# Patient Record
Sex: MALE | Race: WHITE | NOT HISPANIC OR LATINO | Employment: OTHER | URBAN - NONMETROPOLITAN AREA
[De-identification: names, ages, dates, MRNs, and addresses within clinical notes are randomized per-mention and may not be internally consistent; named-entity substitution may affect disease eponyms.]

---

## 2017-03-07 ENCOUNTER — TRANSCRIBE ORDERS (OUTPATIENT)
Dept: ULTRASOUND IMAGING | Facility: HOSPITAL | Age: 72
End: 2017-03-07

## 2017-03-07 DIAGNOSIS — I10 UNSPECIFIED ESSENTIAL HYPERTENSION: Primary | ICD-10-CM

## 2017-03-13 ENCOUNTER — HOSPITAL ENCOUNTER (OUTPATIENT)
Dept: ULTRASOUND IMAGING | Facility: HOSPITAL | Age: 72
Discharge: HOME | End: 2017-03-13
Admitting: INTERNAL MEDICINE

## 2017-03-13 DIAGNOSIS — I10 UNSPECIFIED ESSENTIAL HYPERTENSION: ICD-10-CM

## 2017-03-13 PROCEDURE — 76775 US EXAM ABDO BACK WALL LIM: CPT

## 2017-09-15 ENCOUNTER — OFFICE VISIT (OUTPATIENT)
Dept: UROLOGY | Facility: CLINIC | Age: 72
End: 2017-09-15

## 2017-09-15 VITALS
BODY MASS INDEX: 36.07 KG/M2 | SYSTOLIC BLOOD PRESSURE: 122 MMHG | HEIGHT: 68 IN | DIASTOLIC BLOOD PRESSURE: 80 MMHG | TEMPERATURE: 97.5 F | OXYGEN SATURATION: 97 % | WEIGHT: 238 LBS | HEART RATE: 68 BPM

## 2017-09-15 DIAGNOSIS — N13.8 BPH (BENIGN PROSTATIC HYPERTROPHY) WITH URINARY OBSTRUCTION: ICD-10-CM

## 2017-09-15 DIAGNOSIS — R97.20 ELEVATED PROSTATE SPECIFIC ANTIGEN (PSA): Primary | ICD-10-CM

## 2017-09-15 DIAGNOSIS — N40.1 BPH (BENIGN PROSTATIC HYPERTROPHY) WITH URINARY OBSTRUCTION: ICD-10-CM

## 2017-09-15 LAB
BILIRUB BLD-MCNC: NEGATIVE MG/DL
CLARITY, POC: CLEAR
COLOR UR: YELLOW
GLUCOSE UR STRIP-MCNC: NEGATIVE MG/DL
KETONES UR QL: NEGATIVE
LEUKOCYTE EST, POC: NEGATIVE
NITRITE UR-MCNC: NEGATIVE MG/ML
PH UR: 6 [PH] (ref 5–8)
PROT UR STRIP-MCNC: NEGATIVE MG/DL
PSA SERPL-MCNC: 1.77 NG/ML (ref 0.06–4)
RBC # UR STRIP: NEGATIVE /UL
SP GR UR: 1.01 (ref 1–1.03)
UROBILINOGEN UR QL: NORMAL

## 2017-09-15 PROCEDURE — 99213 OFFICE O/P EST LOW 20 MIN: CPT | Performed by: UROLOGY

## 2017-09-15 PROCEDURE — 81003 URINALYSIS AUTO W/O SCOPE: CPT | Performed by: UROLOGY

## 2017-09-15 RX ORDER — AMLODIPINE BESYLATE 5 MG/1
5 TABLET ORAL DAILY
COMMUNITY
End: 2019-09-23 | Stop reason: SDUPTHER

## 2017-09-15 RX ORDER — FINASTERIDE 5 MG/1
5 TABLET, FILM COATED ORAL DAILY
Qty: 90 TABLET | Refills: 3 | Status: SHIPPED | OUTPATIENT
Start: 2017-09-15 | End: 2019-07-17 | Stop reason: SDUPTHER

## 2017-09-15 RX ORDER — DOXAZOSIN 2 MG/1
2 TABLET ORAL NIGHTLY
COMMUNITY
End: 2019-07-17 | Stop reason: SDUPTHER

## 2018-09-17 ENCOUNTER — OFFICE VISIT (OUTPATIENT)
Dept: UROLOGY | Facility: CLINIC | Age: 73
End: 2018-09-17

## 2018-09-17 VITALS
HEIGHT: 68 IN | SYSTOLIC BLOOD PRESSURE: 126 MMHG | BODY MASS INDEX: 36.07 KG/M2 | HEART RATE: 64 BPM | OXYGEN SATURATION: 96 % | TEMPERATURE: 98.6 F | WEIGHT: 238 LBS | DIASTOLIC BLOOD PRESSURE: 82 MMHG

## 2018-09-17 DIAGNOSIS — Z87.438 HISTORY OF BPH: Primary | ICD-10-CM

## 2018-09-17 DIAGNOSIS — R97.20 ELEVATED PROSTATE SPECIFIC ANTIGEN (PSA): ICD-10-CM

## 2018-09-17 LAB
BILIRUB BLD-MCNC: NEGATIVE MG/DL
CLARITY, POC: CLEAR
COLOR UR: YELLOW
GLUCOSE UR STRIP-MCNC: NEGATIVE MG/DL
KETONES UR QL: NEGATIVE
LEUKOCYTE EST, POC: NEGATIVE
NITRITE UR-MCNC: NEGATIVE MG/ML
PH UR: 6 [PH] (ref 5–8)
PROT UR STRIP-MCNC: NEGATIVE MG/DL
RBC # UR STRIP: NEGATIVE /UL
SP GR UR: 1.02 (ref 1–1.03)
UROBILINOGEN UR QL: NORMAL

## 2018-09-17 PROCEDURE — 99213 OFFICE O/P EST LOW 20 MIN: CPT | Performed by: UROLOGY

## 2018-09-17 PROCEDURE — 81003 URINALYSIS AUTO W/O SCOPE: CPT | Performed by: UROLOGY

## 2018-09-17 NOTE — PROGRESS NOTES
Chief Complaint  Benign Prostatic Hypertrophy (Patient is here for a yearly follow up.)        NATHALY Leyva is a 73 y.o. male who returns today for annual checkup generally doing well.  He's known have an enlarged prostate and originally had an elevated PSA.  Since taking finasteride to shrink the enlarged gland his PSA has been back down to normal and was most recently measured by Dr. Mejias at 1.7.  He also takes Cardura for hypertension and this could assist his voiding.    Vitals:    09/17/18 1014   BP: 126/82   Pulse: 64   Temp: 98.6 °F (37 °C)   SpO2: 96%       Past Medical History  Past Medical History:   Diagnosis Date   • BPH (benign prostatic hyperplasia)    • ED (erectile dysfunction)    • Elevated PSA    • Hypogonadism in male        Past Surgical History  Past Surgical History:   Procedure Laterality Date   • EYE SURGERY         Medications  has a current medication list which includes the following prescription(s): allopurinol, amlodipine, doxazosin, duloxetine, finasteride, metoprolol succinate xl, and metoprolol tartrate.      Allergies  Allergies   Allergen Reactions   • Sulfa Antibiotics Unknown (See Comments)       Social History  Social History     Social History Narrative   • No narrative on file       Family History  He has no family history of bladder or kidney cancer  He has no family history of kidney stones      AUA Symptom Score:      Review of Systems  Review of Systems    Physical Exam  Physical Exam   Constitutional: He is oriented to person, place, and time. He appears well-developed and well-nourished.   HENT:   Head: Normocephalic and atraumatic.   Neck: Normal range of motion.   Pulmonary/Chest: Effort normal. No respiratory distress.   Abdominal: Soft. He exhibits no distension and no mass. There is no tenderness. No hernia.   Genitourinary: Rectum normal and prostate normal.   Musculoskeletal: Normal range of motion.   Lymphadenopathy:     He has no cervical adenopathy.    Neurological: He is alert and oriented to person, place, and time.   Skin: Skin is warm and dry.   Psychiatric: He has a normal mood and affect. His behavior is normal.   Vitals reviewed.      Labs Recent and today in the office:  Results for orders placed or performed in visit on 09/17/18   POC Urinalysis Dipstick, Automated   Result Value Ref Range    Color Yellow Yellow, Straw, Dark Yellow, María    Clarity, UA Clear Clear    Specific Gravity  1.020 1.005 - 1.030    pH, Urine 6.0 5.0 - 8.0    Leukocytes Negative Negative    Nitrite, UA Negative Negative    Protein, POC Negative Negative mg/dL    Glucose, UA Negative Negative, 1000 mg/dL (3+) mg/dL    Ketones, UA Negative Negative    Urobilinogen, UA Normal Normal    Bilirubin Negative Negative    Blood, UA Negative Negative         Assessment & Plan  BPH with outlet obstruction/elevated PSA: Digital rectal exam is benign and PSA is down to normal on Proscar.  He'll continue this medication and return on an annual basis.  He is encouraged to eat a heart healthy diet and reduce his weight to minimize the risk of prostate cancer.  Some dietary counseling is provided.

## 2019-07-17 ENCOUNTER — OFFICE VISIT (OUTPATIENT)
Dept: INTERNAL MEDICINE | Facility: CLINIC | Age: 74
End: 2019-07-17

## 2019-07-17 VITALS
DIASTOLIC BLOOD PRESSURE: 84 MMHG | HEART RATE: 66 BPM | SYSTOLIC BLOOD PRESSURE: 170 MMHG | TEMPERATURE: 97.3 F | BODY MASS INDEX: 36.34 KG/M2 | HEIGHT: 68 IN | WEIGHT: 239.8 LBS | OXYGEN SATURATION: 96 %

## 2019-07-17 DIAGNOSIS — E03.9 HYPOTHYROIDISM, UNSPECIFIED TYPE: ICD-10-CM

## 2019-07-17 DIAGNOSIS — I10 ESSENTIAL HYPERTENSION: ICD-10-CM

## 2019-07-17 DIAGNOSIS — M54.16 LUMBAR RADICULOPATHY: ICD-10-CM

## 2019-07-17 DIAGNOSIS — E11.9 TYPE 2 DIABETES MELLITUS WITHOUT COMPLICATION, WITHOUT LONG-TERM CURRENT USE OF INSULIN (HCC): ICD-10-CM

## 2019-07-17 DIAGNOSIS — E55.9 VITAMIN D DEFICIENCY: ICD-10-CM

## 2019-07-17 DIAGNOSIS — M10.00 IDIOPATHIC GOUT, UNSPECIFIED CHRONICITY, UNSPECIFIED SITE: ICD-10-CM

## 2019-07-17 DIAGNOSIS — M51.36 DEGENERATION OF LUMBAR INTERVERTEBRAL DISC: ICD-10-CM

## 2019-07-17 DIAGNOSIS — E78.5 HYPERLIPIDEMIA, UNSPECIFIED HYPERLIPIDEMIA TYPE: Primary | ICD-10-CM

## 2019-07-17 DIAGNOSIS — R25.2 LEG CRAMP: ICD-10-CM

## 2019-07-17 DIAGNOSIS — N40.0 BENIGN NON-NODULAR PROSTATIC HYPERPLASIA: ICD-10-CM

## 2019-07-17 PROCEDURE — 99204 OFFICE O/P NEW MOD 45 MIN: CPT | Performed by: INTERNAL MEDICINE

## 2019-07-17 RX ORDER — METOPROLOL SUCCINATE 100 MG/1
100 TABLET, EXTENDED RELEASE ORAL DAILY
Qty: 90 TABLET | Refills: 3 | Status: SHIPPED | OUTPATIENT
Start: 2019-07-17 | End: 2020-08-03

## 2019-07-17 RX ORDER — LISINOPRIL AND HYDROCHLOROTHIAZIDE 12.5; 1 MG/1; MG/1
1 TABLET ORAL DAILY
Qty: 30 TABLET | Refills: 1 | Status: SHIPPED | OUTPATIENT
Start: 2019-07-17 | End: 2019-09-11 | Stop reason: SDUPTHER

## 2019-07-17 RX ORDER — FINASTERIDE 5 MG/1
5 TABLET, FILM COATED ORAL DAILY
COMMUNITY
End: 2019-09-23 | Stop reason: SDUPTHER

## 2019-07-17 RX ORDER — DOXAZOSIN 2 MG/1
2 TABLET ORAL NIGHTLY
Qty: 90 TABLET | Refills: 3 | Status: SHIPPED | OUTPATIENT
Start: 2019-07-17 | End: 2020-08-11

## 2019-07-17 RX ORDER — DULOXETIN HYDROCHLORIDE 30 MG/1
30 CAPSULE, DELAYED RELEASE ORAL DAILY
Qty: 90 CAPSULE | Refills: 3 | Status: SHIPPED | OUTPATIENT
Start: 2019-07-17 | End: 2020-08-03

## 2019-07-17 NOTE — PROGRESS NOTES
Subjective   John Leyva is a 74 y.o. male.     Chief Complaint   Patient presents with   • Establish Care   • Leg Pain     right sided leg and hip pain, and numbness. Has to use a cane to walk on unlevel ground       History of Present Illness   Patient here to establish care.  Hyperlipidemia on diet need a blood tests.  Blood pressure elevated today on medications.  Hypothyroidism in the past need a blood tests.  Patient also complains right buttock area pain radiated to right leg weakness in the right leg and loss of balance foot drop.  History of abnormal MRI patient was recommended to have surgery but patient declined.  BPH is stable on medication needs medicine refill.  Gout is stable.  Also complains some leg cramps.  Diabetes on the problem list patient denies.    Current Outpatient Medications:   •  allopurinol (ZYLOPRIM) 100 MG tablet, , Disp: , Rfl:   •  amLODIPine (NORVASC) 5 MG tablet, Take 5 mg by mouth Daily., Disp: , Rfl:   •  doxazosin (CARDURA) 2 MG tablet, Take 2 mg by mouth Every Night., Disp: , Rfl:   •  DULoxetine (CYMBALTA) 30 MG capsule, , Disp: , Rfl:   •  finasteride (PROSCAR) 5 MG tablet, Take 5 mg by mouth Daily., Disp: , Rfl:   •  metoprolol succinate XL (TOPROL-XL) 100 MG 24 hr tablet, , Disp: , Rfl:     The following portions of the patient's history were reviewed and updated as appropriate: allergies, current medications, past family history, past medical history, past social history, past surgical history and problem list.    Review of Systems   Constitutional: Negative.    Respiratory: Negative.    Cardiovascular: Negative.    Gastrointestinal: Negative.    Musculoskeletal:        Radiculopathy   Skin: Negative.    Neurological: Positive for numbness.   Psychiatric/Behavioral: Negative.        Objective   Physical Exam   Constitutional: He is oriented to person, place, and time. He appears well-developed and well-nourished.   Neck: Neck supple.   Cardiovascular: Normal rate,  regular rhythm and normal heart sounds.   Pulmonary/Chest: Effort normal and breath sounds normal.   Abdominal: Bowel sounds are normal.   Neurological: He is alert and oriented to person, place, and time.   Skin: Skin is warm.   Psychiatric: He has a normal mood and affect.       All tests have been reviewed.    Assessment/Plan   Diagnoses and all orders for this visit:    Hyperlipidemia, unspecified hyperlipidemia type   -     Comprehensive Metabolic Panel  -     Lipid Panel    Essential hypertensionadd prinzide  -     CBC & Differential  -     Urinalysis With Microscopic If Indicated (No Culture) - Urine, Clean Catch    Vitamin D deficiency    Hypothyroidism, unspecified type   -     TSH    Degeneration of lumbar intervertebral disc     Benign non-nodular prostatic hyperplasia continue med refill  -     PSA DIAGNOSTIC    Idiopathic gout, unspecified chronicity, unspecified site  -     Uric Acid    Leg cramp stretch    Type 2 diabetes mellitus without complication, without long-term current use of insulin (CMS/[facility]), patient denies DM, but in hx  -     Hemoglobin A1c  -     MicroAlbumin, Urine, Random - Urine, Clean Catch    Lumbar radiculopathy loss of disc space in L4 and L5, need report, s/p epidural by pain clinic, candidate for surgery patient refused right foot drop now,     Other orders  -     finasteride (PROSCAR) 5 MG tablet; Take 5 mg by mouth Daily.    colon 3years ago  Need record  Patient states no DM, will check  3 week after labs

## 2019-08-21 ENCOUNTER — OFFICE VISIT (OUTPATIENT)
Dept: INTERNAL MEDICINE | Facility: CLINIC | Age: 74
End: 2019-08-21

## 2019-08-21 VITALS
HEART RATE: 61 BPM | WEIGHT: 238.4 LBS | HEIGHT: 68 IN | DIASTOLIC BLOOD PRESSURE: 80 MMHG | BODY MASS INDEX: 36.13 KG/M2 | TEMPERATURE: 96.7 F | OXYGEN SATURATION: 95 % | SYSTOLIC BLOOD PRESSURE: 134 MMHG

## 2019-08-21 DIAGNOSIS — E11.9 TYPE 2 DIABETES MELLITUS WITHOUT COMPLICATION, WITHOUT LONG-TERM CURRENT USE OF INSULIN (HCC): ICD-10-CM

## 2019-08-21 DIAGNOSIS — M10.00 IDIOPATHIC GOUT, UNSPECIFIED CHRONICITY, UNSPECIFIED SITE: ICD-10-CM

## 2019-08-21 DIAGNOSIS — M51.36 DEGENERATION OF LUMBAR INTERVERTEBRAL DISC: ICD-10-CM

## 2019-08-21 DIAGNOSIS — E78.5 HYPERLIPIDEMIA, UNSPECIFIED HYPERLIPIDEMIA TYPE: Primary | ICD-10-CM

## 2019-08-21 DIAGNOSIS — R53.83 OTHER FATIGUE: ICD-10-CM

## 2019-08-21 DIAGNOSIS — I10 ESSENTIAL HYPERTENSION: ICD-10-CM

## 2019-08-21 DIAGNOSIS — E55.9 VITAMIN D DEFICIENCY: ICD-10-CM

## 2019-08-21 DIAGNOSIS — N40.0 BENIGN NON-NODULAR PROSTATIC HYPERPLASIA: ICD-10-CM

## 2019-08-21 DIAGNOSIS — E03.9 HYPOTHYROIDISM, UNSPECIFIED TYPE: ICD-10-CM

## 2019-08-21 PROCEDURE — 96160 PT-FOCUSED HLTH RISK ASSMT: CPT | Performed by: INTERNAL MEDICINE

## 2019-08-21 PROCEDURE — G0439 PPPS, SUBSEQ VISIT: HCPCS | Performed by: INTERNAL MEDICINE

## 2019-08-21 PROCEDURE — 99213 OFFICE O/P EST LOW 20 MIN: CPT | Performed by: INTERNAL MEDICINE

## 2019-08-21 PROCEDURE — 99397 PER PM REEVAL EST PAT 65+ YR: CPT | Performed by: INTERNAL MEDICINE

## 2019-08-21 RX ORDER — ATORVASTATIN CALCIUM 20 MG/1
20 TABLET, FILM COATED ORAL DAILY
Qty: 30 TABLET | Refills: 1 | Status: SHIPPED | OUTPATIENT
Start: 2019-08-21 | End: 2019-10-02 | Stop reason: SDUPTHER

## 2019-08-21 RX ORDER — MELOXICAM 7.5 MG/1
7.5 TABLET ORAL 2 TIMES DAILY PRN
Qty: 60 TABLET | Refills: 1 | Status: SHIPPED | OUTPATIENT
Start: 2019-08-21 | End: 2019-10-02 | Stop reason: SDUPTHER

## 2019-08-21 NOTE — PROGRESS NOTES
Subsequent Medicare Wellness Visit    Chief Complaint   Patient presents with   • Medicare Wellness-subsequent       Subjective   History of Present Illness:  John Leyva is a 74 y.o. male who presents for a Subsequent Medicare Wellness Visit.    Patient here for wellness check also has some medical problems to be addressed.  Patient complains of lower back problems patient does have a history of a radiculopathy patient still has tingling numbness pain in the right buttock and right thigh all the time.  Weightbearing make it worse.  History of MRI possible surgical candidate patient declined.  Blood pressure normalized after medication.  Patient is not on cholesterol medication.  Patient yet to do blood tests for diabetes.  Patient also has significant BPH nighttime urine 2-4 times a night    HEALTH RISK ASSESSMENT    Recent Hospitalizations:  No hospitalization(s) within the last year.    Current Medical Providers:  Patient Care Team:  Mahesh Almanza MD as PCP - General (Internal Medicine)    Smoking Status:  Social History     Tobacco Use   Smoking Status Never Smoker   Smokeless Tobacco Never Used       Alcohol Consumption:  Social History     Substance and Sexual Activity   Alcohol Use No       Depression Screen:   PHQ-2/PHQ-9 Depression Screening 8/21/2019   Little interest or pleasure in doing things 0   Feeling down, depressed, or hopeless 0   Total Score 0       Fall Risk Screen:  STEADI Fall Risk Assessment was completed, and patient is at HIGH risk for falls. Assessment completed on:7/17/2019    Health Habits and Functional and Cognitive Screening:  Functional & Cognitive Status 8/21/2019   Do you have difficulty preparing food and eating? No   Do you have difficulty bathing yourself, getting dressed or grooming yourself? No   Do you have difficulty using the toilet? No   Do you have difficulty moving around from place to place? No   Do you have trouble with steps or getting out of a bed or a chair? No    Current Diet Well Balanced Diet   Dental Exam Not up to date   Eye Exam Up to date   Exercise (times per week) 7 times per week   Current Exercise Activities Include Yard Work   Do you need help using the phone?  No   Are you deaf or do you have serious difficulty hearing?  No   Do you need help with transportation? No   Do you need help shopping? No   Do you need help preparing meals?  No   Do you need help with housework?  No   Do you need help with laundry? No   Do you need help taking your medications? No   Do you need help managing money? No   Do you ever drive or ride in a car without wearing a seat belt? No   Have you felt unusual stress, anger or loneliness in the last month? No   Who do you live with? Spouse   If you need help, do you have trouble finding someone available to you? No   Have you been bothered in the last four weeks by sexual problems? No   Do you have difficulty concentrating, remembering or making decisions? No         Does the patient have evidence of cognitive impairment? No    Asprin use counseling:Does not need ASA (and currently is not on it)    Age-appropriate Screening Schedule:  Refer to the list below for future screening recommendations based on patient's age, sex and/or medical conditions. Orders for these recommended tests are listed in the plan section. The patient has been provided with a written plan.    Health Maintenance   Topic Date Due   • URINE MICROALBUMIN  1945   • ZOSTER VACCINE (1 of 2) 07/07/1997   • DIABETIC FOOT EXAM  09/15/2017   • HEMOGLOBIN A1C  09/15/2017   • DIABETIC EYE EXAM  09/15/2017   • COLONOSCOPY  09/15/2017   • LIPID PANEL  07/17/2019   • INFLUENZA VACCINE  08/01/2019   • TDAP/TD VACCINES (2 - Td) 05/25/2027   • PNEUMOCOCCAL VACCINES (65+ LOW/MEDIUM RISK)  Completed          The following portions of the patient's history were reviewed and updated as appropriate: allergies, current medications, past family history, past medical history, past  social history, past surgical history and problem list.    Outpatient Medications Prior to Visit   Medication Sig Dispense Refill   • allopurinol (ZYLOPRIM) 100 MG tablet      • amLODIPine (NORVASC) 5 MG tablet Take 5 mg by mouth Daily.     • doxazosin (CARDURA) 2 MG tablet Take 1 tablet by mouth Every Night. 90 tablet 3   • DULoxetine (CYMBALTA) 30 MG capsule Take 1 capsule by mouth Daily. 90 capsule 3   • finasteride (PROSCAR) 5 MG tablet Take 5 mg by mouth Daily.     • lisinopril-hydrochlorothiazide (ZESTORETIC) 10-12.5 MG per tablet Take 1 tablet by mouth Daily. 30 tablet 1   • metoprolol succinate XL (TOPROL-XL) 100 MG 24 hr tablet Take 1 tablet by mouth Daily. 90 tablet 3     No facility-administered medications prior to visit.        Patient Active Problem List   Diagnosis   • Benign non-nodular prostatic hyperplasia   • Vitamin D deficiency   • Type 2 diabetes mellitus without complication (CMS/[facility])   • Primary gout   • Hypothyroidism   • Hyperlipidemia   • Essential hypertension   • Degeneration of lumbar intervertebral disc       Advanced Care Planning:  Patient has an advance directive - a copy has not been provided. Have asked the patient to send this to us to add to record    Review of Systems   Constitutional: Negative.    HENT: Negative.    Eyes: Negative.    Respiratory: Negative.    Cardiovascular: Negative.    Gastrointestinal: Negative.    Endocrine: Negative.    Genitourinary: Positive for difficulty urinating.   Musculoskeletal: Negative.    Skin: Negative.    Allergic/Immunologic: Negative.    Neurological: Negative.    Hematological: Negative.    Psychiatric/Behavioral: Negative.    All other systems reviewed and are negative.      Compared to one year ago, the patient feels his physical health is the same.  Compared to one year ago, the patient feels his mental health is the same.    Reviewed chart for potential of high risk medication in the elderly: yes  Reviewed chart for potential of  "harmful drug interactions in the elderly:no    Objective         Vitals:    08/21/19 1017   BP: 134/80   Pulse: 61   Temp: 96.7 °F (35.9 °C)   TempSrc: Temporal   SpO2: 95%   Weight: 108 kg (238 lb 6.4 oz)   Height: 172.7 cm (68\")   PainSc: 0-No pain       Body mass index is 36.25 kg/m².  Discussed the patient's BMI with him. The BMI is above average; BMI management plan is completed.    Physical Exam   Constitutional: He is oriented to person, place, and time. He appears well-developed and well-nourished.   HENT:   Head: Normocephalic and atraumatic.   Right Ear: External ear normal.   Left Ear: External ear normal.   Nose: Nose normal.   Mouth/Throat: Oropharynx is clear and moist.   Eyes: Conjunctivae and EOM are normal. Pupils are equal, round, and reactive to light.   Neck: Normal range of motion. Neck supple. No thyromegaly present.   Cardiovascular: Normal rate, regular rhythm, normal heart sounds and intact distal pulses.   Pulmonary/Chest: Effort normal and breath sounds normal.   Abdominal: Soft. Bowel sounds are normal.   Genitourinary: Rectum normal and penis normal.   Genitourinary Comments: BPH 4+   Musculoskeletal: Normal range of motion.   Neurological: He is alert and oriented to person, place, and time. He has normal reflexes.   Skin: Skin is warm and dry.   Psychiatric: He has a normal mood and affect. His behavior is normal. Judgment and thought content normal.   Nursing note and vitals reviewed.            Assessment/Plan   Medicare Risks and Personalized Health Plan  CMS Preventative Services Quick Reference  Obesity/Overweight     The above risks/problems have been discussed with the patient.  Pertinent information has been shared with the patient in the After Visit Summary.  Follow up plans and orders are seen below in the Assessment/Plan Section.    Diagnoses and all orders for this visit:    1. Hyperlipidemia, unspecified hyperlipidemia type (Primary)  Start lipitor 20    2. Essential " hypertension cont med    3. Vitamin D deficiency do lab    4. Type 2 diabetes mellitus without complication, without long-term current use of insulin (CMS/[facility]) do lab start lipitor    5. Hypothyroidism, unspecified type do lab    6. Degeneration of lumbar intervertebral disc cont med radiculpathy pain in right gluteal and thigh. MRI , start mobic.  Need old records      7. Benign non-nodular prostatic hyperplasia 4+ cont med patient is seeing urologist yearly    8. Idiopathic gout, unspecified chronicity, unspecified site do lab    colonoscopy 3 years ago need to report  Follow Up:  No Follow-up on file.     An After Visit Summary and PPPS were given to the patient.     6 weeks after blood tests

## 2019-09-04 ENCOUNTER — HOSPITAL ENCOUNTER (OUTPATIENT)
Dept: MRI IMAGING | Facility: HOSPITAL | Age: 74
End: 2019-09-04

## 2019-09-11 ENCOUNTER — HOSPITAL ENCOUNTER (OUTPATIENT)
Dept: MRI IMAGING | Facility: HOSPITAL | Age: 74
Discharge: HOME | End: 2019-09-11
Admitting: INTERNAL MEDICINE

## 2019-09-11 PROCEDURE — 72148 MRI LUMBAR SPINE W/O DYE: CPT

## 2019-09-12 RX ORDER — LISINOPRIL AND HYDROCHLOROTHIAZIDE 12.5; 1 MG/1; MG/1
TABLET ORAL
Qty: 30 TABLET | Refills: 0 | Status: SHIPPED | OUTPATIENT
Start: 2019-09-12 | End: 2019-10-02 | Stop reason: SDUPTHER

## 2019-09-23 ENCOUNTER — OFFICE VISIT (OUTPATIENT)
Dept: UROLOGY | Facility: CLINIC | Age: 74
End: 2019-09-23

## 2019-09-23 VITALS
SYSTOLIC BLOOD PRESSURE: 128 MMHG | HEART RATE: 63 BPM | BODY MASS INDEX: 36.07 KG/M2 | WEIGHT: 238 LBS | HEIGHT: 68 IN | DIASTOLIC BLOOD PRESSURE: 82 MMHG | OXYGEN SATURATION: 98 %

## 2019-09-23 DIAGNOSIS — N40.0 BENIGN NON-NODULAR PROSTATIC HYPERPLASIA: ICD-10-CM

## 2019-09-23 DIAGNOSIS — R97.20 ELEVATED PROSTATE SPECIFIC ANTIGEN (PSA): Primary | ICD-10-CM

## 2019-09-23 LAB
25(OH)D3+25(OH)D2 SERPL-MCNC: 34.5 NG/ML (ref 30–100)
BILIRUB BLD-MCNC: NEGATIVE MG/DL
CLARITY, POC: CLEAR
COLOR UR: YELLOW
GLUCOSE UR STRIP-MCNC: NEGATIVE MG/DL
KETONES UR QL: NEGATIVE
LEUKOCYTE EST, POC: NEGATIVE
NITRITE UR-MCNC: NEGATIVE MG/ML
PH UR: 7.5 [PH] (ref 5–8)
PROT UR STRIP-MCNC: NEGATIVE MG/DL
RBC # UR STRIP: NEGATIVE /UL
SP GR UR: 1.01 (ref 1–1.03)
UROBILINOGEN UR QL: NORMAL

## 2019-09-23 PROCEDURE — 99213 OFFICE O/P EST LOW 20 MIN: CPT | Performed by: UROLOGY

## 2019-09-23 PROCEDURE — 81003 URINALYSIS AUTO W/O SCOPE: CPT | Performed by: UROLOGY

## 2019-09-23 RX ORDER — FINASTERIDE 5 MG/1
5 TABLET, FILM COATED ORAL DAILY
Qty: 90 TABLET | Refills: 3 | Status: SHIPPED | OUTPATIENT
Start: 2019-09-23 | End: 2020-09-14

## 2019-09-23 NOTE — PROGRESS NOTES
Chief Complaint  Elevated PSA (yearly follow up.)        NATHALY Leyva is a 74 y.o. male who returns today for an annual checkup originally referred for his enlarged prostate and an elevated PSA.  Since taking finasteride to shrink the gland his symptoms have improved and his PSA is down to normal.  Is been more than a year but it was last measured at 1.7.  He takes Cardura for hypertension which may also improve his voiding but is not a problem at this point describing an AUA index of only 9.    Vitals:    09/23/19 1022   BP: 128/82   Pulse: 63   SpO2: 98%       Past Medical History  Past Medical History:   Diagnosis Date   • BPH (benign prostatic hyperplasia)    • Diabetes mellitus (CMS/HCC)    • ED (erectile dysfunction)    • Elevated PSA    • Gout    • Hypertension    • Hypogonadism in male    • Hypothyroidism        Past Surgical History  Past Surgical History:   Procedure Laterality Date   • EYE SURGERY         Medications  has a current medication list which includes the following prescription(s): allopurinol, amlodipine, atorvastatin, doxazosin, duloxetine, finasteride, lisinopril-hydrochlorothiazide, meloxicam, and metoprolol succinate xl.      Allergies  No Known Allergies    Social History  Social History     Socioeconomic History   • Marital status:      Spouse name: Not on file   • Number of children: Not on file   • Years of education: Not on file   • Highest education level: Not on file   Occupational History     Employer: RETIRED   Tobacco Use   • Smoking status: Never Smoker   • Smokeless tobacco: Never Used   Substance and Sexual Activity   • Alcohol use: No   • Drug use: No   • Sexual activity: Defer       Family History  He has no family history of bladder or kidney cancer  He has no family history of kidney stones      AUA Symptom Score:      Review of Systems  Review of Systems   Constitutional: Negative for activity change, appetite change, chills, fatigue, fever, unexpected weight gain and  unexpected weight loss.   Respiratory: Negative for apnea, cough, chest tightness, shortness of breath, wheezing and stridor.    Cardiovascular: Negative for chest pain, palpitations and leg swelling.   Gastrointestinal: Negative for abdominal distention, abdominal pain, anal bleeding, blood in stool, constipation, diarrhea, nausea, rectal pain, vomiting, GERD and indigestion.   Genitourinary: Positive for difficulty urinating. Negative for decreased libido, decreased urine volume, discharge, dysuria, flank pain, frequency, genital sores, hematuria, nocturia, penile pain, erectile dysfunction, penile swelling, scrotal swelling, testicular pain, urgency and urinary incontinence.   Musculoskeletal: Negative for back pain and joint swelling.   Neurological: Negative for tremors, seizures, speech difficulty, weakness and numbness.   Psychiatric/Behavioral: Negative for agitation, decreased concentration, sleep disturbance, depressed mood and stress. The patient is not nervous/anxious.        Physical Exam  Physical Exam   Constitutional: He is oriented to person, place, and time. He appears well-developed and well-nourished.   HENT:   Head: Normocephalic and atraumatic.   Neck: Normal range of motion.   Pulmonary/Chest: Effort normal. No respiratory distress.   Abdominal: Soft. He exhibits no distension and no mass. There is no tenderness. No hernia.   Genitourinary: Rectum normal.         Musculoskeletal: Normal range of motion.   Lymphadenopathy:     He has no cervical adenopathy.   Neurological: He is alert and oriented to person, place, and time.   Skin: Skin is warm and dry.   Psychiatric: He has a normal mood and affect. His behavior is normal.   Vitals reviewed.      Labs Recent and today in the office:  Results for orders placed or performed in visit on 09/23/19   POC Urinalysis Dipstick, Automated   Result Value Ref Range    Color Yellow Yellow, Straw, Dark Yellow, María    Clarity, UA Clear Clear    Specific  Gravity  1.010 1.005 - 1.030    pH, Urine 7.5 5.0 - 8.0    Leukocytes Negative Negative    Nitrite, UA Negative Negative    Protein, POC Negative Negative mg/dL    Glucose, UA Negative Negative, 1000 mg/dL (3+) mg/dL    Ketones, UA Negative Negative    Urobilinogen, UA Normal Normal    Bilirubin Negative Negative    Blood, UA Negative Negative         Assessment & Plan  BPH with outlet obstruction: Voiding great on Cardura and Proscar.  Digital rectal exam today is benign.  He declines PSA stating he is getting lab work with Dr. Almanza tomorrow.  He can return on an annual basis.

## 2019-09-24 ENCOUNTER — TELEPHONE (OUTPATIENT)
Dept: INTERNAL MEDICINE | Facility: CLINIC | Age: 74
End: 2019-09-24

## 2019-09-24 LAB
ALBUMIN SERPL-MCNC: 4.4 G/DL (ref 3.5–5.2)
ALBUMIN/GLOB SERPL: 1.4 G/DL
ALP SERPL-CCNC: 108 U/L (ref 39–117)
ALT SERPL-CCNC: 19 U/L (ref 1–41)
APPEARANCE UR: CLEAR
AST SERPL-CCNC: 22 U/L (ref 1–40)
BASOPHILS # BLD AUTO: 0.04 10*3/MM3 (ref 0–0.2)
BASOPHILS NFR BLD AUTO: 0.4 % (ref 0–1.5)
BILIRUB SERPL-MCNC: 1.2 MG/DL (ref 0.2–1.2)
BILIRUB UR QL STRIP: NEGATIVE
BUN SERPL-MCNC: 16 MG/DL (ref 8–23)
BUN/CREAT SERPL: 13.3 (ref 7–25)
CALCIUM SERPL-MCNC: 9.7 MG/DL (ref 8.6–10.5)
CHLORIDE SERPL-SCNC: 98 MMOL/L (ref 98–107)
CHOLEST SERPL-MCNC: 135 MG/DL (ref 0–200)
CO2 SERPL-SCNC: 25.1 MMOL/L (ref 22–29)
COLOR UR: ABNORMAL
CREAT SERPL-MCNC: 1.2 MG/DL (ref 0.76–1.27)
EOSINOPHIL # BLD AUTO: 0.22 10*3/MM3 (ref 0–0.4)
EOSINOPHIL NFR BLD AUTO: 2.4 % (ref 0.3–6.2)
ERYTHROCYTE [DISTWIDTH] IN BLOOD BY AUTOMATED COUNT: 13.7 % (ref 12.3–15.4)
GLOBULIN SER CALC-MCNC: 3.1 GM/DL
GLUCOSE SERPL-MCNC: 112 MG/DL (ref 65–99)
GLUCOSE UR QL: NEGATIVE
HBA1C MFR BLD: 6.5 % (ref 4.8–5.6)
HCT VFR BLD AUTO: 46.3 % (ref 37.5–51)
HCV AB S/CO SERPL IA: 0.1 S/CO RATIO (ref 0–0.9)
HDLC SERPL-MCNC: 37 MG/DL (ref 40–60)
HGB BLD-MCNC: 15.3 G/DL (ref 13–17.7)
HGB UR QL STRIP: NEGATIVE
IMM GRANULOCYTES # BLD AUTO: 0.02 10*3/MM3 (ref 0–0.05)
IMM GRANULOCYTES NFR BLD AUTO: 0.2 % (ref 0–0.5)
KETONES UR QL STRIP: NEGATIVE
LDLC SERPL CALC-MCNC: 69 MG/DL (ref 0–100)
LEUKOCYTE ESTERASE UR QL STRIP: NEGATIVE
LYMPHOCYTES # BLD AUTO: 2.6 10*3/MM3 (ref 0.7–3.1)
LYMPHOCYTES NFR BLD AUTO: 28.5 % (ref 19.6–45.3)
MCH RBC QN AUTO: 30 PG (ref 26.6–33)
MCHC RBC AUTO-ENTMCNC: 33 G/DL (ref 31.5–35.7)
MCV RBC AUTO: 90.8 FL (ref 79–97)
MICROALBUMIN UR-MCNC: 24.9 UG/ML
MONOCYTES # BLD AUTO: 0.57 10*3/MM3 (ref 0.1–0.9)
MONOCYTES NFR BLD AUTO: 6.2 % (ref 5–12)
NEUTROPHILS # BLD AUTO: 5.68 10*3/MM3 (ref 1.7–7)
NEUTROPHILS NFR BLD AUTO: 62.3 % (ref 42.7–76)
NITRITE UR QL STRIP: NEGATIVE
NRBC BLD AUTO-RTO: 0 /100 WBC (ref 0–0.2)
PH UR STRIP: 7.5 [PH] (ref 5–8)
PLATELET # BLD AUTO: 286 10*3/MM3 (ref 140–450)
POTASSIUM SERPL-SCNC: 5.1 MMOL/L (ref 3.5–5.2)
PROT SERPL-MCNC: 7.5 G/DL (ref 6–8.5)
PROT UR QL STRIP: ABNORMAL
PSA SERPL-MCNC: 2.24 NG/ML (ref 0–4)
RBC # BLD AUTO: 5.1 10*6/MM3 (ref 4.14–5.8)
SODIUM SERPL-SCNC: 137 MMOL/L (ref 136–145)
SP GR UR: 1.02 (ref 1–1.03)
TRIGL SERPL-MCNC: 147 MG/DL (ref 0–150)
TSH SERPL DL<=0.005 MIU/L-ACNC: 1.9 UIU/ML (ref 0.27–4.2)
URATE SERPL-MCNC: 7.4 MG/DL (ref 3.4–7)
UROBILINOGEN UR STRIP-MCNC: ABNORMAL MG/DL
VLDLC SERPL CALC-MCNC: 29.4 MG/DL
WBC # BLD AUTO: 9.13 10*3/MM3 (ref 3.4–10.8)

## 2019-09-24 RX ORDER — AMLODIPINE BESYLATE 5 MG/1
5 TABLET ORAL DAILY
Qty: 30 TABLET | Refills: 3 | Status: SHIPPED | OUTPATIENT
Start: 2019-09-24 | End: 2019-10-02 | Stop reason: SDUPTHER

## 2019-10-02 ENCOUNTER — OFFICE VISIT (OUTPATIENT)
Dept: INTERNAL MEDICINE | Facility: CLINIC | Age: 74
End: 2019-10-02

## 2019-10-02 VITALS
SYSTOLIC BLOOD PRESSURE: 128 MMHG | HEIGHT: 68 IN | RESPIRATION RATE: 18 BRPM | DIASTOLIC BLOOD PRESSURE: 80 MMHG | WEIGHT: 240.4 LBS | HEART RATE: 63 BPM | BODY MASS INDEX: 36.43 KG/M2 | TEMPERATURE: 97.5 F | OXYGEN SATURATION: 96 %

## 2019-10-02 DIAGNOSIS — I10 ESSENTIAL HYPERTENSION: ICD-10-CM

## 2019-10-02 DIAGNOSIS — E78.5 HYPERLIPIDEMIA, UNSPECIFIED HYPERLIPIDEMIA TYPE: Primary | ICD-10-CM

## 2019-10-02 DIAGNOSIS — E55.9 VITAMIN D DEFICIENCY: ICD-10-CM

## 2019-10-02 DIAGNOSIS — R53.83 OTHER FATIGUE: ICD-10-CM

## 2019-10-02 DIAGNOSIS — E03.9 HYPOTHYROIDISM, UNSPECIFIED TYPE: ICD-10-CM

## 2019-10-02 DIAGNOSIS — M51.36 DEGENERATION OF LUMBAR INTERVERTEBRAL DISC: ICD-10-CM

## 2019-10-02 DIAGNOSIS — K46.9 ABDOMINAL HERNIA WITHOUT OBSTRUCTION AND WITHOUT GANGRENE, RECURRENCE NOT SPECIFIED, UNSPECIFIED HERNIA TYPE: ICD-10-CM

## 2019-10-02 DIAGNOSIS — M10.00 IDIOPATHIC GOUT, UNSPECIFIED CHRONICITY, UNSPECIFIED SITE: ICD-10-CM

## 2019-10-02 DIAGNOSIS — M54.16 LUMBAR RADICULOPATHY: ICD-10-CM

## 2019-10-02 DIAGNOSIS — E11.9 TYPE 2 DIABETES MELLITUS WITHOUT COMPLICATION, WITHOUT LONG-TERM CURRENT USE OF INSULIN (HCC): ICD-10-CM

## 2019-10-02 DIAGNOSIS — N40.0 BENIGN NON-NODULAR PROSTATIC HYPERPLASIA: ICD-10-CM

## 2019-10-02 PROCEDURE — 99214 OFFICE O/P EST MOD 30 MIN: CPT | Performed by: INTERNAL MEDICINE

## 2019-10-02 RX ORDER — MELOXICAM 7.5 MG/1
7.5 TABLET ORAL 2 TIMES DAILY PRN
Qty: 60 TABLET | Refills: 1 | Status: SHIPPED | OUTPATIENT
Start: 2019-10-02 | End: 2019-12-23 | Stop reason: SDUPTHER

## 2019-10-02 RX ORDER — LISINOPRIL AND HYDROCHLOROTHIAZIDE 12.5; 1 MG/1; MG/1
1 TABLET ORAL DAILY
Qty: 30 TABLET | Refills: 0 | Status: SHIPPED | OUTPATIENT
Start: 2019-10-02 | End: 2019-11-15 | Stop reason: SDUPTHER

## 2019-10-02 RX ORDER — AMLODIPINE BESYLATE 5 MG/1
5 TABLET ORAL DAILY
Qty: 30 TABLET | Refills: 3 | Status: SHIPPED | OUTPATIENT
Start: 2019-10-02 | End: 2019-12-19 | Stop reason: SDUPTHER

## 2019-10-02 RX ORDER — ATORVASTATIN CALCIUM 20 MG/1
20 TABLET, FILM COATED ORAL DAILY
Qty: 30 TABLET | Refills: 1 | Status: SHIPPED | OUTPATIENT
Start: 2019-10-02 | End: 2020-02-25 | Stop reason: SDUPTHER

## 2019-10-02 NOTE — PROGRESS NOTES
Subjective   John Leyva is a 74 y.o. male.     Chief Complaint   Patient presents with   • Follow-up     6 wk f/u for blood work       History of Present Illness   Hyperlipidemia- stable on medications  Essential HTN- stable on medications  T2DM w/o complications- A1c slighty elevated, continue to monitor  Vit D deficiency- stable on medications  Hypothyroidism- TSH levels within normal limits, continue to monitor  Lumbar intervertebral disc degeneration- received MRI. Pt states he is still having pain radiate down both legs and pain is intermittent. He states the meloxicam is giving him some relief. Pt states he can only walk for about 5-10min at a time and then he must sit down to give him some relief and then he can resume walking.   Benign BPH- pt states he is not exhibiting any urinary sx and had his yearly checkup a couple weeks ago with Dr. Glaser. PSA levels within normal limits.   Gout- uric acid levels elevated. Continue medication and monitor  Fatigue- pt states he is still experiencing some fatigue, stating he feels more lethargic than anything. He states he is still able to perform daily activities but has decreased energy throughout the day.   Hernia- pt states he has had this hernia for years and has seen Dr. Sutherland for it who stated he does not have to have it repaired. He states it does get sore every once and a while but does not stop him from performing daily activities.     Current Outpatient Medications:   •  allopurinol (ZYLOPRIM) 100 MG tablet, , Disp: , Rfl:   •  amLODIPine (NORVASC) 5 MG tablet, Take 1 tablet by mouth Daily., Disp: 30 tablet, Rfl: 3  •  atorvastatin (LIPITOR) 20 MG tablet, Take 1 tablet by mouth Daily., Disp: 30 tablet, Rfl: 1  •  doxazosin (CARDURA) 2 MG tablet, Take 1 tablet by mouth Every Night., Disp: 90 tablet, Rfl: 3  •  DULoxetine (CYMBALTA) 30 MG capsule, Take 1 capsule by mouth Daily., Disp: 90 capsule, Rfl: 3  •  finasteride (PROSCAR) 5 MG tablet, Take 1 tablet by  mouth Daily., Disp: 90 tablet, Rfl: 3  •  lisinopril-hydrochlorothiazide (PRINZIDE,ZESTORETIC) 10-12.5 MG per tablet, Take 1 tablet by mouth Daily., Disp: 30 tablet, Rfl: 0  •  meloxicam (MOBIC) 7.5 MG tablet, Take 1 tablet by mouth 2 (Two) Times a Day As Needed (lumbar radiculopathy)., Disp: 60 tablet, Rfl: 1  •  metoprolol succinate XL (TOPROL-XL) 100 MG 24 hr tablet, Take 1 tablet by mouth Daily., Disp: 90 tablet, Rfl: 3    The following portions of the patient's history were reviewed and updated as appropriate: allergies, current medications, past family history, past medical history, past social history, past surgical history and problem list.    Review of Systems   Constitutional: Positive for fatigue (pt states he is more lethargic than anything). Negative for activity change, chills and fever.   HENT: Negative.  Negative for congestion and sinus pressure.    Eyes: Negative.    Respiratory: Negative.    Cardiovascular: Negative.    Gastrointestinal: Negative.    Endocrine: Negative.    Genitourinary: Negative.    Musculoskeletal: Negative for back pain.   Skin: Negative.    Neurological: Negative.    Psychiatric/Behavioral: Negative.        Objective   Physical Exam   Constitutional: He is oriented to person, place, and time. He appears well-developed and well-nourished.   Neck: Neck supple.   Cardiovascular: Normal rate, regular rhythm and normal heart sounds.   Pulmonary/Chest: Effort normal and breath sounds normal.   Abdominal: Bowel sounds are normal. A hernia is present.   Neurological: He is alert and oriented to person, place, and time.   Skin: Skin is warm.   Psychiatric: He has a normal mood and affect.       All tests have been reviewed.    Assessment/Plan   Diagnoses and all orders for this visit:    Hyperlipidemia, unspecified hyperlipidemia type   -stable, continue medication    Essential hypertension   -stable, continue medication    Type 2 diabetes mellitus without complication, without  long-term current use of insulin (CMS/[facility])   -A1c slightly elevated, pt not taking any medication    Vitamin D deficiency   -stable, continue medication    Hypothyroidism, unspecified type   -TSH level w/in normal limits, continue to monitor. Pt states he is not taking any medications    Degeneration of lumbar intervertebral disc   -received MRI. Continue medication. Pt states he would not like to have surgery despite being told he is a candidate for surgery. , ref to [facility]     Benign non-nodular prostatic hyperplasia follow up with uro   -PSA w/in normal limits, continue to monitor and continue medication    Idiopathic gout, unspecified chronicity, unspecified site   -uric acid levels elevated, continue medication    Fatigue resolved   -pt states he is sleeping well at night. Continue to monitor    Abdominal hernia   -continue to monitor and see Dr. Sutherland       Still in need of colonoscopy report done 3 years from [facility],need repeat.  pt states the report says it was clear    Flu vaccine- denied                      6 mo

## 2019-10-29 ENCOUNTER — OFFICE VISIT (OUTPATIENT)
Dept: NEUROSURGERY | Facility: CLINIC | Age: 74
End: 2019-10-29

## 2019-10-29 VITALS — BODY MASS INDEX: 36.37 KG/M2 | HEIGHT: 68 IN | WEIGHT: 240 LBS

## 2019-10-29 DIAGNOSIS — M43.16 SPONDYLOLISTHESIS OF LUMBAR REGION: ICD-10-CM

## 2019-10-29 DIAGNOSIS — M51.36 BULGING LUMBAR DISC: ICD-10-CM

## 2019-10-29 DIAGNOSIS — M48.062 SPINAL STENOSIS OF LUMBAR REGION WITH NEUROGENIC CLAUDICATION: ICD-10-CM

## 2019-10-29 DIAGNOSIS — M51.26 HERNIATION OF INTERVERTEBRAL DISC OF LUMBAR SPINE: Primary | ICD-10-CM

## 2019-10-29 PROCEDURE — 99203 OFFICE O/P NEW LOW 30 MIN: CPT | Performed by: NEUROLOGICAL SURGERY

## 2019-10-29 NOTE — PROGRESS NOTES
Subjective   Patient ID: John Leyva is a 74 y.o. male is being seen for consultation today at the request of No ref. provider found  Chief Complaint: Back and leg pain    History of Present Illness: Mr. Leyva is a 74-year-old retired gentleman from [place] with a history of pain in his back that radiates to his right hip and leg with a progressive weakness in his right lower leg over the past 5 months.  For 4 to 5 years he had pain in his left hip and leg which was manageable, but the pain has shifted rather abruptly to the right side now it affects and whether he sitting or standing, but has not only seem to weak in his right leg and cause muscle shrinkage in the calf, but has limited his ability to walk distances severely.  A couple of years ago he had injections in his back that did not give him any real relief.    Review of Radiographic Studies:  Lumbar MRI scan dated 9/11/2019 shows a severe stenosis at L4-5 with a minimal grade 1 spondylolisthesis.  There is also a chronic disc bulge at L5-S1 which appears to have a chronic left-sided herniation which I think clinically explains his left-sided leg pain over the past few years.  There is also a disc bulge at L5-S1 on the right with a relatively minor lateral recess stenosis which I believe to be asymptomatic, but cannot clearly determine from the MRI appearance.    The following portions of the patient's history were reviewed, updated as appropriate and approved: allergies, current medications, past family history, past medical history, past social history, past surgical history, review of systems and problem list.    Review of Systems   Constitutional: Negative for activity change, appetite change, chills, diaphoresis, fatigue, fever and unexpected weight change.   HENT: Negative for congestion, dental problem, drooling, ear discharge, ear pain, facial swelling, hearing loss, mouth sores, nosebleeds, postnasal drip, rhinorrhea, sinus pressure, sneezing, sore  throat, tinnitus, trouble swallowing and voice change.    Eyes: Negative for photophobia, pain, discharge, redness, itching and visual disturbance.   Respiratory: Negative for apnea, cough, choking, chest tightness, shortness of breath, wheezing and stridor.    Cardiovascular: Negative for chest pain, palpitations and leg swelling.   Gastrointestinal: Negative for abdominal distention, abdominal pain, anal bleeding, blood in stool, constipation, diarrhea, nausea, rectal pain and vomiting.   Endocrine: Negative for cold intolerance, heat intolerance, polydipsia, polyphagia and polyuria.   Genitourinary: Negative for decreased urine volume, difficulty urinating, dysuria, enuresis, flank pain, frequency, genital sores, hematuria and urgency.   Musculoskeletal: Positive for arthralgias. Negative for back pain, gait problem, joint swelling, myalgias, neck pain and neck stiffness.   Skin: Negative for color change, pallor, rash and wound.   Allergic/Immunologic: Negative for environmental allergies, food allergies and immunocompromised state.   Neurological: Negative for dizziness, tremors, seizures, syncope, facial asymmetry, speech difficulty, weakness, light-headedness, numbness and headaches.   Hematological: Negative for adenopathy. Does not bruise/bleed easily.   Psychiatric/Behavioral: Negative for agitation, behavioral problems, confusion, decreased concentration, dysphoric mood, hallucinations, self-injury, sleep disturbance and suicidal ideas. The patient is not nervous/anxious and is not hyperactive.        Objective     NEUROLOGICAL EXAMINATION:      MENTAL STATUS:  Alert and oriented.  Speech intact.  Recent and remote memory intact.      CRANIAL NERVES:  Cranial nerve II:  Visual fields are full.  Cranial nerves III, IV and VI:  PERRLADC.  Extraocular movements are intact.  Nystagmus is not present.  Cranial nerve V:  Facial sensation is intact.  Cranial nerve VII:  Muscles of facial expression reveal no  asymmetry.  Cranial nerve VIII:  Hearing is intact.  Cranial nerves IX and X:  Palate elevates symmetrically.  Cranial nerve XI:  Shoulder shrug is intact.  Cranial nerve XII:  Tongue is midline without evidence of atrophy or fasciculation.    MUSCULOSKELETAL:  SLR negative bilaterally    MOTOR: The right calf is slightly diminished in circumference compared to the left.  He has intact dorsiflexion and plantar flexion bilaterally.    SENSATION: Intact to touch over the feet and lower legs.    REFLEXES:  DTR absent in both lower extremities.      Assessment   Lumbar stenosis L4-5 with neurogenic claudication and specific right L5 radiculopathy.  Chronic left L5-S1 disc herniation.  Minimal grade 1 spondylolisthesis L4-5.       Plan   Physical therapy in [place].  Lumbar myelogram to determine if L5-S1 contributes to his symptoms.  Follow-up in [place] after a month of therapy.  If symptoms do not respond appropriately he would be a candidate for minimal access decompressive laminectomy without fusion at L4-5.  Myelography would determine whether additional surgery for decompression L5-S1 on the right or left would be warranted.       Matthew Lazaro MD

## 2019-11-06 ENCOUNTER — TREATMENT (OUTPATIENT)
Dept: PHYSICAL THERAPY | Facility: CLINIC | Age: 74
End: 2019-11-06

## 2019-11-06 DIAGNOSIS — M48.062 SPINAL STENOSIS OF LUMBAR REGION WITH NEUROGENIC CLAUDICATION: Primary | ICD-10-CM

## 2019-11-06 DIAGNOSIS — M43.16 SPONDYLOLISTHESIS OF LUMBAR REGION: ICD-10-CM

## 2019-11-06 DIAGNOSIS — M51.36 BULGING OF LUMBAR INTERVERTEBRAL DISC: ICD-10-CM

## 2019-11-06 PROCEDURE — 97110 THERAPEUTIC EXERCISES: CPT | Performed by: PHYSICAL THERAPIST

## 2019-11-06 PROCEDURE — 97161 PT EVAL LOW COMPLEX 20 MIN: CPT | Performed by: PHYSICAL THERAPIST

## 2019-11-06 PROCEDURE — 97140 MANUAL THERAPY 1/> REGIONS: CPT | Performed by: PHYSICAL THERAPIST

## 2019-11-06 NOTE — PROGRESS NOTES
Physical Therapy Initial Evaluation and Plan of Care      Patient: John Leyva   : 1945  Diagnosis/ICD-10 Code:  No primary diagnosis found.  Referring practitioner: Matthew Lazaro MD    Subjective Evaluation    History of Present Illness  Mechanism of injury: Pt reports problems in the legs for over 10 years.  L LE was the only leg for 10 years but now it shifted to the R LE over the past 6 months.      Now the pain is in the R Hip and the thigh (lateral).  He is having weakness in the R ankle.     Periodic numbness over the past 10 years.       Patient Occupation: retired  Pain  Current pain ratin  At worst pain ratin  Location: R LE   Quality: sharp    Patient Goals  Patient goals for therapy: increased strength, increased motion, improved balance, decreased pain and decreased edema             Objective       Active Range of Motion     Additional Active Range of Motion Details  ROM not tested in a standing position today.     Passive Range of Motion     Right Hip   Flexion: Right hip passive flexion: Limited by tightness and guarding.     Strength/Myotome Testing     Lumbar   Left   Heel walk: abnormal (limited)    Right   Heel walk: abnormal (limited )  Toe walk: abnormal (unable )    Left Ankle/Foot   Dorsiflexion: 2+    Right Ankle/Foot   Dorsiflexion: 3-  Plantar flexion: 2-  Eversion: 1    Tests     Lumbar     Right   Positive passive SLR.     Additional Tests Details  DKTC + for reduced R LE pain and tightness.         Ambulation     Observational Gait   Gait: antalgic and asymmetric   Increased right stance time. Decreased walking speed, stride length and right step length.   Base of support: increased  Right foot contact pattern comments: heel and lateral foot     Quality of Movement During Gait     Knee    Knee (Right): Positive stiff knee.     Ankle    Ankle (Right): Positive supinated.     Additional Quality of Movement During Gait Details  Unable to toe walk on the R LE.             Assessment & Plan     Assessment  Impairments: abnormal muscle tone, abnormal or restricted ROM, activity intolerance, lacks appropriate home exercise program and pain with function  Assessment details: Patient is a 74 year old male who comes to physical therapy with chronic leg pain from lumbar spine.  He has not addressed it for years.  He has severe weakness in the R LE with muscle wasting noted.  He is unfamiliar with unloading and trunk mobilty activity.  He was successful today with some centralization.  We will continue to progress as tolerated and continue to assess response to HEP.      The patient currently has pain, decreased ROM, decreased strength, and inability to perform all essential functional activities. Pt will benefit from skilled PT services to address the above issues.     Prognosis: fair  Functional Limitations: carrying objects, lifting, pulling, pushing, uncomfortable because of pain, sitting, standing, stooping and unable to perform repetitive tasks  Goals  Plan Goals: SHORT TERM GOALS:     2 weeks  1. Pt independent with HEP  2. Pt to demonstrate trunk AROM 50-75% of expected norms to allow for improved ability to perform ADL's  3. Pt to demonstrate bilateral hip strength 4-/5 in all planes to improved stability of the core/trunk     LONG TERM GOALS:   6 weeks  1. Pt to demonstrate trunk AROM 50-75% of expected norms to allow for improved ability to perform functional activities  2. Pt to demonstrate ability to perform 1/2l functional squat with good form and without increased pain in the low back   3. Pt to report being able to work full shift or work in the home without increase in pain in the back  4. Pt to report cessation of pain/numbness/tingling into the bilateral leg to show decreased nerve compression      Plan  Therapy options: will be seen for skilled physical therapy services  Planned modality interventions: cryotherapy, thermotherapy (hydrocollator packs) and electrical  stimulation/Sudanese stimulation  Planned therapy interventions: abdominal trunk stabilization, manual therapy, spinal/joint mobilization, soft tissue mobilization, strengthening, stretching, therapeutic activities, functional ROM exercises, flexibility, body mechanics training, neuromuscular re-education and postural training  Treatment plan discussed with: patient  Plan details: Pt will be seen 2-3 x / week.  Assess Pt response to PREP, posture and body mechanics.         Manual Therapy:    11     mins  59583;  Therapeutic Exercise:    14     mins  53751;     Neuromuscular Cassandra:        mins  48157;    Therapeutic Activity:          mins  94406;     Gait Training:           mins  44555;     Ultrasound:          mins  95484;    Electrical Stimulation:         mins  89411 ( );  Dry Needling          mins self-pay    Timed Treatment:   25   mins   Total Treatment:     50   mins    PT SIGNATURE: Donaldo Thorpe, PT   DATE TREATMENT INITIATED: 11/6/2019    Medicare Initial Certification  Certification Period: 2/4/2020  I certify that the therapy services are furnished while this patient is under my care.  The services outlined above are required by this patient, and will be reviewed every 90 days.     PHYSICIAN: Matthew Lazaro MD      DATE:     Please sign and return via fax to  .. Thank you, [facility].

## 2019-11-12 ENCOUNTER — TREATMENT (OUTPATIENT)
Dept: PHYSICAL THERAPY | Facility: CLINIC | Age: 74
End: 2019-11-12

## 2019-11-12 DIAGNOSIS — M48.062 SPINAL STENOSIS OF LUMBAR REGION WITH NEUROGENIC CLAUDICATION: Primary | ICD-10-CM

## 2019-11-12 DIAGNOSIS — M51.36 BULGING OF LUMBAR INTERVERTEBRAL DISC: ICD-10-CM

## 2019-11-12 DIAGNOSIS — M43.16 SPONDYLOLISTHESIS OF LUMBAR REGION: ICD-10-CM

## 2019-11-12 PROCEDURE — 97140 MANUAL THERAPY 1/> REGIONS: CPT | Performed by: PHYSICAL THERAPIST

## 2019-11-12 PROCEDURE — 97110 THERAPEUTIC EXERCISES: CPT | Performed by: PHYSICAL THERAPIST

## 2019-11-12 NOTE — PROGRESS NOTES
Physical Therapy Daily Progress Note    Patient Information  John Leyva  1945      Visit # : 2    John Leyva reports 1/10 pain today at rest in the knee.  Pt reports that he has been doing the SKTC with the chair frequently but has not done the laying down exercises.  He has been getting up more frequently.  He has not been unloading however during the day.     Pt with nose to knee stretch helpful.      Pain in the posterior R thigh walking in.       Objective Pt presents to PT today with no distress noted.     Pt needed cues on posture and mechanics with HEP reproduction.     Pt with 0/10 pain noted post PT.      See Exercise, Manual, and Modality Logs for complete treatment.     Assessment/Plan  Pt with much improvement overall.  He needs to do more HEP and unloading.       Progress per Plan of Care and Progress strengthening /stabilization /functional activity      Visit Diagnoses:    ICD-10-CM ICD-9-CM   1. Spinal stenosis of lumbar region with neurogenic claudication M48.062 724.03   2. Spondylolisthesis of lumbar region M43.16 738.4   3. Bulging of lumbar intervertebral disc M51.26 722.10            Manual Therapy:    10     mins  69562;  Therapeutic Exercise:    44     mins  13093;     Neuromuscular Cassandra:        mins  22318;    Therapeutic Activity:          mins  73105;     Gait Training:        ___  mins  38174;     Ultrasound:          mins  76205;    Electrical Stimulation:         mins  40432 ( );  Dry Needling          mins self-pay    Timed Treatment:   54   mins   Total Treatment:     54   mins    Donaldo Thorpe, PT  Physical Therapist

## 2019-11-14 ENCOUNTER — TREATMENT (OUTPATIENT)
Dept: PHYSICAL THERAPY | Facility: CLINIC | Age: 74
End: 2019-11-14

## 2019-11-14 DIAGNOSIS — M43.16 SPONDYLOLISTHESIS OF LUMBAR REGION: ICD-10-CM

## 2019-11-14 DIAGNOSIS — M48.062 SPINAL STENOSIS OF LUMBAR REGION WITH NEUROGENIC CLAUDICATION: Primary | ICD-10-CM

## 2019-11-14 DIAGNOSIS — M51.36 BULGING OF LUMBAR INTERVERTEBRAL DISC: ICD-10-CM

## 2019-11-14 PROCEDURE — 97530 THERAPEUTIC ACTIVITIES: CPT | Performed by: PHYSICAL THERAPIST

## 2019-11-14 PROCEDURE — 97140 MANUAL THERAPY 1/> REGIONS: CPT | Performed by: PHYSICAL THERAPIST

## 2019-11-14 PROCEDURE — 97110 THERAPEUTIC EXERCISES: CPT | Performed by: PHYSICAL THERAPIST

## 2019-11-14 NOTE — PROGRESS NOTES
Physical Therapy Daily Progress Note    Patient Information  John Leyva  1945      Visit # : 3    John Leyva reports 0/10 pain today at rest.  Pt reports 4 hours post PT he started having some soreness and pain. In the R thigh and R hip.      Pt reports he is still not unloading at all.    Objective Pt presents to PT today with no distress at rest.   Ambulation is still limited on the R LE due to weakness.     Pt with trial of L/S Ext in unloaded position with no pain during the activity.     See Exercise, Manual, and Modality Logs for complete treatment.     Assessment/Plan  Pt with good response to PT.  He still is not unloading at home.  I don't think he understands the process correctly.       Progress strengthening /stabilization /functional activity  Check to see if he has been unloading.     Visit Diagnoses:    ICD-10-CM ICD-9-CM   1. Spinal stenosis of lumbar region with neurogenic claudication M48.062 724.03   2. Spondylolisthesis of lumbar region M43.16 738.4   3. Bulging of lumbar intervertebral disc M51.26 722.10            Manual Therapy:    11     mins  24862;  Therapeutic Exercise:    16     mins  63409;     Neuromuscular Cassandra:        mins  30627;    Therapeutic Activity:     13     mins  21509;     Gait Training:        ___  mins  91960;     Ultrasound:          mins  77964;    Electrical Stimulation:         mins  20727 ( );  Dry Needling          mins self-pay    Timed Treatment:   40   mins   Total Treatment:     40   mins    Donaldo Thorpe, PT  Physical Therapist

## 2019-11-15 ENCOUNTER — TELEPHONE (OUTPATIENT)
Dept: INTERNAL MEDICINE | Facility: CLINIC | Age: 74
End: 2019-11-15

## 2019-11-15 RX ORDER — LISINOPRIL AND HYDROCHLOROTHIAZIDE 12.5; 1 MG/1; MG/1
1 TABLET ORAL DAILY
Qty: 30 TABLET | Refills: 0 | Status: SHIPPED | OUTPATIENT
Start: 2019-11-15 | End: 2019-12-13 | Stop reason: SDUPTHER

## 2019-11-15 NOTE — TELEPHONE ENCOUNTER
Patient's wife called to request a refill for the patient for the lisinopril-hydrochlorothiazide (PRINZIDE,ZESTORETIC) 10-12.5 MG per tablet. The patient's wife said that they have switched pharmacies and they are now using [facility] on [facility] for their medications. If there are any questions or concerns please call the patient's wife back at [phone] or [facility] at [phone].

## 2019-11-18 ENCOUNTER — TREATMENT (OUTPATIENT)
Dept: PHYSICAL THERAPY | Facility: CLINIC | Age: 74
End: 2019-11-18

## 2019-11-18 DIAGNOSIS — M51.36 BULGING OF LUMBAR INTERVERTEBRAL DISC: ICD-10-CM

## 2019-11-18 DIAGNOSIS — M48.062 SPINAL STENOSIS OF LUMBAR REGION WITH NEUROGENIC CLAUDICATION: Primary | ICD-10-CM

## 2019-11-18 DIAGNOSIS — M43.16 SPONDYLOLISTHESIS OF LUMBAR REGION: ICD-10-CM

## 2019-11-18 PROCEDURE — 97110 THERAPEUTIC EXERCISES: CPT | Performed by: PHYSICAL THERAPIST

## 2019-11-18 PROCEDURE — 97140 MANUAL THERAPY 1/> REGIONS: CPT | Performed by: PHYSICAL THERAPIST

## 2019-11-18 NOTE — PROGRESS NOTES
Physical Therapy Daily Progress Note    Patient Information  John Leyva  1945      Visit # : 4    John Leyva reports 0/10 pain today at rest.  Pt reports periodic hip and thigh pain and it is not worse than what it used to be.       Pt reports he is more flexible and mobile over the past few weeks.     He reports only having leg pain with prolonged seated position at home.  Pt reports that the EDWIGE stretch seemed to help and did not cause elevated pain.    Objective Pt presents to PT today with no distress noted     Pt with no LE pain noted with standing and Supine position.      SLR -  B LE      See Exercise, Manual, and Modality Logs for complete treatment.     Assessment/Plan  Pt with improved overall status.  Pain is less and function is more.       Progress per Plan of Care and Progress strengthening /stabilization /functional activity      Visit Diagnoses:    ICD-10-CM ICD-9-CM   1. Spinal stenosis of lumbar region with neurogenic claudication M48.062 724.03   2. Spondylolisthesis of lumbar region M43.16 738.4   3. Bulging of lumbar intervertebral disc M51.26 722.10            Manual Therapy:    12     mins  84141;  Therapeutic Exercise:    42     mins  88406;     Neuromuscular Cassandra:        mins  33176;    Therapeutic Activity:          mins  32573;     Gait Training:        ___  mins  49273;     Ultrasound:          mins  72272;    Electrical Stimulation:         mins  31006 ( );  Dry Needling          mins self-pay    Timed Treatment:   54   mins   Total Treatment:     54   mins    Donaldo Thorpe, PT  Physical Therapist

## 2019-11-21 ENCOUNTER — TREATMENT (OUTPATIENT)
Dept: PHYSICAL THERAPY | Facility: CLINIC | Age: 74
End: 2019-11-21

## 2019-11-21 DIAGNOSIS — M51.36 BULGING OF LUMBAR INTERVERTEBRAL DISC: ICD-10-CM

## 2019-11-21 DIAGNOSIS — M43.16 SPONDYLOLISTHESIS OF LUMBAR REGION: ICD-10-CM

## 2019-11-21 DIAGNOSIS — M48.062 SPINAL STENOSIS OF LUMBAR REGION WITH NEUROGENIC CLAUDICATION: Primary | ICD-10-CM

## 2019-11-21 PROCEDURE — 97530 THERAPEUTIC ACTIVITIES: CPT | Performed by: PHYSICAL THERAPIST

## 2019-11-21 PROCEDURE — 97110 THERAPEUTIC EXERCISES: CPT | Performed by: PHYSICAL THERAPIST

## 2019-11-21 NOTE — PROGRESS NOTES
Physical Therapy Daily Progress Note    Patient Information  John Leyva  1945      Visit # : 5    John Leyva reports 0/10 pain today at rest.  Pt reports his leg is hurting periodically at night in the R LE.          Objective Pt presents to PT today with no distress noted.      Pt with improved ambulation and use of the R LE with functional activity.       See Exercise, Manual, and Modality Logs for complete treatment.     Assessment/Plan  Pt with improved ambulation and endurance.  He is having some R LE pain but it is random.       Progress per Plan of Care and Progress strengthening /stabilization /functional activity      Visit Diagnoses:    ICD-10-CM ICD-9-CM   1. Spinal stenosis of lumbar region with neurogenic claudication M48.062 724.03   2. Spondylolisthesis of lumbar region M43.16 738.4   3. Bulging of lumbar intervertebral disc M51.26 722.10            Manual Therapy:    5     mins  44630;  Therapeutic Exercise:    31     mins  14011;     Neuromuscular Cassandra:        mins  40964;    Therapeutic Activity:     9     mins  47643;     Gait Training:        ___  mins  98165;     Ultrasound:          mins  77175;    Electrical Stimulation:         mins  16267 ( );  Dry Needling          mins self-pay    Timed Treatment:   45   mins   Total Treatment:     45   mins    Donaldo Thorpe, PT  Physical Therapist

## 2019-11-26 ENCOUNTER — TREATMENT (OUTPATIENT)
Dept: PHYSICAL THERAPY | Facility: CLINIC | Age: 74
End: 2019-11-26

## 2019-11-26 DIAGNOSIS — M48.062 SPINAL STENOSIS OF LUMBAR REGION WITH NEUROGENIC CLAUDICATION: Primary | ICD-10-CM

## 2019-11-26 DIAGNOSIS — M51.36 BULGING OF LUMBAR INTERVERTEBRAL DISC: ICD-10-CM

## 2019-11-26 DIAGNOSIS — M43.16 SPONDYLOLISTHESIS OF LUMBAR REGION: ICD-10-CM

## 2019-11-26 PROCEDURE — 97110 THERAPEUTIC EXERCISES: CPT | Performed by: PHYSICAL THERAPIST

## 2019-11-26 PROCEDURE — 97140 MANUAL THERAPY 1/> REGIONS: CPT | Performed by: PHYSICAL THERAPIST

## 2019-11-26 PROCEDURE — 97530 THERAPEUTIC ACTIVITIES: CPT | Performed by: PHYSICAL THERAPIST

## 2019-11-26 NOTE — PROGRESS NOTES
Physical Therapy Daily Progress Note    Patient Information  John Leyva  1945      Visit # : 6    John Leyva reports 1/10 pain today at rest.  Pt reports he had 2 nights of complete relief of leg pain while sleeping. Pt reports that this was a first to be so pain free.     R LE night pain last night 3/10 in the hip and thigh.       Objective Pt presents to PT today with no distress at rest.      Pt with improved mobility after stretching of the LE's.      Pt seems to be doing the tilt board better with more ankle activity.     See Exercise, Manual, and Modality Logs for complete treatment.     Assessment/Plan  Pt with improved function and less pain.  His ankle mobility seems to be improving and his pain is much less.       Progress per Plan of Care and Progress strengthening /stabilization /functional activity      Visit Diagnoses:    ICD-10-CM ICD-9-CM   1. Spinal stenosis of lumbar region with neurogenic claudication M48.062 724.03   2. Spondylolisthesis of lumbar region M43.16 738.4   3. Bulging of lumbar intervertebral disc M51.26 722.10            Manual Therapy:    12     mins  77528;  Therapeutic Exercise:      31   mins  37220;     Neuromuscular Cassandra:        mins  35310;    Therapeutic Activity:     11     mins  12072;     Gait Training:        ___  mins  11537;     Ultrasound:          mins  15011;    Electrical Stimulation:         mins  95779 ( );  Dry Needling          mins self-pay    Timed Treatment:   54   mins   Total Treatment:     54   mins    Donaldo Thorpe, PT  Physical Therapist

## 2019-12-03 ENCOUNTER — TREATMENT (OUTPATIENT)
Dept: PHYSICAL THERAPY | Facility: CLINIC | Age: 74
End: 2019-12-03

## 2019-12-03 DIAGNOSIS — M51.36 BULGING OF LUMBAR INTERVERTEBRAL DISC: ICD-10-CM

## 2019-12-03 DIAGNOSIS — M48.062 SPINAL STENOSIS OF LUMBAR REGION WITH NEUROGENIC CLAUDICATION: Primary | ICD-10-CM

## 2019-12-03 DIAGNOSIS — M43.16 SPONDYLOLISTHESIS OF LUMBAR REGION: ICD-10-CM

## 2019-12-03 PROCEDURE — 97110 THERAPEUTIC EXERCISES: CPT | Performed by: PHYSICAL THERAPIST

## 2019-12-03 PROCEDURE — 97530 THERAPEUTIC ACTIVITIES: CPT | Performed by: PHYSICAL THERAPIST

## 2019-12-03 PROCEDURE — 97140 MANUAL THERAPY 1/> REGIONS: CPT | Performed by: PHYSICAL THERAPIST

## 2019-12-03 NOTE — PROGRESS NOTES
Physical Therapy Daily Progress Note    Patient Information  John Leyva  1945      Visit # : 7    John Leyva reports 0/10 pain today at rest.  Pt reports he had relief of leg pain for 2-3 days after PT.        Leg pain with lifting the leg.     Pt reports he still feels like 15 minutes is his max in standing.        Objective Pt presents to PT today with no distress.     Pt with good response to PT.  He seems to be moving better.     MMT:  DF R LE 4/5,  L LE 4/5,  Eversion R LE 2/5    Heel walking has improved and he can clear B LE with DF.  Toe walking is still limited on the R LE.    See Exercise, Manual, and Modality Logs for complete treatment.     Assessment/Plan  Pt with improved LE sxs and strength.  He is getting stronger but I fear continued compression on the nerve.       Progress per Plan of Care      Visit Diagnoses:    ICD-10-CM ICD-9-CM   1. Spinal stenosis of lumbar region with neurogenic claudication M48.062 724.03   2. Spondylolisthesis of lumbar region M43.16 738.4   3. Bulging of lumbar intervertebral disc M51.26 722.10            Manual Therapy:    12     mins  87220;  Therapeutic Exercise:    28     mins  88723;     Neuromuscular Cassandra:        mins  98276;    Therapeutic Activity:     13     mins  88921;     Gait Training:        ___  mins  55645;     Ultrasound:          mins  96003;    Electrical Stimulation:         mins  36251 ( );  Dry Needling          mins self-pay    Timed Treatment:   53   mins   Total Treatment:     66   mins    Donaldo Thorpe, PT  Physical Therapist

## 2019-12-06 ENCOUNTER — TREATMENT (OUTPATIENT)
Dept: PHYSICAL THERAPY | Facility: CLINIC | Age: 74
End: 2019-12-06

## 2019-12-06 DIAGNOSIS — M51.36 BULGING OF LUMBAR INTERVERTEBRAL DISC: ICD-10-CM

## 2019-12-06 DIAGNOSIS — M48.062 SPINAL STENOSIS OF LUMBAR REGION WITH NEUROGENIC CLAUDICATION: Primary | ICD-10-CM

## 2019-12-06 DIAGNOSIS — M43.16 SPONDYLOLISTHESIS OF LUMBAR REGION: ICD-10-CM

## 2019-12-06 PROCEDURE — 97110 THERAPEUTIC EXERCISES: CPT | Performed by: PHYSICAL THERAPIST

## 2019-12-06 PROCEDURE — 97140 MANUAL THERAPY 1/> REGIONS: CPT | Performed by: PHYSICAL THERAPIST

## 2019-12-06 NOTE — PROGRESS NOTES
Physical Therapy Daily Progress Note    Patient Information  John Leyva  1945      Visit # : 8    John Leyva reports 0/10 pain today at rest.  Pt reports the R hip and Leg are just occasional in the pain.        Objective Pt presents to PT today with no distress noted.     Pt continues to improve with PT exercises and have less pain doing so.      See Exercise, Manual, and Modality Logs for complete treatment.     Assessment/Plan  Pt with improved tolerance to exercise and stretching.       Progress per Plan of Care and Progress strengthening /stabilization /functional activity      Visit Diagnoses:    ICD-10-CM ICD-9-CM   1. Spinal stenosis of lumbar region with neurogenic claudication M48.062 724.03   2. Spondylolisthesis of lumbar region M43.16 738.4   3. Bulging of lumbar intervertebral disc M51.26 722.10            Manual Therapy:    9     mins  07470;  Therapeutic Exercise:    34     mins  70365;     Neuromuscular Cassandra:        mins  09145;    Therapeutic Activity:          mins  33693;     Gait Training:        ___  mins  61360;     Ultrasound:          mins  08691;    Electrical Stimulation:         mins  42781 ( );  Dry Needling          mins self-pay    Timed Treatment:   43   mins   Total Treatment:     43   mins    Donaldo Thorpe, PT  Physical Therapist

## 2019-12-10 ENCOUNTER — TREATMENT (OUTPATIENT)
Dept: PHYSICAL THERAPY | Facility: CLINIC | Age: 74
End: 2019-12-10

## 2019-12-10 DIAGNOSIS — M43.16 SPONDYLOLISTHESIS OF LUMBAR REGION: ICD-10-CM

## 2019-12-10 DIAGNOSIS — M48.062 SPINAL STENOSIS OF LUMBAR REGION WITH NEUROGENIC CLAUDICATION: Primary | ICD-10-CM

## 2019-12-10 DIAGNOSIS — M51.36 BULGING OF LUMBAR INTERVERTEBRAL DISC: ICD-10-CM

## 2019-12-10 PROCEDURE — 97110 THERAPEUTIC EXERCISES: CPT | Performed by: PHYSICAL THERAPIST

## 2019-12-10 PROCEDURE — 97530 THERAPEUTIC ACTIVITIES: CPT | Performed by: PHYSICAL THERAPIST

## 2019-12-10 RX ORDER — ATORVASTATIN CALCIUM 20 MG/1
TABLET, FILM COATED ORAL
Qty: 90 TABLET | Refills: 1 | Status: SHIPPED | OUTPATIENT
Start: 2019-12-10 | End: 2020-02-19

## 2019-12-10 NOTE — PROGRESS NOTES
Physical Therapy Daily Progress Note    Patient Information  John Leyva  1945      Visit # : 9    John Leyva reports 2/10 pain today at rest.  Pt reports he had a real bad day 3/10 pain in the hip and leg on the R LE.         Objective Pt presents to PT today with moderate distress noted.  He is standing with moderate side bending to the L.    L side bending into PT area.  Less WB on the R LE     R LE pain is radicular in nature.  Correction of shift elevated LE pain.    EDWIGE stretch seemed to help reduce R LE sxs.       See Exercise, Manual, and Modality Logs for complete treatment.     Assessment/Plan  Pt with improved overall condition.  He is still having pain in the hip with certain activity.  He seemed to have less pain from lumbar extension activity.       Progress per Plan of Care and Progress strengthening /stabilization /functional activity  Check response to lumbar ext in prone.   Visit Diagnoses:    ICD-10-CM ICD-9-CM   1. Spinal stenosis of lumbar region with neurogenic claudication M48.062 724.03   2. Spondylolisthesis of lumbar region M43.16 738.4   3. Bulging of lumbar intervertebral disc M51.26 722.10            Manual Therapy:    7     mins  43159;  Therapeutic Exercise:    24     mins  10362;     Neuromuscular Cassandra:        mins  02897;    Therapeutic Activity:      10    mins  46664;     Gait Training:        ___  mins  27192;     Ultrasound:          mins  42438;    Electrical Stimulation:         mins  39866 ( );  Dry Needling          mins self-pay    Timed Treatment:   41   mins   Total Treatment:     41   mins    Donaldo Thorpe, PT  Physical Therapist

## 2019-12-12 ENCOUNTER — TREATMENT (OUTPATIENT)
Dept: PHYSICAL THERAPY | Facility: CLINIC | Age: 74
End: 2019-12-12

## 2019-12-12 DIAGNOSIS — M48.062 SPINAL STENOSIS OF LUMBAR REGION WITH NEUROGENIC CLAUDICATION: Primary | ICD-10-CM

## 2019-12-12 DIAGNOSIS — M51.36 BULGING OF LUMBAR INTERVERTEBRAL DISC: ICD-10-CM

## 2019-12-12 DIAGNOSIS — M43.16 SPONDYLOLISTHESIS OF LUMBAR REGION: ICD-10-CM

## 2019-12-12 PROCEDURE — 97110 THERAPEUTIC EXERCISES: CPT | Performed by: PHYSICAL THERAPIST

## 2019-12-12 NOTE — PROGRESS NOTES
Physical Therapy Daily Progress Note    Patient Information  John Leyva  1945      Visit # : 10    John Leyva reports 1/10 pain today at rest.  Pt reports that the EDWIGE position seemed to reduce his pain in the hip and leg over the past few days.         Objective Pt presents to PT today with no distress noted.     Pt still wearing broken down shoe laterally on the R LE.      R hip stretching did show tightness on the lateral hip / IT band area.     See Exercise, Manual, and Modality Logs for complete treatment.     Assessment/Plan  Pt with overall improvement.  THe EDWIGE position seems to reduce his pain today.       Progress per Plan of Care and Progress strengthening /stabilization /functional activity  Progress LE strengthening.     Visit Diagnoses:    ICD-10-CM ICD-9-CM   1. Spinal stenosis of lumbar region with neurogenic claudication M48.062 724.03   2. Spondylolisthesis of lumbar region M43.16 738.4   3. Bulging of lumbar intervertebral disc M51.26 722.10            Manual Therapy:    5     mins  91150;  Therapeutic Exercise:    38    mins  45839;     Neuromuscular Cassandra:        mins  95672;    Therapeutic Activity:          mins  23209;     Gait Training:        ___  mins  58057;     Ultrasound:          mins  31447;    Electrical Stimulation:         mins  98623 ( );  Dry Needling          mins self-pay    Timed Treatment:   43   mins   Total Treatment:     63   mins    Donaldo Thorpe, PT  Physical Therapist

## 2019-12-16 RX ORDER — LISINOPRIL AND HYDROCHLOROTHIAZIDE 12.5; 1 MG/1; MG/1
1 TABLET ORAL DAILY
Qty: 90 TABLET | Refills: 3 | Status: SHIPPED | OUTPATIENT
Start: 2019-12-16 | End: 2020-12-11

## 2019-12-17 ENCOUNTER — TREATMENT (OUTPATIENT)
Dept: PHYSICAL THERAPY | Facility: CLINIC | Age: 74
End: 2019-12-17

## 2019-12-17 DIAGNOSIS — M43.16 SPONDYLOLISTHESIS OF LUMBAR REGION: ICD-10-CM

## 2019-12-17 DIAGNOSIS — M51.36 BULGING OF LUMBAR INTERVERTEBRAL DISC: ICD-10-CM

## 2019-12-17 DIAGNOSIS — M48.062 SPINAL STENOSIS OF LUMBAR REGION WITH NEUROGENIC CLAUDICATION: Primary | ICD-10-CM

## 2019-12-17 PROCEDURE — 97140 MANUAL THERAPY 1/> REGIONS: CPT | Performed by: PHYSICAL THERAPIST

## 2019-12-17 PROCEDURE — 97110 THERAPEUTIC EXERCISES: CPT | Performed by: PHYSICAL THERAPIST

## 2019-12-19 ENCOUNTER — TELEPHONE (OUTPATIENT)
Dept: INTERNAL MEDICINE | Facility: CLINIC | Age: 74
End: 2019-12-19

## 2019-12-19 ENCOUNTER — TREATMENT (OUTPATIENT)
Dept: PHYSICAL THERAPY | Facility: CLINIC | Age: 74
End: 2019-12-19

## 2019-12-19 DIAGNOSIS — M51.36 BULGING OF LUMBAR INTERVERTEBRAL DISC: ICD-10-CM

## 2019-12-19 DIAGNOSIS — M43.16 SPONDYLOLISTHESIS OF LUMBAR REGION: ICD-10-CM

## 2019-12-19 DIAGNOSIS — M48.062 SPINAL STENOSIS OF LUMBAR REGION WITH NEUROGENIC CLAUDICATION: Primary | ICD-10-CM

## 2019-12-19 PROCEDURE — 97110 THERAPEUTIC EXERCISES: CPT | Performed by: PHYSICAL THERAPIST

## 2019-12-19 PROCEDURE — 97140 MANUAL THERAPY 1/> REGIONS: CPT | Performed by: PHYSICAL THERAPIST

## 2019-12-19 RX ORDER — AMLODIPINE BESYLATE 5 MG/1
5 TABLET ORAL DAILY
Qty: 30 TABLET | Refills: 3 | Status: SHIPPED | OUTPATIENT
Start: 2019-12-19 | End: 2020-04-07 | Stop reason: SDUPTHER

## 2019-12-19 NOTE — PROGRESS NOTES
Physical Therapy Daily Progress Note    Patient Information  John Leyva  1945      Visit # : 12    John Leyva reports 1/10 pain today at rest.  Pt did call the MD and he is waiting on a call back from the office.  Pt reports the R Leg buckled underneath him.         Objective Pt presents to PT today with no distress.     AROM:  R ankle eversion almost to neutral and he was able to hold that position against resistance.     MMT:  R ankle eversion still 2-/5 on the R LE.  L LE ankle motions 4-/5.        See Exercise, Manual, and Modality Logs for complete treatment.     Assessment/Plan  Pt with improvements in the L LE greater than the R LE.  He seems to be getting stronger in the R LE slowly.  He reports more giving way of the R LE which is concerning and we need to keep an eye on that.       Progress per Plan of Care and Progress strengthening /stabilization /functional activity      Visit Diagnoses:    ICD-10-CM ICD-9-CM   1. Spinal stenosis of lumbar region with neurogenic claudication M48.062 724.03   2. Spondylolisthesis of lumbar region M43.16 738.4   3. Bulging of lumbar intervertebral disc M51.26 722.10            Manual Therapy:    11     mins  19832;  Therapeutic Exercise:    43     mins  91532;     Neuromuscular Cassandra:        mins  10361;    Therapeutic Activity:          mins  46214;     Gait Training:        ___  mins  20615;     Ultrasound:          mins  48170;    Electrical Stimulation:         mins  89872 ( );  Dry Needling          mins self-pay    Timed Treatment:   54   mins   Total Treatment:     54   mins    Donaldo Thorpe, PT  Physical Therapist

## 2019-12-23 ENCOUNTER — TELEPHONE (OUTPATIENT)
Dept: INTERNAL MEDICINE | Facility: CLINIC | Age: 74
End: 2019-12-23

## 2019-12-23 RX ORDER — MELOXICAM 7.5 MG/1
7.5 TABLET ORAL 2 TIMES DAILY PRN
Qty: 60 TABLET | Refills: 1 | Status: SHIPPED | OUTPATIENT
Start: 2019-12-23 | End: 2020-02-11 | Stop reason: SDUPTHER

## 2019-12-23 NOTE — TELEPHONE ENCOUNTER
Pt wife called to get a refill of the selected RX. He as few days left.    [facility]

## 2019-12-24 ENCOUNTER — TREATMENT (OUTPATIENT)
Dept: PHYSICAL THERAPY | Facility: CLINIC | Age: 74
End: 2019-12-24

## 2019-12-24 DIAGNOSIS — M51.36 BULGING OF LUMBAR INTERVERTEBRAL DISC: ICD-10-CM

## 2019-12-24 DIAGNOSIS — M48.062 SPINAL STENOSIS OF LUMBAR REGION WITH NEUROGENIC CLAUDICATION: Primary | ICD-10-CM

## 2019-12-24 DIAGNOSIS — M43.16 SPONDYLOLISTHESIS OF LUMBAR REGION: ICD-10-CM

## 2019-12-24 PROCEDURE — 97140 MANUAL THERAPY 1/> REGIONS: CPT | Performed by: PHYSICAL THERAPIST

## 2019-12-24 PROCEDURE — 97110 THERAPEUTIC EXERCISES: CPT | Performed by: PHYSICAL THERAPIST

## 2019-12-24 NOTE — PROGRESS NOTES
Physical Therapy Daily Progress Note    Patient Information  John Leyva  1945      Visit # : 13    John Leyva reports 0/10 pain today at rest.  Pt reports he has pain climbing steps in the R hip and leg.  Pt reports he can sleep better.         Objective Pt presents to PT today with no distress noted.     R hip ER stretch still limited in ROM but the pain is only laterally.  He is able to tolerate more activity but he still requires some feedback for the exericses      See Exercise, Manual, and Modality Logs for complete treatment.     Assessment/Plan  Pt with limited improvements from visit to visit but overall the L LE strength seems to really be improving.       Progress per Plan of Care and Progress strengthening /stabilization /functional activity      Visit Diagnoses:    ICD-10-CM ICD-9-CM   1. Spinal stenosis of lumbar region with neurogenic claudication M48.062 724.03   2. Spondylolisthesis of lumbar region M43.16 738.4   3. Bulging of lumbar intervertebral disc M51.26 722.10            Manual Therapy:    11     mins  24640;  Therapeutic Exercise:    42     mins  71630;     Neuromuscular Cassandra:        mins  83536;    Therapeutic Activity:          mins  72779;     Gait Training:        ___  mins  58209;     Ultrasound:          mins  09535;    Electrical Stimulation:         mins  72169 ( );  Dry Needling          mins self-pay    Timed Treatment:   53   mins   Total Treatment:     62   mins    Donaldo Thorpe, PT  Physical Therapist

## 2019-12-26 ENCOUNTER — TREATMENT (OUTPATIENT)
Dept: PHYSICAL THERAPY | Facility: CLINIC | Age: 74
End: 2019-12-26

## 2019-12-26 DIAGNOSIS — M43.16 SPONDYLOLISTHESIS OF LUMBAR REGION: ICD-10-CM

## 2019-12-26 DIAGNOSIS — M48.062 SPINAL STENOSIS OF LUMBAR REGION WITH NEUROGENIC CLAUDICATION: Primary | ICD-10-CM

## 2019-12-26 DIAGNOSIS — M51.36 BULGING OF LUMBAR INTERVERTEBRAL DISC: ICD-10-CM

## 2019-12-26 PROCEDURE — 97140 MANUAL THERAPY 1/> REGIONS: CPT | Performed by: PHYSICAL THERAPIST

## 2019-12-26 PROCEDURE — 97110 THERAPEUTIC EXERCISES: CPT | Performed by: PHYSICAL THERAPIST

## 2019-12-26 NOTE — PROGRESS NOTES
Physical Therapy Daily Progress Note    Patient Information  John Leyva  1945      Visit # : 14    John Leyva reports 1/10 pain today at rest.  Yesterday the pain the pain in the leg was 2/10 from being up on it.          Objective Pt presents to PT today with no distress noted.     Pt with R hip/ thigh pain still occurring with certain motions of the hip and spine.      R LE eversion still limited.     Sidestepping R LE is still limited and I fear him rolling his ankle due to no strength.     See Exercise, Manual, and Modality Logs for complete treatment.     Assessment/Plan  Pt with R LE sxs still present and R LE eversion weakness still present.       Progress per Plan of Care and Progress strengthening /stabilization /functional activity  Check shoe again for lateral wear.     Visit Diagnoses:    ICD-10-CM ICD-9-CM   1. Spinal stenosis of lumbar region with neurogenic claudication M48.062 724.03   2. Spondylolisthesis of lumbar region M43.16 738.4   3. Bulging of lumbar intervertebral disc M51.26 722.10            Manual Therapy:    11     mins  56108;  Therapeutic Exercise:    43     mins  18390;     Neuromuscular Cassandra:        mins  88100;    Therapeutic Activity:          mins  38108;     Gait Training:        ___  mins  96793;     Ultrasound:          mins  87319;    Electrical Stimulation:         mins  02173 ( );  Dry Needling          mins self-pay    Timed Treatment:   54   mins   Total Treatment:     54   mins    Donaldo Thorpe, PT  Physical Therapist

## 2019-12-30 ENCOUNTER — TREATMENT (OUTPATIENT)
Dept: PHYSICAL THERAPY | Facility: CLINIC | Age: 74
End: 2019-12-30

## 2019-12-30 DIAGNOSIS — M43.16 SPONDYLOLISTHESIS OF LUMBAR REGION: ICD-10-CM

## 2019-12-30 DIAGNOSIS — M51.36 BULGING OF LUMBAR INTERVERTEBRAL DISC: ICD-10-CM

## 2019-12-30 DIAGNOSIS — M48.062 SPINAL STENOSIS OF LUMBAR REGION WITH NEUROGENIC CLAUDICATION: Primary | ICD-10-CM

## 2019-12-30 PROCEDURE — 97110 THERAPEUTIC EXERCISES: CPT | Performed by: PHYSICAL THERAPIST

## 2019-12-30 NOTE — PROGRESS NOTES
Physical Therapy Daily Progress Note    Patient Information  John Leyva  1945      Visit # : 15    John Leyva reports 0/10 pain today at rest.  Pt reports 3 days of relief.         Objective Pt presents to PT today with no distress noted.     Pt with active R ankle eversion limited and strength is limited as well.       See Exercise, Manual, and Modality Logs for complete treatment.     Assessment/Plan  Pt with pain and ROM improved in the hips, spine, and R LE but the R ankle eversions strength is still the same as it was at day one.        Progress per Plan of Care and Progress strengthening /stabilization /functional activity  Check about brace for the lateral support of ankle.     Visit Diagnoses:    ICD-10-CM ICD-9-CM   1. Spinal stenosis of lumbar region with neurogenic claudication M48.062 724.03   2. Spondylolisthesis of lumbar region M43.16 738.4   3. Bulging of lumbar intervertebral disc M51.26 722.10            Manual Therapy:         mins  29628;  Therapeutic Exercise:    54     mins  61705;     Neuromuscular Cassandra:        mins  81967;    Therapeutic Activity:          mins  50254;     Gait Training:        ___  mins  65253;     Ultrasound:          mins  84790;    Electrical Stimulation:         mins  37293 ( );  Dry Needling          mins self-pay    Timed Treatment:   54   mins   Total Treatment:     54   mins    Donaldo Thorpe, PT  Physical Therapist

## 2020-01-03 ENCOUNTER — TREATMENT (OUTPATIENT)
Dept: PHYSICAL THERAPY | Facility: CLINIC | Age: 75
End: 2020-01-03

## 2020-01-03 DIAGNOSIS — M51.36 BULGING OF LUMBAR INTERVERTEBRAL DISC: ICD-10-CM

## 2020-01-03 DIAGNOSIS — M48.062 SPINAL STENOSIS OF LUMBAR REGION WITH NEUROGENIC CLAUDICATION: Primary | ICD-10-CM

## 2020-01-03 DIAGNOSIS — M43.16 SPONDYLOLISTHESIS OF LUMBAR REGION: ICD-10-CM

## 2020-01-03 PROCEDURE — 97530 THERAPEUTIC ACTIVITIES: CPT | Performed by: PHYSICAL THERAPIST

## 2020-01-03 PROCEDURE — 97110 THERAPEUTIC EXERCISES: CPT | Performed by: PHYSICAL THERAPIST

## 2020-01-03 NOTE — PROGRESS NOTES
Physical Therapy Daily Progress Note    Patient Information  John Leyva  1945      Visit # : 16    John Leyva reports 0/10 pain today at rest.  Pt with tinges of pain when walking up stairs.  He reports that he only had one episode of giving way yesterday.         Objective Pt presents to PT today with no distress noted.  Pt is ambulating with slight gait deficit due to R ankle weakness.     Pt brought his old shoes today to assess.  His shoes even from 1-2 years ago show a sign over over wear laterally more on the R LE.      Pt with ambulation still limited due to the R ankle not having good lateral support.        See Exercise, Manual, and Modality Logs for complete treatment.     Assessment/Plan  Pt with brace needed on the R ankle since no significant improvements made on the R ankle eversion.  Pt may benefit from active ankle brace.  Out orthotic rep will bring one by next week to attempt.     Progress per Plan of Care and Progress strengthening /stabilization /functional activity        Visit Diagnoses:    ICD-10-CM ICD-9-CM   1. Spinal stenosis of lumbar region with neurogenic claudication M48.062 724.03   2. Spondylolisthesis of lumbar region M43.16 738.4   3. Bulging of lumbar intervertebral disc M51.26 722.10            Manual Therapy:         mins  29619;  Therapeutic Exercise:    25     mins  53765;     Neuromuscular Cassandra:        mins  25365;    Therapeutic Activity:     13     mins  36831;     Gait Training:        ___  mins  27823;     Ultrasound:          mins  71762;    Electrical Stimulation:         mins  83246 ( );  Dry Needling          mins self-pay    Timed Treatment:   39   mins   Total Treatment:     51   mins    Donaldo Thorpe, PT  Physical Therapist

## 2020-01-07 ENCOUNTER — TREATMENT (OUTPATIENT)
Dept: PHYSICAL THERAPY | Facility: CLINIC | Age: 75
End: 2020-01-07

## 2020-01-07 DIAGNOSIS — M51.36 BULGING OF LUMBAR INTERVERTEBRAL DISC: ICD-10-CM

## 2020-01-07 DIAGNOSIS — M48.062 SPINAL STENOSIS OF LUMBAR REGION WITH NEUROGENIC CLAUDICATION: Primary | ICD-10-CM

## 2020-01-07 DIAGNOSIS — M43.16 SPONDYLOLISTHESIS OF LUMBAR REGION: ICD-10-CM

## 2020-01-07 PROCEDURE — 97530 THERAPEUTIC ACTIVITIES: CPT | Performed by: PHYSICAL THERAPIST

## 2020-01-07 PROCEDURE — 97110 THERAPEUTIC EXERCISES: CPT | Performed by: PHYSICAL THERAPIST

## 2020-01-07 NOTE — PROGRESS NOTES
Physical Therapy Daily Progress Note    Patient Information  John Leyva  1945      Visit # : 17    John Leyva reports 1/10 pain today at rest.  Pt with with lateral thigh discomfort and burning.   PT sessions seem to give relief for several days.      Pt reports the R LE gives away randomly.      Objective Pt presents to PT today with no distress noted.     Pt with good ability to perform exercises without elevated pain.       See Exercise, Manual, and Modality Logs for complete treatment.     Assessment/Plan  We did not have the brace to try today from [facility].  I hope to be able to try the brace next visit.  We seem to be plataueing with treatment.       Progress per Plan of Care and Progress strengthening /stabilization /functional activity      Visit Diagnoses:    ICD-10-CM ICD-9-CM   1. Spinal stenosis of lumbar region with neurogenic claudication M48.062 724.03   2. Spondylolisthesis of lumbar region M43.16 738.4   3. Bulging of lumbar intervertebral disc M51.26 722.10            Manual Therapy:         mins  91498;  Therapeutic Exercise:    28     mins  85765;     Neuromuscular Cassandra:        mins  70757;    Therapeutic Activity:     11     mins  79313;     Gait Training:        ___  mins  16002;     Ultrasound:          mins  01568;    Electrical Stimulation:         mins  92684 ( );  Dry Needling          mins self-pay    Timed Treatment:   39   mins   Total Treatment:     55   mins    Donaldo Thorpe, PT  Physical Therapist

## 2020-01-10 ENCOUNTER — TREATMENT (OUTPATIENT)
Dept: PHYSICAL THERAPY | Facility: CLINIC | Age: 75
End: 2020-01-10

## 2020-01-10 DIAGNOSIS — M51.36 BULGING OF LUMBAR INTERVERTEBRAL DISC: ICD-10-CM

## 2020-01-10 DIAGNOSIS — M43.16 SPONDYLOLISTHESIS OF LUMBAR REGION: ICD-10-CM

## 2020-01-10 DIAGNOSIS — M48.062 SPINAL STENOSIS OF LUMBAR REGION WITH NEUROGENIC CLAUDICATION: Primary | ICD-10-CM

## 2020-01-10 PROCEDURE — 97530 THERAPEUTIC ACTIVITIES: CPT | Performed by: PHYSICAL THERAPIST

## 2020-01-10 PROCEDURE — 97110 THERAPEUTIC EXERCISES: CPT | Performed by: PHYSICAL THERAPIST

## 2020-01-10 NOTE — PROGRESS NOTES
Physical Therapy Daily Progress Note    Patient Information  John Leyva  1945      Visit # : 18    John Leyva reports 0/10 pain today at rest.  Pt reports he worked all day yesterday and was in pain after activity.        Objective Pt presents to PT today with no distress noted at rest.     Pt had to be reminded on HEP and SKTC in chair for relief for L/S tightness during day's activity.     See Exercise, Manual, and Modality Logs for complete treatment.     Assessment/Plan  Pt needs continued encouragement to perform HEP daily. We are still waiting on R ankle active ankle brace.        Progress per Plan of Care and Progress strengthening /stabilization /functional activity  Try ankle brace when it comes in.     Visit Diagnoses:    ICD-10-CM ICD-9-CM   1. Spinal stenosis of lumbar region with neurogenic claudication M48.062 724.03   2. Spondylolisthesis of lumbar region M43.16 738.4   3. Bulging of lumbar intervertebral disc M51.26 722.10            Manual Therapy:         mins  06585;  Therapeutic Exercise:    26     mins  46992;     Neuromuscular Cassandra:        mins  27918;    Therapeutic Activity:     12     mins  96412;     Gait Training:        ___  mins  98246;     Ultrasound:          mins  05662;    Electrical Stimulation:         mins  05235 ( );  Dry Needling          mins self-pay    Timed Treatment:   38   mins   Total Treatment:     55   mins    Donaldo Thorpe, PT  Physical Therapist

## 2020-01-21 ENCOUNTER — OFFICE VISIT (OUTPATIENT)
Dept: NEUROSURGERY | Facility: CLINIC | Age: 75
End: 2020-01-21

## 2020-01-21 VITALS — HEIGHT: 68 IN | WEIGHT: 240 LBS | BODY MASS INDEX: 36.37 KG/M2

## 2020-01-21 DIAGNOSIS — M48.062 SPINAL STENOSIS OF LUMBAR REGION WITH NEUROGENIC CLAUDICATION: Primary | ICD-10-CM

## 2020-01-21 PROCEDURE — 99213 OFFICE O/P EST LOW 20 MIN: CPT | Performed by: NEUROLOGICAL SURGERY

## 2020-01-21 NOTE — PROGRESS NOTES
Subjective   John Leyva is a 74 y.o. male who presents for follow up of back and leg pain.     The patient is a 74-year-old man from [place] with a history of pain in his back that radiates to his hips and legs and has been present intermittently over the past 4 to 5 years.  For the past several years it principally radiated to the left side, but has shifted to the right side in the past several months.  Physical therapy was started and has had a minimal benefit if any.  Lumbar MRI scan showed severe stenosis at L4-5 with minimal grade 1 spondylolisthesis.  There is also a chronic left-sided disc herniation L5-S1 which might explain his left-sided leg pain.  There is a minor disc bulge at L5-S1 on the right.    The following portions of the patient's history were reviewed, updated as appropriate and approved: allergies, current medications, past family history, past medical history, past social history, past surgical history, review of systems and problem list.     Review of Systems   Constitutional: Negative for activity change, appetite change, chills, diaphoresis, fatigue, fever and unexpected weight change.   HENT: Negative for congestion, dental problem, drooling, ear discharge, ear pain, facial swelling, hearing loss, mouth sores, nosebleeds, postnasal drip, rhinorrhea, sinus pressure, sneezing, sore throat, tinnitus, trouble swallowing and voice change.    Eyes: Negative for photophobia, pain, discharge, redness, itching and visual disturbance.   Respiratory: Negative for apnea, cough, choking, chest tightness, shortness of breath, wheezing and stridor.    Cardiovascular: Negative for chest pain, palpitations and leg swelling.   Gastrointestinal: Negative for abdominal distention, abdominal pain, anal bleeding, blood in stool, constipation, diarrhea, nausea, rectal pain and vomiting.   Endocrine: Negative for cold intolerance, heat intolerance, polydipsia, polyphagia and polyuria.   Genitourinary: Negative  for decreased urine volume, difficulty urinating, dysuria, enuresis, flank pain, frequency, genital sores, hematuria and urgency.   Musculoskeletal: Positive for arthralgias. Negative for back pain, gait problem, joint swelling, myalgias, neck pain and neck stiffness.   Skin: Negative for color change, pallor, rash and wound.   Allergic/Immunologic: Negative for environmental allergies, food allergies and immunocompromised state.   Neurological: Negative for dizziness, tremors, seizures, syncope, facial asymmetry, speech difficulty, weakness, light-headedness, numbness and headaches.   Hematological: Negative for adenopathy. Does not bruise/bleed easily.   Psychiatric/Behavioral: Negative for agitation, behavioral problems, confusion, decreased concentration, dysphoric mood, hallucinations, self-injury, sleep disturbance and suicidal ideas. The patient is not nervous/anxious and is not hyperactive.        Objective    NEUROLOGICAL EXAMINATION:    Neurologically intact with no motor or sensory loss, but experiences neurogenic claudication on standing or walking.    Assessment   Lumbar stenosis severe L4-5, minimal grade 1 spondylolisthesis.  Right-sided moderate disc bulge L5-S1.       Plan   Minimal access lumbar laminectomy L4-5.  Possible discectomy L5-S1 right.       Matthew Lazaro MD

## 2020-01-23 RX ORDER — ALLOPURINOL 100 MG/1
100 TABLET ORAL DAILY
Qty: 30 TABLET | Refills: 1 | Status: SHIPPED | OUTPATIENT
Start: 2020-01-23 | End: 2020-03-16

## 2020-01-23 RX ORDER — ATORVASTATIN CALCIUM 20 MG/1
TABLET, FILM COATED ORAL
Qty: 30 TABLET | Refills: 1 | Status: SHIPPED | OUTPATIENT
Start: 2020-01-23 | End: 2020-03-16

## 2020-01-27 ENCOUNTER — TELEPHONE (OUTPATIENT)
Dept: INTERNAL MEDICINE | Facility: CLINIC | Age: 75
End: 2020-01-27

## 2020-01-27 ENCOUNTER — PREP FOR SURGERY (OUTPATIENT)
Dept: INTERNAL MEDICINE | Facility: CLINIC | Age: 75
End: 2020-01-27

## 2020-01-27 ENCOUNTER — HOSPITAL ENCOUNTER (OUTPATIENT)
Facility: HOSPITAL | Age: 75
Setting detail: SURGERY ADMIT
End: 2020-01-27
Attending: NEUROLOGICAL SURGERY | Admitting: NEUROLOGICAL SURGERY

## 2020-01-27 NOTE — TELEPHONE ENCOUNTER
Received a call from Melanie at [facility] wanting to know if they can discontinue Mobic prior to pt's surgery, returned Melanie's call advising her that it was fine per Dr. Almanza to discontinue this med prior to his surgery.

## 2020-01-28 ENCOUNTER — PREP FOR SURGERY (OUTPATIENT)
Dept: OTHER | Facility: HOSPITAL | Age: 75
End: 2020-01-28

## 2020-01-28 DIAGNOSIS — R93.7 MUSCULOSKELETAL SYSTEM IMAGING ABNORMALITY: ICD-10-CM

## 2020-01-28 DIAGNOSIS — M48.062 SPINAL STENOSIS OF LUMBAR REGION WITH NEUROGENIC CLAUDICATION: Primary | ICD-10-CM

## 2020-01-28 RX ORDER — HYDROCODONE BITARTRATE AND ACETAMINOPHEN 7.5; 325 MG/1; MG/1
1 TABLET ORAL ONCE
Status: CANCELLED | OUTPATIENT
Start: 2020-01-28 | End: 2020-01-28

## 2020-01-28 RX ORDER — ACETAMINOPHEN 325 MG/1
650 TABLET ORAL ONCE
Status: CANCELLED | OUTPATIENT
Start: 2020-01-28 | End: 2020-01-28

## 2020-01-28 RX ORDER — CEFAZOLIN SODIUM 2 G/100ML
2 INJECTION, SOLUTION INTRAVENOUS ONCE
Status: CANCELLED | OUTPATIENT
Start: 2020-01-28 | End: 2020-01-28

## 2020-01-28 RX ORDER — IBUPROFEN 800 MG/1
800 TABLET ORAL ONCE
Status: CANCELLED | OUTPATIENT
Start: 2020-01-28 | End: 2020-01-28

## 2020-02-11 ENCOUNTER — TELEPHONE (OUTPATIENT)
Dept: INTERNAL MEDICINE | Facility: CLINIC | Age: 75
End: 2020-02-11

## 2020-02-11 RX ORDER — MELOXICAM 7.5 MG/1
7.5 TABLET ORAL 2 TIMES DAILY PRN
Qty: 60 TABLET | Refills: 1 | Status: SHIPPED | OUTPATIENT
Start: 2020-02-11 | End: 2020-03-10

## 2020-02-11 NOTE — TELEPHONE ENCOUNTER
Patient's wife requested a refill of meloxicam (MOBIC) 7.5 MG tablet. Patient has a couple of days of medication left.    [facility] on [facility] in [place] confirmed    Please call and advise. Patient's wife call back [phone]

## 2020-02-19 ENCOUNTER — APPOINTMENT (OUTPATIENT)
Dept: PREADMISSION TESTING | Facility: HOSPITAL | Age: 75
End: 2020-02-19

## 2020-02-19 VITALS — HEIGHT: 68 IN | BODY MASS INDEX: 38.49 KG/M2 | WEIGHT: 254 LBS

## 2020-02-19 DIAGNOSIS — R93.7 MUSCULOSKELETAL SYSTEM IMAGING ABNORMALITY: ICD-10-CM

## 2020-02-19 DIAGNOSIS — M48.062 SPINAL STENOSIS OF LUMBAR REGION WITH NEUROGENIC CLAUDICATION: ICD-10-CM

## 2020-02-19 LAB
ANION GAP SERPL CALCULATED.3IONS-SCNC: 10 MMOL/L (ref 5–15)
BUN BLD-MCNC: 15 MG/DL (ref 8–23)
BUN/CREAT SERPL: 15 (ref 7–25)
CALCIUM SPEC-SCNC: 9.5 MG/DL (ref 8.6–10.5)
CHLORIDE SERPL-SCNC: 101 MMOL/L (ref 98–107)
CO2 SERPL-SCNC: 24 MMOL/L (ref 22–29)
CREAT BLD-MCNC: 1 MG/DL (ref 0.76–1.27)
DEPRECATED RDW RBC AUTO: 42.7 FL (ref 37–54)
ERYTHROCYTE [DISTWIDTH] IN BLOOD BY AUTOMATED COUNT: 12.6 % (ref 12.3–15.4)
GFR SERPL CREATININE-BSD FRML MDRD: 73 ML/MIN/1.73
GLUCOSE BLD-MCNC: 137 MG/DL (ref 65–99)
HBA1C MFR BLD: 6.8 % (ref 4.8–5.6)
HCT VFR BLD AUTO: 47.7 % (ref 37.5–51)
HGB BLD-MCNC: 16 G/DL (ref 13–17.7)
MCH RBC QN AUTO: 30.9 PG (ref 26.6–33)
MCHC RBC AUTO-ENTMCNC: 33.5 G/DL (ref 31.5–35.7)
MCV RBC AUTO: 92.3 FL (ref 79–97)
PLATELET # BLD AUTO: 257 10*3/MM3 (ref 140–450)
PMV BLD AUTO: 9.7 FL (ref 6–12)
POTASSIUM BLD-SCNC: 5 MMOL/L (ref 3.5–5.2)
RBC # BLD AUTO: 5.17 10*6/MM3 (ref 4.14–5.8)
SODIUM BLD-SCNC: 135 MMOL/L (ref 136–145)
WBC NRBC COR # BLD: 10.23 10*3/MM3 (ref 3.4–10.8)

## 2020-02-19 PROCEDURE — 93010 ELECTROCARDIOGRAM REPORT: CPT | Performed by: INTERNAL MEDICINE

## 2020-02-19 PROCEDURE — 83036 HEMOGLOBIN GLYCOSYLATED A1C: CPT | Performed by: ANESTHESIOLOGY

## 2020-02-19 PROCEDURE — 93005 ELECTROCARDIOGRAM TRACING: CPT

## 2020-02-19 PROCEDURE — 87081 CULTURE SCREEN ONLY: CPT | Performed by: ANESTHESIOLOGY

## 2020-02-19 PROCEDURE — 85027 COMPLETE CBC AUTOMATED: CPT | Performed by: NEUROLOGICAL SURGERY

## 2020-02-19 PROCEDURE — 80048 BASIC METABOLIC PNL TOTAL CA: CPT | Performed by: NEUROLOGICAL SURGERY

## 2020-02-19 PROCEDURE — 36415 COLL VENOUS BLD VENIPUNCTURE: CPT

## 2020-02-20 LAB — MRSA SPEC QL CULT: NORMAL

## 2020-02-25 ENCOUNTER — OFFICE VISIT (OUTPATIENT)
Dept: NEUROSURGERY | Facility: CLINIC | Age: 75
End: 2020-02-25

## 2020-02-25 VITALS — BODY MASS INDEX: 38.8 KG/M2 | WEIGHT: 256 LBS | HEIGHT: 68 IN | TEMPERATURE: 99.1 F

## 2020-02-25 DIAGNOSIS — M48.062 SPINAL STENOSIS OF LUMBAR REGION WITH NEUROGENIC CLAUDICATION: Primary | ICD-10-CM

## 2020-02-25 DIAGNOSIS — M43.16 SPONDYLOLISTHESIS OF LUMBAR REGION: ICD-10-CM

## 2020-02-25 DIAGNOSIS — M51.26 HERNIATED LUMBAR INTERVERTEBRAL DISC: ICD-10-CM

## 2020-02-25 PROCEDURE — 99213 OFFICE O/P EST LOW 20 MIN: CPT | Performed by: NEUROLOGICAL SURGERY

## 2020-02-25 NOTE — PROGRESS NOTES
Patient: John Leyva  : 1945    Primary Care Provider: Mahesh Almanza MD    Requesting Provider: As above        History    Chief Complaint: Low back and bilateral lower extremity pain with walking and standing intolerance.    History of Present Illness: Mr. Leyva is a 74-year-old retired  who has a 10-year history of difficulties that predominantly involve his legs.  His left leg was afflicted for years but over the last 6 months the pain has switched over to the right leg.  More symptoms are in his thighs.  Symptoms in general are worse with standing and walking.  If he is up too long his back now tightens up on him.  Those symptoms go away with sitting.  He has no bowel or bladder dysfunction.  Currently he has no numbness.  Symptoms are worse with walking and even lying flat on his back at night.  Dr. Lazaro had planned on pursuing lumbar decompression at L4-5 and possibly discectomy on the left at L5-S1.    Review of Systems   Constitutional: Negative for activity change, appetite change, chills, diaphoresis, fatigue, fever and unexpected weight change.   HENT: Negative for congestion, dental problem, drooling, ear discharge, ear pain, facial swelling, hearing loss, mouth sores, nosebleeds, postnasal drip, rhinorrhea, sinus pressure, sneezing, sore throat, tinnitus, trouble swallowing and voice change.    Eyes: Negative for photophobia, pain, discharge, redness, itching and visual disturbance.   Respiratory: Negative for apnea, cough, choking, chest tightness, shortness of breath, wheezing and stridor.    Cardiovascular: Negative for chest pain, palpitations and leg swelling.   Gastrointestinal: Negative for abdominal distention, abdominal pain, anal bleeding, blood in stool, constipation, diarrhea, nausea, rectal pain and vomiting.   Endocrine: Negative for cold intolerance, heat intolerance, polydipsia, polyphagia and polyuria.   Genitourinary: Negative for decreased urine volume, difficulty  "urinating, dysuria, enuresis, flank pain, frequency, genital sores, hematuria and urgency.   Musculoskeletal: Positive for arthralgias. Negative for back pain, gait problem, joint swelling, myalgias, neck pain and neck stiffness.   Skin: Negative for color change, pallor, rash and wound.   Allergic/Immunologic: Negative for environmental allergies, food allergies and immunocompromised state.   Neurological: Negative for dizziness, tremors, seizures, syncope, facial asymmetry, speech difficulty, weakness, light-headedness, numbness and headaches.   Hematological: Negative for adenopathy. Does not bruise/bleed easily.   Psychiatric/Behavioral: Negative for agitation, behavioral problems, confusion, decreased concentration, dysphoric mood, hallucinations, self-injury, sleep disturbance and suicidal ideas. The patient is not nervous/anxious and is not hyperactive.        The patient's past medical history, past surgical history, family history, and social history have been reviewed at length in the electronic medical record.    Physical Exam:   Temp 99.1 °F (37.3 °C) (Temporal)   Ht 172.7 cm (68\")   Wt 116 kg (256 lb)   BMI 38.92 kg/m²   MUSCULOSKELETAL:  Straight leg raising is negative.  Quinton's Sign is mildly positive on the right.  ROM in back is limited in all directions.  Tenderness in the back to palpation is not observed.  NEUROLOGICAL:  Strength is intact in the lower extremities to direct testing.  Muscle tone is normal throughout.  There is loss of bulk in his right calf.  Station and gait are normal.  Sensation is intact to light touch testing throughout.  Deep tendon reflexes are difficult to elicit throughout.  Coordination is intact.      Medical Decision Making    Data Review:   MRI of the lumbar spine dated 9/11/2019 demonstrates multilevel degenerative disc disease.  There is diffuse facet arthropathy.  There is a grade 1 listhesis of L4 on L5 with high-grade stenosis.  There is some disc " protrusion more leftward at L5-S1.  There is at least moderate stenosis at L3-4.    Diagnosis:   1.  Lumbar stenosis with neurogenic claudication.  2.  Mechanical low back pain.  3.  Potential lumbar instability.    Treatment Options:   For completeness I am going to set up a lumbar CT myelogram for surgical planning purposes.  Based on that I would like to make a decision regarding which levels need to be addressed and whether stabilization and fusion are necessary.       Diagnosis Plan   1. Spinal stenosis of lumbar region with neurogenic claudication     2. Spondylolisthesis of lumbar region     3. Herniated lumbar intervertebral disc         Scribed for Paulie Daniels MD by Stefanie Jaquez CMA on 02/25/2020 at 11:04 AM      I, Dr. Daniels, personally performed the services described in the documentation, as scribed in my presence, and it is both accurate and complete.

## 2020-03-10 ENCOUNTER — HOSPITAL ENCOUNTER (OUTPATIENT)
Dept: CT IMAGING | Facility: HOSPITAL | Age: 75
Discharge: HOME | End: 2020-03-10

## 2020-03-10 ENCOUNTER — HOSPITAL ENCOUNTER (OUTPATIENT)
Dept: GENERAL RADIOLOGY | Facility: HOSPITAL | Age: 75
Discharge: HOME | End: 2020-03-10

## 2020-03-10 ENCOUNTER — HOSPITAL ENCOUNTER (OUTPATIENT)
Dept: GENERAL RADIOLOGY | Facility: HOSPITAL | Age: 75
Discharge: HOME | End: 2020-03-10
Admitting: NEUROLOGICAL SURGERY

## 2020-03-10 VITALS
TEMPERATURE: 97.6 F | DIASTOLIC BLOOD PRESSURE: 69 MMHG | RESPIRATION RATE: 18 BRPM | SYSTOLIC BLOOD PRESSURE: 133 MMHG | HEIGHT: 68 IN | WEIGHT: 254.8 LBS | HEART RATE: 57 BPM | BODY MASS INDEX: 38.62 KG/M2

## 2020-03-10 DIAGNOSIS — M48.062 SPINAL STENOSIS OF LUMBAR REGION WITH NEUROGENIC CLAUDICATION: ICD-10-CM

## 2020-03-10 PROCEDURE — 72132 CT LUMBAR SPINE W/DYE: CPT

## 2020-03-10 PROCEDURE — 0 IOPAMIDOL 41 % SOLUTION: Performed by: NEUROLOGICAL SURGERY

## 2020-03-10 PROCEDURE — 62304 MYELOGRAPHY LUMBAR INJECTION: CPT

## 2020-03-10 PROCEDURE — 72120 X-RAY BEND ONLY L-S SPINE: CPT

## 2020-03-10 PROCEDURE — 72240 MYELOGRAPHY NECK SPINE: CPT

## 2020-03-10 RX ORDER — LIDOCAINE HYDROCHLORIDE 10 MG/ML
5 INJECTION, SOLUTION EPIDURAL; INFILTRATION; INTRACAUDAL; PERINEURAL ONCE
Status: COMPLETED | OUTPATIENT
Start: 2020-03-10 | End: 2020-03-10

## 2020-03-10 RX ORDER — ONDANSETRON 4 MG/1
4 TABLET, FILM COATED ORAL ONCE AS NEEDED
Status: DISCONTINUED | OUTPATIENT
Start: 2020-03-10 | End: 2020-03-11 | Stop reason: HOSPADM

## 2020-03-10 RX ORDER — PROMETHAZINE HYDROCHLORIDE 25 MG/ML
25 INJECTION, SOLUTION INTRAMUSCULAR; INTRAVENOUS ONCE AS NEEDED
Status: DISCONTINUED | OUTPATIENT
Start: 2020-03-10 | End: 2020-03-11 | Stop reason: HOSPADM

## 2020-03-10 RX ORDER — PROMETHAZINE HYDROCHLORIDE 25 MG/1
25 TABLET ORAL ONCE AS NEEDED
Status: DISCONTINUED | OUTPATIENT
Start: 2020-03-10 | End: 2020-03-11 | Stop reason: HOSPADM

## 2020-03-10 RX ADMIN — LIDOCAINE HYDROCHLORIDE 5 ML: 10 INJECTION, SOLUTION EPIDURAL; INFILTRATION; INTRACAUDAL; PERINEURAL at 07:05

## 2020-03-10 RX ADMIN — IOPAMIDOL 20 ML: 408 INJECTION, SOLUTION INTRATHECAL at 07:08

## 2020-03-10 NOTE — NURSING NOTE
Tolerated procedure well. Tolerating oral intake and light activity in room. Given printed and oral discharge instructions. Verbalizes understanding. Discharged per wheelchair to front entrance with family driving.

## 2020-03-10 NOTE — POST-PROCEDURE NOTE
MYELOGRAM PROCEDURE NOTE  Neurosurgery    Patient: John Leyva  : 1945      PreOp Diagnosis: Lumbar stenosis    PostOp Diagnosis: Same    Procedure: Lumbar myelogram    Surgeon: Jeremiah    Anesthesia: 1% lidocaine    Technique:   Spinal needle: 22 gauge   Contrast: Isovue 200 (20ml)   Injection site: R L3    EBL: Trace    Specimens removed: None    Complication: None        Paulie Daniels MD  03/10/20  07:12

## 2020-03-11 ENCOUNTER — TELEPHONE (OUTPATIENT)
Dept: INFUSION THERAPY | Facility: HOSPITAL | Age: 75
End: 2020-03-11

## 2020-03-11 ENCOUNTER — OFFICE VISIT (OUTPATIENT)
Dept: NEUROSURGERY | Facility: CLINIC | Age: 75
End: 2020-03-11

## 2020-03-11 ENCOUNTER — PREP FOR SURGERY (OUTPATIENT)
Dept: OTHER | Facility: HOSPITAL | Age: 75
End: 2020-03-11

## 2020-03-11 VITALS — WEIGHT: 254 LBS | BODY MASS INDEX: 38.49 KG/M2 | TEMPERATURE: 98.3 F | HEIGHT: 68 IN

## 2020-03-11 DIAGNOSIS — M43.16 SPONDYLOLISTHESIS OF LUMBAR REGION: ICD-10-CM

## 2020-03-11 DIAGNOSIS — M48.062 LUMBAR STENOSIS WITH NEUROGENIC CLAUDICATION: Primary | ICD-10-CM

## 2020-03-11 DIAGNOSIS — M48.062 SPINAL STENOSIS OF LUMBAR REGION WITH NEUROGENIC CLAUDICATION: Primary | ICD-10-CM

## 2020-03-11 DIAGNOSIS — M51.26 HERNIATED LUMBAR INTERVERTEBRAL DISC: ICD-10-CM

## 2020-03-11 PROCEDURE — 99213 OFFICE O/P EST LOW 20 MIN: CPT | Performed by: NEUROLOGICAL SURGERY

## 2020-03-11 RX ORDER — OXYCODONE HCL 10 MG/1
10 TABLET, FILM COATED, EXTENDED RELEASE ORAL ONCE
Status: CANCELLED | OUTPATIENT
Start: 2020-03-11 | End: 2020-03-11

## 2020-03-11 RX ORDER — FAMOTIDINE 20 MG/1
20 TABLET, FILM COATED ORAL
Status: CANCELLED | OUTPATIENT
Start: 2020-03-11

## 2020-03-11 RX ORDER — ACETAMINOPHEN 500 MG
1000 TABLET ORAL ONCE
Status: CANCELLED | OUTPATIENT
Start: 2020-03-11 | End: 2020-03-11

## 2020-03-11 RX ORDER — IBUPROFEN 800 MG/1
800 TABLET ORAL ONCE
Status: CANCELLED | OUTPATIENT
Start: 2020-03-11 | End: 2020-03-11

## 2020-03-11 RX ORDER — CEFAZOLIN SODIUM 2 G/100ML
2 INJECTION, SOLUTION INTRAVENOUS ONCE
Status: CANCELLED | OUTPATIENT
Start: 2020-03-11 | End: 2020-03-11

## 2020-03-11 NOTE — H&P
Patient: John Leyva  : 1945     Primary Care Provider: Mahesh Almanza MD     Requesting Provider: As above           History     Chief Complaint: Low back and bilateral lower extremity pain with walking and standing intolerance.     History of Present Illness: Mr. Leyva is a 74-year-old retired  who has a 10-year history of difficulties that predominantly involve his legs.  His left leg was afflicted for years but over the last 6 months the pain has switched over to the right thigh.  More symptoms are in his thighs.  Symptoms in general are worse with standing and walking.  If he is up too long his back now tightens up on him.  Those symptoms go away with sitting.  He has no bowel or bladder dysfunction.  Currently he has no numbness.  Symptoms are worse with walking and even lying flat on his back at night.  Dr. Lazaro had planned on pursuing lumbar decompression at L4-5 and possibly discectomy on the left at L5-S1.  Yesterday he underwent lumbar CT myelography.     Review of Systems   Constitutional: Negative for activity change, appetite change, chills, diaphoresis, fatigue, fever and unexpected weight change.   HENT: Negative for congestion, dental problem, drooling, ear discharge, ear pain, facial swelling, hearing loss, mouth sores, nosebleeds, postnasal drip, rhinorrhea, sinus pressure, sneezing, sore throat, tinnitus, trouble swallowing and voice change.    Eyes: Negative for photophobia, pain, discharge, redness, itching and visual disturbance.   Respiratory: Negative for apnea, cough, choking, chest tightness, shortness of breath, wheezing and stridor.    Cardiovascular: Negative for chest pain, palpitations and leg swelling.   Gastrointestinal: Negative for abdominal distention, abdominal pain, anal bleeding, blood in stool, constipation, diarrhea, nausea, rectal pain and vomiting.   Endocrine: Negative for cold intolerance, heat intolerance, polydipsia, polyphagia and polyuria.    Genitourinary: Negative for decreased urine volume, difficulty urinating, dysuria, enuresis, flank pain, frequency, genital sores, hematuria and urgency.   Musculoskeletal: Positive for arthralgias. Negative for back pain, gait problem, joint swelling, myalgias, neck pain and neck stiffness.   Skin: Negative for color change, pallor, rash and wound.   Allergic/Immunologic: Negative for environmental allergies, food allergies and immunocompromised state.   Neurological: Negative for dizziness, tremors, seizures, syncope, facial asymmetry, speech difficulty, weakness, light-headedness, numbness and headaches.   Hematological: Negative for adenopathy. Does not bruise/bleed easily.   Psychiatric/Behavioral: Negative for agitation, behavioral problems, confusion, decreased concentration, dysphoric mood, hallucinations, self-injury, sleep disturbance and suicidal ideas. The patient is not nervous/anxious and is not hyperactive.          The patient's past medical history, past surgical history, family history, and social history have been reviewed at length in the electronic medical record.     Past Medical History:   Diagnosis Date   • BPH (benign prostatic hyperplasia)    • Diabetes mellitus (CMS/HCC)     no medications    • ED (erectile dysfunction)    • Elevated PSA    • Gout    • Hypertension    • Hypogonadism in male    • Hypothyroidism     no medications   • Low kidney function    • Wears glasses      Past Surgical History:   Procedure Laterality Date   • COLONOSCOPY     • ELBOW OLECRANNON BURSA EXCISION Right    • EYE SURGERY Bilateral     cataracts removed   • RETINAL DETACHMENT SURGERY      x3 surgeries     Family History   Problem Relation Age of Onset   • Cancer Other    • Cancer Mother    • Cancer Father         Bone cancer     Social History     Socioeconomic History   • Marital status:      Spouse name: Yolanda   • Number of children: Not on file   • Years of education: Not on file   • Highest  education level: Not on file   Occupational History     Employer: RETIRED   Tobacco Use   • Smoking status: Former Smoker     Last attempt to quit: 1981     Years since quittin.2   • Smokeless tobacco: Never Used   Substance and Sexual Activity   • Alcohol use: No   • Drug use: No   • Sexual activity: Defer   Social History Narrative    L[facility] in [facility] wife       No Known Allergies    Current Outpatient Medications on File Prior to Visit   Medication Sig Dispense Refill   • allopurinol (ZYLOPRIM) 100 MG tablet Take 1 tablet by mouth Daily. 30 tablet 1   • amLODIPine (NORVASC) 5 MG tablet Take 1 tablet by mouth Daily. 30 tablet 3   • atorvastatin (LIPITOR) 20 MG tablet TAKE ONE TABLET BY MOUTH EVERY DAY (Patient taking differently: Take 20 mg by mouth Daily.) 30 tablet 1   • Chlorhexidine Gluconate 4 % solution Shower each day with solution for 5 days ,beginning 5 days before surgery 237 mL 0   • doxazosin (CARDURA) 2 MG tablet Take 1 tablet by mouth Every Night. 90 tablet 3   • DULoxetine (CYMBALTA) 30 MG capsule Take 1 capsule by mouth Daily. 90 capsule 3   • finasteride (PROSCAR) 5 MG tablet Take 1 tablet by mouth Daily. 90 tablet 3   • lisinopril-hydrochlorothiazide (PRINZIDE,ZESTORETIC) 10-12.5 MG per tablet Take 1 tablet by mouth Daily. 90 tablet 3   • metoprolol succinate XL (TOPROL-XL) 100 MG 24 hr tablet Take 1 tablet by mouth Daily. 90 tablet 3   • Multiple Vitamins-Minerals (MULTIVITAMIN ADULT PO) Take  by mouth Daily.     • mupirocin (BACTROBAN) 2 % ointment Apply to the inside of each nostril with a cotton swab twice daily morning and evening for 5 days before surgery 22 g 0     Current Facility-Administered Medications on File Prior to Visit   Medication Dose Route Frequency Provider Last Rate Last Dose   • [DISCONTINUED] ondansetron (ZOFRAN) tablet 4 mg  4 mg Oral Once PRN Paulie Daniels MD       • [DISCONTINUED] promethazine (PHENERGAN) injection 25 mg  25 mg Intravenous Once PRN  "Paulie Daniels MD       • [DISCONTINUED] promethazine (PHENERGAN) tablet 25 mg  25 mg Oral Once PRN Paulie Daniels MD            Physical Exam:   Temp 98.3 °F (36.8 °C) (Temporal)   Ht 172.7 cm (68\")   Wt 115 kg (254 lb)   BMI 38.62 kg/m²   MUSCULOSKELETAL:  Straight leg raising is negative.  Quinton's Sign is negative.  Tenderness in the back to palpation is not observed.  NEUROLOGICAL:  Strength is intact in the lower extremities to direct testing.  Muscle tone is normal throughout.  Station and gait are normal.  Sensation is intact to light touch testing throughout.     Medical Decision Making     Data Review:   CT myelogram demonstrates a complete block at L4-5.  I do not see aaron movement at the spondylolisthesis at L4-5 which is low-grade.  There is disc base collapse at L5-S1.  There is a fair sized left L5-S1 disc herniation.  There is also a filling defect on the right at L3-4.     MRI of the lumbar spine dated 9/11/2019 demonstrates multilevel degenerative disc disease.  There is diffuse facet arthropathy.  There is a grade 1 listhesis of L4 on L5 with high-grade stenosis.  There is some disc protrusion more leftward at L5-S1.  There is at least moderate stenosis at L3-4.     Diagnosis:   1.  Lumbar stenosis with neurogenic claudication.  2.  Mechanical low back pain.  3.  L4-5 spondylolisthesis without overt instability.     Treatment Options:   I have recommended L4-5 laminectomy with at least hemilaminectomy on the right at L3-4.  I am going to leave the disc protrusion at L5-S1 alone.  The nature of the procedure as well as the potential risks, complications, limitations, and alternatives to the procedure were discussed at length with the patient and the patient has agreed to proceed with surgery.          Diagnosis Plan   1. Spinal stenosis of lumbar region with neurogenic claudication      2. Spondylolisthesis of lumbar region      3. Herniated lumbar intervertebral disc           "

## 2020-03-11 NOTE — PROGRESS NOTES
Patient: John Leyva  : 1945    Primary Care Provider: Mahesh Almanza MD    Requesting Provider: As above        History    Chief Complaint: Low back and bilateral lower extremity pain with walking and standing intolerance.    History of Present Illness: Mr. Leyva is a 74-year-old retired  who has a 10-year history of difficulties that predominantly involve his legs.  His left leg was afflicted for years but over the last 6 months the pain has switched over to the right thigh.  More symptoms are in his thighs.  Symptoms in general are worse with standing and walking.  If he is up too long his back now tightens up on him.  Those symptoms go away with sitting.  He has no bowel or bladder dysfunction.  Currently he has no numbness.  Symptoms are worse with walking and even lying flat on his back at night.  Dr. Lazaro had planned on pursuing lumbar decompression at L4-5 and possibly discectomy on the left at L5-S1.  Yesterday he underwent lumbar CT myelography.    Review of Systems   Constitutional: Negative for activity change, appetite change, chills, diaphoresis, fatigue, fever and unexpected weight change.   HENT: Negative for congestion, dental problem, drooling, ear discharge, ear pain, facial swelling, hearing loss, mouth sores, nosebleeds, postnasal drip, rhinorrhea, sinus pressure, sneezing, sore throat, tinnitus, trouble swallowing and voice change.    Eyes: Negative for photophobia, pain, discharge, redness, itching and visual disturbance.   Respiratory: Negative for apnea, cough, choking, chest tightness, shortness of breath, wheezing and stridor.    Cardiovascular: Negative for chest pain, palpitations and leg swelling.   Gastrointestinal: Negative for abdominal distention, abdominal pain, anal bleeding, blood in stool, constipation, diarrhea, nausea, rectal pain and vomiting.   Endocrine: Negative for cold intolerance, heat intolerance, polydipsia, polyphagia and polyuria.  "  Genitourinary: Negative for decreased urine volume, difficulty urinating, dysuria, enuresis, flank pain, frequency, genital sores, hematuria and urgency.   Musculoskeletal: Positive for arthralgias. Negative for back pain, gait problem, joint swelling, myalgias, neck pain and neck stiffness.   Skin: Negative for color change, pallor, rash and wound.   Allergic/Immunologic: Negative for environmental allergies, food allergies and immunocompromised state.   Neurological: Negative for dizziness, tremors, seizures, syncope, facial asymmetry, speech difficulty, weakness, light-headedness, numbness and headaches.   Hematological: Negative for adenopathy. Does not bruise/bleed easily.   Psychiatric/Behavioral: Negative for agitation, behavioral problems, confusion, decreased concentration, dysphoric mood, hallucinations, self-injury, sleep disturbance and suicidal ideas. The patient is not nervous/anxious and is not hyperactive.        The patient's past medical history, past surgical history, family history, and social history have been reviewed at length in the electronic medical record.    Physical Exam:   Temp 98.3 °F (36.8 °C) (Temporal)   Ht 172.7 cm (68\")   Wt 115 kg (254 lb)   BMI 38.62 kg/m²   MUSCULOSKELETAL:  Straight leg raising is negative.  Quinton's Sign is negative.  Tenderness in the back to palpation is not observed.  NEUROLOGICAL:  Strength is intact in the lower extremities to direct testing.  Muscle tone is normal throughout.  Station and gait are normal.  Sensation is intact to light touch testing throughout.    Medical Decision Making    Data Review:   CT myelogram demonstrates a complete block at L4-5.  I do not see aaron movement at the spondylolisthesis at L4-5 which is low-grade.  There is disc base collapse at L5-S1.  There is a fair sized left L5-S1 disc herniation.  There is also a filling defect on the right at L3-4.    MRI of the lumbar spine dated 9/11/2019 demonstrates multilevel " degenerative disc disease.  There is diffuse facet arthropathy.  There is a grade 1 listhesis of L4 on L5 with high-grade stenosis.  There is some disc protrusion more leftward at L5-S1.  There is at least moderate stenosis at L3-4.    Diagnosis:   1.  Lumbar stenosis with neurogenic claudication.  2.  Mechanical low back pain.  3.  L4-5 spondylolisthesis without overt instability.    Treatment Options:   I have recommended L4-5 laminectomy with at least hemilaminectomy on the right at L3-4.  I am going to leave the disc protrusion at L5-S1 alone.  The nature of the procedure as well as the potential risks, complications, limitations, and alternatives to the procedure were discussed at length with the patient and the patient has agreed to proceed with surgery.       Diagnosis Plan   1. Spinal stenosis of lumbar region with neurogenic claudication     2. Spondylolisthesis of lumbar region     3. Herniated lumbar intervertebral disc         Scribed for Paulie Daniels MD by Stefanie Jaquez CMA on 03/11/2020 at 8:21 AM      I, Dr. Daniels, personally performed the services described in the documentation, as scribed in my presence, and it is both accurate and complete.

## 2020-03-16 RX ORDER — ALLOPURINOL 100 MG/1
TABLET ORAL
Qty: 30 TABLET | Refills: 1 | Status: SHIPPED | OUTPATIENT
Start: 2020-03-16 | End: 2020-03-17 | Stop reason: SDUPTHER

## 2020-03-16 RX ORDER — ATORVASTATIN CALCIUM 20 MG/1
TABLET, FILM COATED ORAL
Qty: 30 TABLET | Refills: 1 | Status: SHIPPED | OUTPATIENT
Start: 2020-03-16 | End: 2020-05-18

## 2020-03-17 ENCOUNTER — TELEPHONE (OUTPATIENT)
Dept: INTERNAL MEDICINE | Facility: CLINIC | Age: 75
End: 2020-03-17

## 2020-03-17 RX ORDER — ALLOPURINOL 100 MG/1
100 TABLET ORAL DAILY
Qty: 30 TABLET | Refills: 2 | Status: SHIPPED | OUTPATIENT
Start: 2020-03-17 | End: 2020-05-18

## 2020-03-17 NOTE — TELEPHONE ENCOUNTER
PT REQUESTING REFILLS ON ALLOPURINOL 100 MG    [facility]

## 2020-04-02 ENCOUNTER — TELEPHONE (OUTPATIENT)
Dept: INTERNAL MEDICINE | Facility: CLINIC | Age: 75
End: 2020-04-02

## 2020-04-02 NOTE — TELEPHONE ENCOUNTER
Left  for pt in regards to his apt to reschedule his appointment tomorrow.     Hub please relay message to see if we can change his visit into a MyChart Video Visit.

## 2020-04-02 NOTE — TELEPHONE ENCOUNTER
PATIENTS WIFE IS RETURNING MAYI'S PHONE CALL. SHE ASKED TO BE CALLED BACK ON THE PHONE NUMBER [phone].

## 2020-04-07 ENCOUNTER — OFFICE VISIT (OUTPATIENT)
Dept: INTERNAL MEDICINE | Facility: CLINIC | Age: 75
End: 2020-04-07

## 2020-04-07 DIAGNOSIS — M10.00 IDIOPATHIC GOUT, UNSPECIFIED CHRONICITY, UNSPECIFIED SITE: ICD-10-CM

## 2020-04-07 DIAGNOSIS — E03.9 HYPOTHYROIDISM, UNSPECIFIED TYPE: ICD-10-CM

## 2020-04-07 DIAGNOSIS — I10 ESSENTIAL HYPERTENSION: ICD-10-CM

## 2020-04-07 DIAGNOSIS — E11.9 TYPE 2 DIABETES MELLITUS WITHOUT COMPLICATION, WITHOUT LONG-TERM CURRENT USE OF INSULIN (HCC): ICD-10-CM

## 2020-04-07 DIAGNOSIS — N40.0 BENIGN NON-NODULAR PROSTATIC HYPERPLASIA: ICD-10-CM

## 2020-04-07 DIAGNOSIS — E78.5 HYPERLIPIDEMIA, UNSPECIFIED HYPERLIPIDEMIA TYPE: Primary | ICD-10-CM

## 2020-04-07 DIAGNOSIS — E55.9 VITAMIN D DEFICIENCY: ICD-10-CM

## 2020-04-07 DIAGNOSIS — M48.062 SPINAL STENOSIS OF LUMBAR REGION WITH NEUROGENIC CLAUDICATION: ICD-10-CM

## 2020-04-07 PROBLEM — R53.83 OTHER FATIGUE: Status: RESOLVED | Noted: 2019-08-21 | Resolved: 2020-04-07

## 2020-04-07 PROCEDURE — 99212 OFFICE O/P EST SF 10 MIN: CPT | Performed by: INTERNAL MEDICINE

## 2020-04-07 RX ORDER — AMLODIPINE BESYLATE 5 MG/1
5 TABLET ORAL DAILY
Qty: 90 TABLET | Refills: 3 | Status: SHIPPED | OUTPATIENT
Start: 2020-04-07 | End: 2020-04-20

## 2020-04-07 NOTE — PROGRESS NOTES
You have chosen to receive care through a telephone visit today. Do you consent to use a telephone visit for your medical care today? Yes    Telephone visit discussed with  Margarito Dia Yin MD Larry York     Patient here for follow-up.  Hypertension stable at home.  Hyperlipidemia stable on medication need a blood test again.  The diabetes is recent blood test that showed A1c 6.8.  Hypothyroidism stable on medication.  Gout is stable.  BPH is stable patient is seeing urologist the patient also has abdominal hernia stable now.  Patient has lumbar radiculopathy surgery is still pending.  Also colonoscopy is pending.      Review of systems patient denies any fever chills coughing sinus problem denies any chest pain any short of breath no abdominal pain.  Patient does have lower back pain.  Also has a radiation lumbar radiculopathy.    Lab tests are reviewed uric acid 7.5 and A1c 6.8 other labs are acceptable.    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 Sutherland         Still in need of colonoscopy report done 3 years from [facility],need repeat.  pt states the report says it was clear     Flu vaccine- denied     Follow-up in 3 months patient is to schedule    This visit has been rescheduled as a phone visit to comply with patient safety concerns in accordance with CDC recommendations. Total time of discussion was 11 minutes.

## 2020-04-20 RX ORDER — AMLODIPINE BESYLATE 5 MG/1
5 TABLET ORAL DAILY
Qty: 30 TABLET | Refills: 3 | Status: SHIPPED | OUTPATIENT
Start: 2020-04-20 | End: 2021-05-14

## 2020-05-15 PROBLEM — M48.062 LUMBAR STENOSIS WITH NEUROGENIC CLAUDICATION: Status: ACTIVE | Noted: 2020-03-11

## 2020-05-18 ENCOUNTER — PREP FOR SURGERY (OUTPATIENT)
Dept: OTHER | Facility: HOSPITAL | Age: 75
End: 2020-05-18

## 2020-05-18 RX ORDER — ATORVASTATIN CALCIUM 20 MG/1
TABLET, FILM COATED ORAL
Qty: 30 TABLET | Refills: 1 | Status: SHIPPED | OUTPATIENT
Start: 2020-05-18 | End: 2020-07-15

## 2020-05-18 RX ORDER — ALLOPURINOL 100 MG/1
TABLET ORAL
Qty: 30 TABLET | Refills: 1 | Status: SHIPPED | OUTPATIENT
Start: 2020-05-18 | End: 2020-08-17

## 2020-06-01 ENCOUNTER — APPOINTMENT (OUTPATIENT)
Dept: PREADMISSION TESTING | Facility: HOSPITAL | Age: 75
End: 2020-06-01

## 2020-06-01 VITALS — WEIGHT: 245 LBS | HEIGHT: 68 IN | BODY MASS INDEX: 37.13 KG/M2

## 2020-06-01 LAB
ANION GAP SERPL CALCULATED.3IONS-SCNC: 14 MMOL/L (ref 5–15)
BASOPHILS # BLD AUTO: 0.03 10*3/MM3 (ref 0–0.2)
BASOPHILS NFR BLD AUTO: 0.3 % (ref 0–1.5)
BUN BLD-MCNC: 16 MG/DL (ref 8–23)
BUN/CREAT SERPL: 15.7 (ref 7–25)
CALCIUM SPEC-SCNC: 9.5 MG/DL (ref 8.6–10.5)
CHLORIDE SERPL-SCNC: 101 MMOL/L (ref 98–107)
CO2 SERPL-SCNC: 24 MMOL/L (ref 22–29)
CREAT BLD-MCNC: 1.02 MG/DL (ref 0.76–1.27)
DEPRECATED RDW RBC AUTO: 45.8 FL (ref 37–54)
EOSINOPHIL # BLD AUTO: 0.2 10*3/MM3 (ref 0–0.4)
EOSINOPHIL NFR BLD AUTO: 2.3 % (ref 0.3–6.2)
ERYTHROCYTE [DISTWIDTH] IN BLOOD BY AUTOMATED COUNT: 13.2 % (ref 12.3–15.4)
GFR SERPL CREATININE-BSD FRML MDRD: 72 ML/MIN/1.73
GLUCOSE BLD-MCNC: 111 MG/DL (ref 65–99)
HBA1C MFR BLD: 6.8 % (ref 4.8–5.6)
HCT VFR BLD AUTO: 46.1 % (ref 37.5–51)
HGB BLD-MCNC: 15.3 G/DL (ref 13–17.7)
IMM GRANULOCYTES # BLD AUTO: 0.04 10*3/MM3 (ref 0–0.05)
IMM GRANULOCYTES NFR BLD AUTO: 0.5 % (ref 0–0.5)
LYMPHOCYTES # BLD AUTO: 2.91 10*3/MM3 (ref 0.7–3.1)
LYMPHOCYTES NFR BLD AUTO: 33.4 % (ref 19.6–45.3)
MCH RBC QN AUTO: 31.4 PG (ref 26.6–33)
MCHC RBC AUTO-ENTMCNC: 33.2 G/DL (ref 31.5–35.7)
MCV RBC AUTO: 94.7 FL (ref 79–97)
MONOCYTES # BLD AUTO: 0.6 10*3/MM3 (ref 0.1–0.9)
MONOCYTES NFR BLD AUTO: 6.9 % (ref 5–12)
NEUTROPHILS # BLD AUTO: 4.94 10*3/MM3 (ref 1.7–7)
NEUTROPHILS NFR BLD AUTO: 56.6 % (ref 42.7–76)
NRBC BLD AUTO-RTO: 0 /100 WBC (ref 0–0.2)
PLATELET # BLD AUTO: 250 10*3/MM3 (ref 140–450)
PMV BLD AUTO: 9.6 FL (ref 6–12)
POTASSIUM BLD-SCNC: 4.7 MMOL/L (ref 3.5–5.2)
RBC # BLD AUTO: 4.87 10*6/MM3 (ref 4.14–5.8)
SODIUM BLD-SCNC: 139 MMOL/L (ref 136–145)
WBC NRBC COR # BLD: 8.72 10*3/MM3 (ref 3.4–10.8)

## 2020-06-01 PROCEDURE — 93010 ELECTROCARDIOGRAM REPORT: CPT | Performed by: INTERNAL MEDICINE

## 2020-06-01 PROCEDURE — 93005 ELECTROCARDIOGRAM TRACING: CPT

## 2020-06-01 PROCEDURE — C9803 HOPD COVID-19 SPEC COLLECT: HCPCS

## 2020-06-01 PROCEDURE — 85025 COMPLETE CBC W/AUTO DIFF WBC: CPT | Performed by: PHYSICIAN ASSISTANT

## 2020-06-01 PROCEDURE — U0004 COV-19 TEST NON-CDC HGH THRU: HCPCS

## 2020-06-01 PROCEDURE — 87081 CULTURE SCREEN ONLY: CPT | Performed by: PHYSICIAN ASSISTANT

## 2020-06-01 PROCEDURE — 80048 BASIC METABOLIC PNL TOTAL CA: CPT | Performed by: PHYSICIAN ASSISTANT

## 2020-06-01 PROCEDURE — 83036 HEMOGLOBIN GLYCOSYLATED A1C: CPT | Performed by: PHYSICIAN ASSISTANT

## 2020-06-01 PROCEDURE — U0002 COVID-19 LAB TEST NON-CDC: HCPCS

## 2020-06-01 NOTE — PAT

## 2020-06-02 LAB
MRSA SPEC QL CULT: NORMAL
REF LAB TEST METHOD: NORMAL
SARS-COV-2 RNA RESP QL NAA+PROBE: NOT DETECTED

## 2020-06-03 ENCOUNTER — ANESTHESIA EVENT (OUTPATIENT)
Dept: PERIOP | Facility: HOSPITAL | Age: 75
End: 2020-06-03

## 2020-06-03 RX ORDER — FAMOTIDINE 10 MG/ML
20 INJECTION, SOLUTION INTRAVENOUS ONCE
Status: CANCELLED | OUTPATIENT
Start: 2020-06-03 | End: 2020-06-03

## 2020-06-04 ENCOUNTER — APPOINTMENT (OUTPATIENT)
Dept: GENERAL RADIOLOGY | Facility: HOSPITAL | Age: 75
End: 2020-06-04

## 2020-06-04 ENCOUNTER — ANESTHESIA (OUTPATIENT)
Dept: PERIOP | Facility: HOSPITAL | Age: 75
End: 2020-06-04

## 2020-06-04 ENCOUNTER — HOSPITAL ENCOUNTER (OUTPATIENT)
Facility: HOSPITAL | Age: 75
Discharge: HOME | End: 2020-06-06
Attending: NEUROLOGICAL SURGERY | Admitting: NEUROLOGICAL SURGERY

## 2020-06-04 DIAGNOSIS — M48.062 LUMBAR STENOSIS WITH NEUROGENIC CLAUDICATION: ICD-10-CM

## 2020-06-04 LAB
GLUCOSE BLDC GLUCOMTR-MCNC: 113 MG/DL (ref 70–130)
GLUCOSE BLDC GLUCOMTR-MCNC: 135 MG/DL (ref 70–130)

## 2020-06-04 PROCEDURE — 25010000003 CEFAZOLIN IN DEXTROSE 2-4 GM/100ML-% SOLUTION: Performed by: NEUROLOGICAL SURGERY

## 2020-06-04 PROCEDURE — 63710000001 TERAZOSIN 1 MG CAPSULE: Performed by: NEUROLOGICAL SURGERY

## 2020-06-04 PROCEDURE — A9270 NON-COVERED ITEM OR SERVICE: HCPCS | Performed by: NEUROLOGICAL SURGERY

## 2020-06-04 PROCEDURE — 63710000001 ATORVASTATIN 20 MG TABLET: Performed by: NEUROLOGICAL SURGERY

## 2020-06-04 PROCEDURE — 25010000002 PROPOFOL 10 MG/ML EMULSION: Performed by: NURSE ANESTHETIST, CERTIFIED REGISTERED

## 2020-06-04 PROCEDURE — 63710000001 IBUPROFEN 600 MG TABLET: Performed by: NEUROLOGICAL SURGERY

## 2020-06-04 PROCEDURE — 76000 FLUOROSCOPY <1 HR PHYS/QHP: CPT

## 2020-06-04 PROCEDURE — 63710000001 IBUPROFEN 800 MG TABLET: Performed by: NEUROLOGICAL SURGERY

## 2020-06-04 PROCEDURE — 63710000001 OXYCODONE 10 MG TABLET EXTENDED-RELEASE 12 HOUR: Performed by: NEUROLOGICAL SURGERY

## 2020-06-04 PROCEDURE — 82962 GLUCOSE BLOOD TEST: CPT

## 2020-06-04 PROCEDURE — 63048 LAM FACETEC &FORAMOT EA ADDL: CPT | Performed by: NEUROLOGICAL SURGERY

## 2020-06-04 PROCEDURE — 63047 LAM FACETEC & FORAMOT LUMBAR: CPT | Performed by: NEUROLOGICAL SURGERY

## 2020-06-04 PROCEDURE — 63710000001 DULOXETINE 30 MG CAPSULE DELAYED-RELEASE PARTICLES: Performed by: NEUROLOGICAL SURGERY

## 2020-06-04 PROCEDURE — S0260 H&P FOR SURGERY: HCPCS | Performed by: NURSE PRACTITIONER

## 2020-06-04 PROCEDURE — 25010000002 FENTANYL CITRATE (PF) 100 MCG/2ML SOLUTION: Performed by: NURSE ANESTHETIST, CERTIFIED REGISTERED

## 2020-06-04 PROCEDURE — 63048 LAM FACETEC &FORAMOT EA ADDL: CPT | Performed by: PHYSICIAN ASSISTANT

## 2020-06-04 PROCEDURE — 25010000002 ONDANSETRON PER 1 MG: Performed by: NURSE ANESTHETIST, CERTIFIED REGISTERED

## 2020-06-04 PROCEDURE — 63710000001 AMLODIPINE 5 MG TABLET: Performed by: NEUROLOGICAL SURGERY

## 2020-06-04 PROCEDURE — 94799 UNLISTED PULMONARY SVC/PX: CPT

## 2020-06-04 PROCEDURE — 25010000002 DEXAMETHASONE PER 1 MG: Performed by: NURSE ANESTHETIST, CERTIFIED REGISTERED

## 2020-06-04 PROCEDURE — 25010000002 NEOSTIGMINE 10 MG/10ML SOLUTION: Performed by: NURSE ANESTHETIST, CERTIFIED REGISTERED

## 2020-06-04 PROCEDURE — 63710000001 FINASTERIDE 5 MG TABLET: Performed by: NEUROLOGICAL SURGERY

## 2020-06-04 PROCEDURE — 63710000001 ACETAMINOPHEN 500 MG TABLET: Performed by: NEUROLOGICAL SURGERY

## 2020-06-04 PROCEDURE — 63047 LAM FACETEC & FORAMOT LUMBAR: CPT | Performed by: PHYSICIAN ASSISTANT

## 2020-06-04 PROCEDURE — 63710000001 FAMOTIDINE 20 MG TABLET: Performed by: ANESTHESIOLOGY

## 2020-06-04 PROCEDURE — 63710000001 LISINOPRIL 10 MG TABLET 1 EACH BLISTER: Performed by: NEUROLOGICAL SURGERY

## 2020-06-04 PROCEDURE — A9270 NON-COVERED ITEM OR SERVICE: HCPCS | Performed by: ANESTHESIOLOGY

## 2020-06-04 PROCEDURE — 63710000001 HYDROCHLOROTHIAZIDE 12.5 MG CAPSULE 1 EACH BLISTER: Performed by: NEUROLOGICAL SURGERY

## 2020-06-04 DEVICE — FLOSEAL HEMOSTATIC MATRIX, 10ML
Type: IMPLANTABLE DEVICE | Site: SPINE LUMBAR | Status: FUNCTIONAL
Brand: FLOSEAL HEMOSTATIC MATRIX

## 2020-06-04 RX ORDER — NEOSTIGMINE METHYLSULFATE 1 MG/ML
INJECTION, SOLUTION INTRAVENOUS AS NEEDED
Status: DISCONTINUED | OUTPATIENT
Start: 2020-06-04 | End: 2020-06-04 | Stop reason: SURG

## 2020-06-04 RX ORDER — ATORVASTATIN CALCIUM 20 MG/1
20 TABLET, FILM COATED ORAL NIGHTLY
Status: DISCONTINUED | OUTPATIENT
Start: 2020-06-04 | End: 2020-06-06 | Stop reason: HOSPADM

## 2020-06-04 RX ORDER — SODIUM CHLORIDE 0.9 % (FLUSH) 0.9 %
3-10 SYRINGE (ML) INJECTION AS NEEDED
Status: DISCONTINUED | OUTPATIENT
Start: 2020-06-04 | End: 2020-06-04 | Stop reason: HOSPADM

## 2020-06-04 RX ORDER — ACETAMINOPHEN 500 MG
1000 TABLET ORAL ONCE
Status: COMPLETED | OUTPATIENT
Start: 2020-06-04 | End: 2020-06-04

## 2020-06-04 RX ORDER — ONDANSETRON 4 MG/1
4 TABLET, FILM COATED ORAL EVERY 6 HOURS PRN
Status: DISCONTINUED | OUTPATIENT
Start: 2020-06-04 | End: 2020-06-06 | Stop reason: HOSPADM

## 2020-06-04 RX ORDER — SODIUM CHLORIDE 0.9 % (FLUSH) 0.9 %
3 SYRINGE (ML) INJECTION EVERY 12 HOURS SCHEDULED
Status: DISCONTINUED | OUTPATIENT
Start: 2020-06-04 | End: 2020-06-04 | Stop reason: HOSPADM

## 2020-06-04 RX ORDER — CEFAZOLIN SODIUM 2 G/100ML
2 INJECTION, SOLUTION INTRAVENOUS EVERY 8 HOURS
Status: COMPLETED | OUTPATIENT
Start: 2020-06-05 | End: 2020-06-05

## 2020-06-04 RX ORDER — FINASTERIDE 5 MG/1
5 TABLET, FILM COATED ORAL DAILY
Status: DISCONTINUED | OUTPATIENT
Start: 2020-06-04 | End: 2020-06-06 | Stop reason: HOSPADM

## 2020-06-04 RX ORDER — PROMETHAZINE HYDROCHLORIDE 25 MG/1
25 SUPPOSITORY RECTAL ONCE AS NEEDED
Status: DISCONTINUED | OUTPATIENT
Start: 2020-06-04 | End: 2020-06-04 | Stop reason: HOSPADM

## 2020-06-04 RX ORDER — TERAZOSIN 1 MG/1
1 CAPSULE ORAL NIGHTLY
Status: DISCONTINUED | OUTPATIENT
Start: 2020-06-04 | End: 2020-06-06 | Stop reason: HOSPADM

## 2020-06-04 RX ORDER — BUPIVACAINE HYDROCHLORIDE AND EPINEPHRINE 2.5; 5 MG/ML; UG/ML
INJECTION, SOLUTION EPIDURAL; INFILTRATION; INTRACAUDAL; PERINEURAL AS NEEDED
Status: DISCONTINUED | OUTPATIENT
Start: 2020-06-04 | End: 2020-06-04 | Stop reason: HOSPADM

## 2020-06-04 RX ORDER — PROMETHAZINE HYDROCHLORIDE 25 MG/ML
6.25 INJECTION, SOLUTION INTRAMUSCULAR; INTRAVENOUS ONCE AS NEEDED
Status: DISCONTINUED | OUTPATIENT
Start: 2020-06-04 | End: 2020-06-04 | Stop reason: HOSPADM

## 2020-06-04 RX ORDER — SODIUM CHLORIDE 0.9 % (FLUSH) 0.9 %
10 SYRINGE (ML) INJECTION AS NEEDED
Status: DISCONTINUED | OUTPATIENT
Start: 2020-06-04 | End: 2020-06-06 | Stop reason: HOSPADM

## 2020-06-04 RX ORDER — PROMETHAZINE HYDROCHLORIDE 12.5 MG/1
12.5 TABLET ORAL EVERY 6 HOURS PRN
Status: DISCONTINUED | OUTPATIENT
Start: 2020-06-04 | End: 2020-06-06 | Stop reason: HOSPADM

## 2020-06-04 RX ORDER — PROMETHAZINE HYDROCHLORIDE 25 MG/ML
12.5 INJECTION, SOLUTION INTRAMUSCULAR; INTRAVENOUS EVERY 6 HOURS PRN
Status: DISCONTINUED | OUTPATIENT
Start: 2020-06-04 | End: 2020-06-06 | Stop reason: HOSPADM

## 2020-06-04 RX ORDER — OXYCODONE AND ACETAMINOPHEN 7.5; 325 MG/1; MG/1
1 TABLET ORAL EVERY 4 HOURS PRN
Status: DISCONTINUED | OUTPATIENT
Start: 2020-06-04 | End: 2020-06-06 | Stop reason: HOSPADM

## 2020-06-04 RX ORDER — LIDOCAINE HYDROCHLORIDE 10 MG/ML
INJECTION, SOLUTION EPIDURAL; INFILTRATION; INTRACAUDAL; PERINEURAL AS NEEDED
Status: DISCONTINUED | OUTPATIENT
Start: 2020-06-04 | End: 2020-06-04 | Stop reason: SURG

## 2020-06-04 RX ORDER — IPRATROPIUM BROMIDE AND ALBUTEROL SULFATE 2.5; .5 MG/3ML; MG/3ML
3 SOLUTION RESPIRATORY (INHALATION) ONCE AS NEEDED
Status: DISCONTINUED | OUTPATIENT
Start: 2020-06-04 | End: 2020-06-04 | Stop reason: HOSPADM

## 2020-06-04 RX ORDER — MAGNESIUM HYDROXIDE 1200 MG/15ML
LIQUID ORAL AS NEEDED
Status: DISCONTINUED | OUTPATIENT
Start: 2020-06-04 | End: 2020-06-04 | Stop reason: HOSPADM

## 2020-06-04 RX ORDER — SODIUM CHLORIDE 0.9 % (FLUSH) 0.9 %
3 SYRINGE (ML) INJECTION EVERY 12 HOURS SCHEDULED
Status: DISCONTINUED | OUTPATIENT
Start: 2020-06-04 | End: 2020-06-06 | Stop reason: HOSPADM

## 2020-06-04 RX ORDER — MEPERIDINE HYDROCHLORIDE 25 MG/ML
12.5 INJECTION INTRAMUSCULAR; INTRAVENOUS; SUBCUTANEOUS
Status: DISCONTINUED | OUTPATIENT
Start: 2020-06-04 | End: 2020-06-04 | Stop reason: HOSPADM

## 2020-06-04 RX ORDER — AMLODIPINE BESYLATE 5 MG/1
5 TABLET ORAL DAILY
Status: DISCONTINUED | OUTPATIENT
Start: 2020-06-04 | End: 2020-06-06 | Stop reason: HOSPADM

## 2020-06-04 RX ORDER — NALOXONE HCL 0.4 MG/ML
0.4 VIAL (ML) INJECTION
Status: DISCONTINUED | OUTPATIENT
Start: 2020-06-04 | End: 2020-06-06 | Stop reason: HOSPADM

## 2020-06-04 RX ORDER — IBUPROFEN 800 MG/1
800 TABLET ORAL ONCE
Status: COMPLETED | OUTPATIENT
Start: 2020-06-04 | End: 2020-06-04

## 2020-06-04 RX ORDER — DEXAMETHASONE SODIUM PHOSPHATE 4 MG/ML
INJECTION, SOLUTION INTRA-ARTICULAR; INTRALESIONAL; INTRAMUSCULAR; INTRAVENOUS; SOFT TISSUE AS NEEDED
Status: DISCONTINUED | OUTPATIENT
Start: 2020-06-04 | End: 2020-06-04 | Stop reason: SURG

## 2020-06-04 RX ORDER — ONDANSETRON 2 MG/ML
4 INJECTION INTRAMUSCULAR; INTRAVENOUS ONCE AS NEEDED
Status: DISCONTINUED | OUTPATIENT
Start: 2020-06-04 | End: 2020-06-04 | Stop reason: HOSPADM

## 2020-06-04 RX ORDER — LABETALOL HYDROCHLORIDE 5 MG/ML
5 INJECTION, SOLUTION INTRAVENOUS
Status: DISCONTINUED | OUTPATIENT
Start: 2020-06-04 | End: 2020-06-04 | Stop reason: HOSPADM

## 2020-06-04 RX ORDER — FENTANYL CITRATE 50 UG/ML
INJECTION, SOLUTION INTRAMUSCULAR; INTRAVENOUS AS NEEDED
Status: DISCONTINUED | OUTPATIENT
Start: 2020-06-04 | End: 2020-06-04 | Stop reason: SURG

## 2020-06-04 RX ORDER — MORPHINE SULFATE 4 MG/ML
4 INJECTION, SOLUTION INTRAMUSCULAR; INTRAVENOUS EVERY 4 HOURS PRN
Status: DISCONTINUED | OUTPATIENT
Start: 2020-06-04 | End: 2020-06-06 | Stop reason: HOSPADM

## 2020-06-04 RX ORDER — ACETAMINOPHEN 325 MG/1
650 TABLET ORAL EVERY 4 HOURS PRN
Status: DISCONTINUED | OUTPATIENT
Start: 2020-06-04 | End: 2020-06-06 | Stop reason: HOSPADM

## 2020-06-04 RX ORDER — MORPHINE SULFATE 4 MG/ML
2 INJECTION, SOLUTION INTRAMUSCULAR; INTRAVENOUS EVERY 4 HOURS PRN
Status: DISCONTINUED | OUTPATIENT
Start: 2020-06-04 | End: 2020-06-06 | Stop reason: HOSPADM

## 2020-06-04 RX ORDER — METOPROLOL SUCCINATE 100 MG/1
100 TABLET, EXTENDED RELEASE ORAL DAILY
Status: DISCONTINUED | OUTPATIENT
Start: 2020-06-05 | End: 2020-06-06 | Stop reason: HOSPADM

## 2020-06-04 RX ORDER — IBUPROFEN 600 MG/1
600 TABLET ORAL EVERY 8 HOURS
Status: DISCONTINUED | OUTPATIENT
Start: 2020-06-04 | End: 2020-06-06 | Stop reason: HOSPADM

## 2020-06-04 RX ORDER — GLYCOPYRROLATE 0.2 MG/ML
INJECTION INTRAMUSCULAR; INTRAVENOUS AS NEEDED
Status: DISCONTINUED | OUTPATIENT
Start: 2020-06-04 | End: 2020-06-04 | Stop reason: SURG

## 2020-06-04 RX ORDER — DIPHENHYDRAMINE HCL 25 MG
25 CAPSULE ORAL NIGHTLY PRN
Status: DISCONTINUED | OUTPATIENT
Start: 2020-06-04 | End: 2020-06-06 | Stop reason: HOSPADM

## 2020-06-04 RX ORDER — SODIUM CHLORIDE 0.9 % (FLUSH) 0.9 %
10 SYRINGE (ML) INJECTION AS NEEDED
Status: DISCONTINUED | OUTPATIENT
Start: 2020-06-04 | End: 2020-06-04 | Stop reason: HOSPADM

## 2020-06-04 RX ORDER — PROPOFOL 10 MG/ML
VIAL (ML) INTRAVENOUS AS NEEDED
Status: DISCONTINUED | OUTPATIENT
Start: 2020-06-04 | End: 2020-06-04 | Stop reason: SURG

## 2020-06-04 RX ORDER — DULOXETIN HYDROCHLORIDE 30 MG/1
30 CAPSULE, DELAYED RELEASE ORAL DAILY
Status: DISCONTINUED | OUTPATIENT
Start: 2020-06-04 | End: 2020-06-06 | Stop reason: HOSPADM

## 2020-06-04 RX ORDER — SODIUM CHLORIDE, SODIUM LACTATE, POTASSIUM CHLORIDE, CALCIUM CHLORIDE 600; 310; 30; 20 MG/100ML; MG/100ML; MG/100ML; MG/100ML
90 INJECTION, SOLUTION INTRAVENOUS CONTINUOUS
Status: DISCONTINUED | OUTPATIENT
Start: 2020-06-04 | End: 2020-06-05

## 2020-06-04 RX ORDER — NALOXONE HCL 0.4 MG/ML
0.4 VIAL (ML) INJECTION AS NEEDED
Status: DISCONTINUED | OUTPATIENT
Start: 2020-06-04 | End: 2020-06-04 | Stop reason: HOSPADM

## 2020-06-04 RX ORDER — ATRACURIUM BESYLATE 10 MG/ML
INJECTION, SOLUTION INTRAVENOUS AS NEEDED
Status: DISCONTINUED | OUTPATIENT
Start: 2020-06-04 | End: 2020-06-04 | Stop reason: SURG

## 2020-06-04 RX ORDER — FENTANYL CITRATE 50 UG/ML
50 INJECTION, SOLUTION INTRAMUSCULAR; INTRAVENOUS
Status: DISCONTINUED | OUTPATIENT
Start: 2020-06-04 | End: 2020-06-04 | Stop reason: HOSPADM

## 2020-06-04 RX ORDER — HYDROMORPHONE HYDROCHLORIDE 1 MG/ML
0.5 INJECTION, SOLUTION INTRAMUSCULAR; INTRAVENOUS; SUBCUTANEOUS
Status: DISCONTINUED | OUTPATIENT
Start: 2020-06-04 | End: 2020-06-04 | Stop reason: HOSPADM

## 2020-06-04 RX ORDER — ONDANSETRON 2 MG/ML
4 INJECTION INTRAMUSCULAR; INTRAVENOUS EVERY 6 HOURS PRN
Status: DISCONTINUED | OUTPATIENT
Start: 2020-06-04 | End: 2020-06-06 | Stop reason: HOSPADM

## 2020-06-04 RX ORDER — PROMETHAZINE HYDROCHLORIDE 12.5 MG/1
12.5 SUPPOSITORY RECTAL EVERY 6 HOURS PRN
Status: DISCONTINUED | OUTPATIENT
Start: 2020-06-04 | End: 2020-06-06 | Stop reason: HOSPADM

## 2020-06-04 RX ORDER — FAMOTIDINE 20 MG/1
20 TABLET, FILM COATED ORAL ONCE
Status: COMPLETED | OUTPATIENT
Start: 2020-06-04 | End: 2020-06-04

## 2020-06-04 RX ORDER — LIDOCAINE HYDROCHLORIDE 10 MG/ML
0.5 INJECTION, SOLUTION EPIDURAL; INFILTRATION; INTRACAUDAL; PERINEURAL ONCE AS NEEDED
Status: COMPLETED | OUTPATIENT
Start: 2020-06-04 | End: 2020-06-04

## 2020-06-04 RX ORDER — OXYCODONE HCL 10 MG/1
10 TABLET, FILM COATED, EXTENDED RELEASE ORAL ONCE
Status: COMPLETED | OUTPATIENT
Start: 2020-06-04 | End: 2020-06-04

## 2020-06-04 RX ORDER — HYDRALAZINE HYDROCHLORIDE 20 MG/ML
5 INJECTION INTRAMUSCULAR; INTRAVENOUS
Status: DISCONTINUED | OUTPATIENT
Start: 2020-06-04 | End: 2020-06-04 | Stop reason: HOSPADM

## 2020-06-04 RX ORDER — PROMETHAZINE HYDROCHLORIDE 25 MG/1
25 TABLET ORAL ONCE AS NEEDED
Status: DISCONTINUED | OUTPATIENT
Start: 2020-06-04 | End: 2020-06-04 | Stop reason: HOSPADM

## 2020-06-04 RX ORDER — SODIUM CHLORIDE 0.9 % (FLUSH) 0.9 %
10 SYRINGE (ML) INJECTION EVERY 12 HOURS SCHEDULED
Status: DISCONTINUED | OUTPATIENT
Start: 2020-06-04 | End: 2020-06-04 | Stop reason: HOSPADM

## 2020-06-04 RX ORDER — ALLOPURINOL 100 MG/1
100 TABLET ORAL DAILY
Status: DISCONTINUED | OUTPATIENT
Start: 2020-06-05 | End: 2020-06-06 | Stop reason: HOSPADM

## 2020-06-04 RX ORDER — CEFAZOLIN SODIUM 2 G/100ML
2 INJECTION, SOLUTION INTRAVENOUS ONCE
Status: COMPLETED | OUTPATIENT
Start: 2020-06-04 | End: 2020-06-04

## 2020-06-04 RX ORDER — SODIUM CHLORIDE, SODIUM LACTATE, POTASSIUM CHLORIDE, CALCIUM CHLORIDE 600; 310; 30; 20 MG/100ML; MG/100ML; MG/100ML; MG/100ML
9 INJECTION, SOLUTION INTRAVENOUS CONTINUOUS
Status: DISCONTINUED | OUTPATIENT
Start: 2020-06-04 | End: 2020-06-06 | Stop reason: HOSPADM

## 2020-06-04 RX ORDER — ONDANSETRON 2 MG/ML
INJECTION INTRAMUSCULAR; INTRAVENOUS AS NEEDED
Status: DISCONTINUED | OUTPATIENT
Start: 2020-06-04 | End: 2020-06-04 | Stop reason: SURG

## 2020-06-04 RX ADMIN — ONDANSETRON 4 MG: 2 INJECTION INTRAMUSCULAR; INTRAVENOUS at 15:17

## 2020-06-04 RX ADMIN — CEFAZOLIN SODIUM 2 G: 2 INJECTION, SOLUTION INTRAVENOUS at 23:09

## 2020-06-04 RX ADMIN — CEFAZOLIN SODIUM 2 G: 2 INJECTION, SOLUTION INTRAVENOUS at 13:58

## 2020-06-04 RX ADMIN — ACETAMINOPHEN 1000 MG: 500 TABLET ORAL at 10:01

## 2020-06-04 RX ADMIN — AMLODIPINE BESYLATE 5 MG: 5 TABLET ORAL at 20:16

## 2020-06-04 RX ADMIN — LIDOCAINE HYDROCHLORIDE 0.5 ML: 10 INJECTION, SOLUTION EPIDURAL; INFILTRATION; INTRACAUDAL; PERINEURAL at 10:01

## 2020-06-04 RX ADMIN — IBUPROFEN 800 MG: 800 TABLET ORAL at 10:02

## 2020-06-04 RX ADMIN — OXYCODONE HYDROCHLORIDE 10 MG: 10 TABLET, FILM COATED, EXTENDED RELEASE ORAL at 10:01

## 2020-06-04 RX ADMIN — NEOSTIGMINE 3 MG: 1 INJECTION INTRAVENOUS at 15:28

## 2020-06-04 RX ADMIN — DULOXETINE HYDROCHLORIDE 30 MG: 30 CAPSULE, DELAYED RELEASE ORAL at 20:16

## 2020-06-04 RX ADMIN — FENTANYL CITRATE 50 MCG: 50 INJECTION, SOLUTION INTRAMUSCULAR; INTRAVENOUS at 15:36

## 2020-06-04 RX ADMIN — EPHEDRINE SULFATE 10 MG: 50 INJECTION INTRAMUSCULAR; INTRAVENOUS; SUBCUTANEOUS at 14:37

## 2020-06-04 RX ADMIN — GLYCOPYRROLATE 0.4 MG: 0.2 INJECTION INTRAMUSCULAR; INTRAVENOUS at 15:28

## 2020-06-04 RX ADMIN — EPHEDRINE SULFATE 10 MG: 50 INJECTION INTRAMUSCULAR; INTRAVENOUS; SUBCUTANEOUS at 14:19

## 2020-06-04 RX ADMIN — PROPOFOL 180 MG: 10 INJECTION, EMULSION INTRAVENOUS at 14:02

## 2020-06-04 RX ADMIN — TERAZOSIN HYDROCHLORIDE 1 MG: 1 CAPSULE ORAL at 20:16

## 2020-06-04 RX ADMIN — EPHEDRINE SULFATE 10 MG: 50 INJECTION INTRAMUSCULAR; INTRAVENOUS; SUBCUTANEOUS at 14:14

## 2020-06-04 RX ADMIN — SODIUM CHLORIDE, POTASSIUM CHLORIDE, SODIUM LACTATE AND CALCIUM CHLORIDE 90 ML/HR: 600; 310; 30; 20 INJECTION, SOLUTION INTRAVENOUS at 17:04

## 2020-06-04 RX ADMIN — SODIUM CHLORIDE, POTASSIUM CHLORIDE, SODIUM LACTATE AND CALCIUM CHLORIDE 9 ML/HR: 600; 310; 30; 20 INJECTION, SOLUTION INTRAVENOUS at 10:02

## 2020-06-04 RX ADMIN — IBUPROFEN 600 MG: 600 TABLET, FILM COATED ORAL at 17:39

## 2020-06-04 RX ADMIN — DEXAMETHASONE SODIUM PHOSPHATE 8 MG: 4 INJECTION, SOLUTION INTRAMUSCULAR; INTRAVENOUS at 14:12

## 2020-06-04 RX ADMIN — FAMOTIDINE 20 MG: 20 TABLET, FILM COATED ORAL at 10:01

## 2020-06-04 RX ADMIN — FINASTERIDE 5 MG: 5 TABLET, FILM COATED ORAL at 20:16

## 2020-06-04 RX ADMIN — FENTANYL CITRATE 50 MCG: 50 INJECTION, SOLUTION INTRAMUSCULAR; INTRAVENOUS at 14:02

## 2020-06-04 RX ADMIN — LIDOCAINE HYDROCHLORIDE 50 MG: 10 INJECTION, SOLUTION EPIDURAL; INFILTRATION; INTRACAUDAL; PERINEURAL at 14:02

## 2020-06-04 RX ADMIN — ATORVASTATIN CALCIUM 20 MG: 20 TABLET, FILM COATED ORAL at 20:16

## 2020-06-04 RX ADMIN — LISINOPRIL: 10 TABLET ORAL at 17:36

## 2020-06-04 RX ADMIN — ATRACURIUM BESYLATE 40 MG: 10 INJECTION, SOLUTION INTRAVENOUS at 14:02

## 2020-06-04 RX ADMIN — ATRACURIUM BESYLATE 10 MG: 10 INJECTION, SOLUTION INTRAVENOUS at 14:44

## 2020-06-04 NOTE — ANESTHESIA PREPROCEDURE EVALUATION
Anesthesia Evaluation     Patient summary reviewed and Nursing notes reviewed                Airway   Mallampati: II  Dental    (+) poor dentition        Pulmonary - negative pulmonary ROS   Cardiovascular     (+) hypertension,       Neuro/Psych- negative ROS  GI/Hepatic/Renal/Endo    (+)   diabetes mellitus,     Musculoskeletal (-) negative ROS    Abdominal    Substance History - negative use     OB/GYN negative ob/gyn ROS         Other                      Anesthesia Plan    ASA 3     general     intravenous induction     Anesthetic plan, all risks, benefits, and alternatives have been provided, discussed and informed consent has been obtained with: patient.

## 2020-06-04 NOTE — ANESTHESIA PROCEDURE NOTES
Airway  Urgency: elective    Date/Time: 6/4/2020 2:03 PM  Airway not difficult    General Information and Staff    Patient location during procedure: OR  CRNA: Katya Allison CRNA    Indications and Patient Condition  Indications for airway management: airway protection    Preoxygenated: yes  MILS not maintained throughout  Mask difficulty assessment: 1 - vent by mask    Final Airway Details  Final airway type: endotracheal airway      Successful airway: ETT  Cuffed: yes   Successful intubation technique: direct laryngoscopy  Facilitating devices/methods: intubating stylet  Endotracheal tube insertion site: oral  Blade: Marquis  Blade size: 4  ETT size (mm): 7.5  Cormack-Lehane Classification: grade I - full view of glottis  Placement verified by: chest auscultation and capnometry   Measured from: lips  ETT/EBT  to lips (cm): 21  Number of attempts at approach: 1  Assessment: lips, teeth, and gum same as pre-op and atraumatic intubation    Additional Comments  Extremely poor dentition.   Negative epigastric sounds, Breath sound equal bilaterally with symmetric chest rise and fall

## 2020-06-04 NOTE — ANESTHESIA POSTPROCEDURE EVALUATION
Patient: John Leyva    Procedure Summary     Date:  06/04/20 Room / Location:   [facility]  /  [facility]    Anesthesia Start:  1358 Anesthesia Stop:  1548    Procedure:  LUMBAR LAMINECTOMY L4-5, HEMILAMINECTOMY L3-4 RIGHT (Right Spine Lumbar) Diagnosis:       Lumbar stenosis with neurogenic claudication      (Lumbar stenosis with neurogenic claudication [M48.062])    Surgeon:  Paulie Daniels MD Provider:  Chencho Rodrigues MD    Anesthesia Type:  general ASA Status:  3          Anesthesia Type: general    Vitals  Vitals Value Taken Time   /95 6/4/2020  3:48 PM   Temp 97.9 °F (36.6 °C) 6/4/2020  3:48 PM   Pulse 105 6/4/2020  3:48 PM   Resp 18 6/4/2020  3:48 PM   SpO2 95 % 6/4/2020  3:48 PM           Post Anesthesia Care and Evaluation    Patient location during evaluation: PACU  Patient participation: complete - patient participated  Level of consciousness: awake and alert  Pain score: 0  Pain management: adequate  Airway patency: patent  Anesthetic complications: No anesthetic complications  PONV Status: none  Cardiovascular status: hemodynamically stable and acceptable  Respiratory status: nonlabored ventilation, acceptable and nasal cannula  Hydration status: acceptable

## 2020-06-05 PROCEDURE — A9270 NON-COVERED ITEM OR SERVICE: HCPCS | Performed by: NEUROLOGICAL SURGERY

## 2020-06-05 PROCEDURE — 63710000001 AMLODIPINE 5 MG TABLET: Performed by: NEUROLOGICAL SURGERY

## 2020-06-05 PROCEDURE — 63710000001 LISINOPRIL 10 MG TABLET 1 EACH BLISTER: Performed by: NEUROLOGICAL SURGERY

## 2020-06-05 PROCEDURE — 63710000001 HYDROCHLOROTHIAZIDE 12.5 MG CAPSULE 1 EACH BLISTER: Performed by: NEUROLOGICAL SURGERY

## 2020-06-05 PROCEDURE — 63710000001 IBUPROFEN 600 MG TABLET: Performed by: NEUROLOGICAL SURGERY

## 2020-06-05 PROCEDURE — 63710000001 DULOXETINE 30 MG CAPSULE DELAYED-RELEASE PARTICLES: Performed by: NEUROLOGICAL SURGERY

## 2020-06-05 PROCEDURE — 63710000001 METOPROLOL SUCCINATE XL 100 MG TABLET SUSTAINED-RELEASE 24 HOUR: Performed by: NEUROLOGICAL SURGERY

## 2020-06-05 PROCEDURE — 63710000001 ALLOPURINOL 100 MG TABLET: Performed by: NEUROLOGICAL SURGERY

## 2020-06-05 PROCEDURE — 99024 POSTOP FOLLOW-UP VISIT: CPT | Performed by: NEUROLOGICAL SURGERY

## 2020-06-05 PROCEDURE — 63710000001 ATORVASTATIN 20 MG TABLET: Performed by: NEUROLOGICAL SURGERY

## 2020-06-05 PROCEDURE — 63710000001 BETHANECHOL 10 MG TABLET: Performed by: NEUROLOGICAL SURGERY

## 2020-06-05 PROCEDURE — 25010000003 CEFAZOLIN IN DEXTROSE 2-4 GM/100ML-% SOLUTION: Performed by: NEUROLOGICAL SURGERY

## 2020-06-05 PROCEDURE — P9612 CATHETERIZE FOR URINE SPEC: HCPCS

## 2020-06-05 PROCEDURE — 63710000001 TERAZOSIN 1 MG CAPSULE: Performed by: NEUROLOGICAL SURGERY

## 2020-06-05 PROCEDURE — 63710000001 FINASTERIDE 5 MG TABLET: Performed by: NEUROLOGICAL SURGERY

## 2020-06-05 RX ORDER — BETHANECHOL CHLORIDE 10 MG/1
10 TABLET ORAL
Status: DISCONTINUED | OUTPATIENT
Start: 2020-06-05 | End: 2020-06-06 | Stop reason: HOSPADM

## 2020-06-05 RX ADMIN — BETHANECHOL CHLORIDE 10 MG: 10 TABLET ORAL at 17:53

## 2020-06-05 RX ADMIN — AMLODIPINE BESYLATE 5 MG: 5 TABLET ORAL at 19:36

## 2020-06-05 RX ADMIN — DULOXETINE HYDROCHLORIDE 30 MG: 30 CAPSULE, DELAYED RELEASE ORAL at 19:36

## 2020-06-05 RX ADMIN — METOPROLOL SUCCINATE 100 MG: 100 TABLET, EXTENDED RELEASE ORAL at 08:32

## 2020-06-05 RX ADMIN — BETHANECHOL CHLORIDE 10 MG: 10 TABLET ORAL at 11:48

## 2020-06-05 RX ADMIN — BETHANECHOL CHLORIDE 10 MG: 10 TABLET ORAL at 08:32

## 2020-06-05 RX ADMIN — CEFAZOLIN SODIUM 2 G: 2 INJECTION, SOLUTION INTRAVENOUS at 08:33

## 2020-06-05 RX ADMIN — LISINOPRIL: 10 TABLET ORAL at 08:32

## 2020-06-05 RX ADMIN — IBUPROFEN 600 MG: 600 TABLET, FILM COATED ORAL at 08:33

## 2020-06-05 RX ADMIN — IBUPROFEN 600 MG: 600 TABLET, FILM COATED ORAL at 02:09

## 2020-06-05 RX ADMIN — SODIUM CHLORIDE, PRESERVATIVE FREE 3 ML: 5 INJECTION INTRAVENOUS at 19:37

## 2020-06-05 RX ADMIN — ALLOPURINOL 100 MG: 100 TABLET ORAL at 08:32

## 2020-06-05 RX ADMIN — FINASTERIDE 5 MG: 5 TABLET, FILM COATED ORAL at 19:37

## 2020-06-05 RX ADMIN — IBUPROFEN 600 MG: 600 TABLET, FILM COATED ORAL at 17:53

## 2020-06-05 RX ADMIN — TERAZOSIN HYDROCHLORIDE 1 MG: 1 CAPSULE ORAL at 19:36

## 2020-06-05 RX ADMIN — ATORVASTATIN CALCIUM 20 MG: 20 TABLET, FILM COATED ORAL at 19:36

## 2020-06-06 VITALS
HEIGHT: 68 IN | RESPIRATION RATE: 16 BRPM | HEART RATE: 58 BPM | TEMPERATURE: 98 F | OXYGEN SATURATION: 93 % | WEIGHT: 245 LBS | SYSTOLIC BLOOD PRESSURE: 129 MMHG | BODY MASS INDEX: 37.13 KG/M2 | DIASTOLIC BLOOD PRESSURE: 68 MMHG

## 2020-06-06 PROCEDURE — A9270 NON-COVERED ITEM OR SERVICE: HCPCS | Performed by: NEUROLOGICAL SURGERY

## 2020-06-06 PROCEDURE — 63710000001 LISINOPRIL 10 MG TABLET 100 EACH BOX: Performed by: NEUROLOGICAL SURGERY

## 2020-06-06 PROCEDURE — 63710000001 METOPROLOL SUCCINATE XL 100 MG TABLET SUSTAINED-RELEASE 24 HOUR: Performed by: NEUROLOGICAL SURGERY

## 2020-06-06 PROCEDURE — 63710000001 BETHANECHOL 10 MG TABLET: Performed by: NEUROLOGICAL SURGERY

## 2020-06-06 PROCEDURE — 99024 POSTOP FOLLOW-UP VISIT: CPT | Performed by: NEUROLOGICAL SURGERY

## 2020-06-06 PROCEDURE — 63710000001 HYDROCHLOROTHIAZIDE 12.5 MG CAPSULE 1 EACH BLISTER: Performed by: NEUROLOGICAL SURGERY

## 2020-06-06 PROCEDURE — 63710000001 IBUPROFEN 600 MG TABLET: Performed by: NEUROLOGICAL SURGERY

## 2020-06-06 PROCEDURE — 63710000001 ALLOPURINOL 100 MG TABLET: Performed by: NEUROLOGICAL SURGERY

## 2020-06-06 RX ADMIN — METOPROLOL SUCCINATE 100 MG: 100 TABLET, EXTENDED RELEASE ORAL at 08:22

## 2020-06-06 RX ADMIN — IBUPROFEN 600 MG: 600 TABLET, FILM COATED ORAL at 08:22

## 2020-06-06 RX ADMIN — LISINOPRIL: 10 TABLET ORAL at 08:22

## 2020-06-06 RX ADMIN — BETHANECHOL CHLORIDE 10 MG: 10 TABLET ORAL at 08:22

## 2020-06-06 RX ADMIN — ALLOPURINOL 100 MG: 100 TABLET ORAL at 08:22

## 2020-06-26 ENCOUNTER — OFFICE VISIT (OUTPATIENT)
Dept: NEUROSURGERY | Facility: CLINIC | Age: 75
End: 2020-06-26

## 2020-06-26 VITALS
TEMPERATURE: 98.2 F | HEIGHT: 68 IN | BODY MASS INDEX: 38.34 KG/M2 | DIASTOLIC BLOOD PRESSURE: 72 MMHG | SYSTOLIC BLOOD PRESSURE: 142 MMHG | WEIGHT: 253 LBS

## 2020-06-26 DIAGNOSIS — M51.26 HERNIATED LUMBAR INTERVERTEBRAL DISC: ICD-10-CM

## 2020-06-26 DIAGNOSIS — M43.16 SPONDYLOLISTHESIS OF LUMBAR REGION: ICD-10-CM

## 2020-06-26 DIAGNOSIS — M48.062 SPINAL STENOSIS OF LUMBAR REGION WITH NEUROGENIC CLAUDICATION: Primary | ICD-10-CM

## 2020-06-26 PROCEDURE — 99024 POSTOP FOLLOW-UP VISIT: CPT | Performed by: PHYSICIAN ASSISTANT

## 2020-06-26 NOTE — PROGRESS NOTES
Patient: John Leyva  : 1945  Chart #: [id number]    Date of Service: 2020    CHIEF COMPLAINT: Lumbar stenosis with neurogenic claudication    History of Present Illness Mr. Leyva is a 74-year-old gentleman with a history of back and lower extremity pain with walking and standing intolerance.  His preoperative studies demonstrated a generous grade stenosis in the lumbar spine.  As such on 2020 he underwent laminectomies L3-4 and L4-5.    Today patient is 3 weeks postop.  He feels much better when walking.  When he internally rotates his right leg he has some pain down the lateral aspect to the knee.  Otherwise he denies pain into his legs.  Occasionally he will feel a twinge of pain in the low back.  He takes Tylenol for pain.  No incisional issues.  Overall he is very pleased with his progress.      Past Medical History:   Diagnosis Date   • BPH (benign prostatic hyperplasia)    • Diabetes mellitus (CMS/HCC)     no medications    • ED (erectile dysfunction)    • Elevated PSA    • Gout    • Hypertension    • Hypogonadism in male    • Hypothyroidism     no medications   • Low kidney function    • Wears glasses          Current Outpatient Medications:   •  allopurinol (ZYLOPRIM) 100 MG tablet, TAKE ONE TABLET BY MOUTH EVERY DAY, Disp: 30 tablet, Rfl: 1  •  amLODIPine (NORVASC) 5 MG tablet, Take 1 tablet by mouth Daily., Disp: 30 tablet, Rfl: 3  •  atorvastatin (LIPITOR) 20 MG tablet, TAKE ONE TABLET BY MOUTH EVERY DAY, Disp: 30 tablet, Rfl: 1  •  doxazosin (CARDURA) 2 MG tablet, Take 1 tablet by mouth Every Night., Disp: 90 tablet, Rfl: 3  •  DULoxetine (CYMBALTA) 30 MG capsule, Take 1 capsule by mouth Daily., Disp: 90 capsule, Rfl: 3  •  finasteride (PROSCAR) 5 MG tablet, Take 1 tablet by mouth Daily., Disp: 90 tablet, Rfl: 3  •  lisinopril-hydrochlorothiazide (PRINZIDE,ZESTORETIC) 10-12.5 MG per tablet, Take 1 tablet by mouth Daily., Disp: 90 tablet, Rfl: 3  •  metoprolol succinate XL (TOPROL-XL)  "100 MG 24 hr tablet, Take 1 tablet by mouth Daily., Disp: 90 tablet, Rfl: 3  •  Multiple Vitamins-Minerals (MULTIVITAMIN ADULT PO), Take  by mouth Daily., Disp: , Rfl:     Past Surgical History:   Procedure Laterality Date   • COLONOSCOPY     • ELBOW OLECRANNON BURSA EXCISION Right    • EYE SURGERY Bilateral     cataracts removed   • LUMBAR LAMINECTOMY DISCECTOMY DECOMPRESSION Right 2020    Procedure: LUMBAR LAMINECTOMY L4-5, HEMILAMINECTOMY L3-4 RIGHT;  Surgeon: Paulie Daniels MD;  Location: [facility];  Service: Neurosurgery;  Laterality: Right;   • RETINAL DETACHMENT SURGERY      x3 surgeries       Social History     Socioeconomic History   • Marital status:      Spouse name: Yolanda   • Number of children: Not on file   • Years of education: Not on file   • Highest education level: Not on file   Occupational History     Employer: RETIRED   Tobacco Use   • Smoking status: Former Smoker     Last attempt to quit:      Years since quittin.5   • Smokeless tobacco: Never Used   Substance and Sexual Activity   • Alcohol use: No   • Drug use: No   • Sexual activity: Defer   Social History Narrative    Lives in [facility] wife         Review of Systems   Constitutional: Positive for chills. Negative for diaphoresis, fatigue and fever.   Respiratory: Negative for shortness of breath.    Cardiovascular: Negative for chest pain and palpitations.   Gastrointestinal: Negative for abdominal pain, nausea and vomiting.   Musculoskeletal: Negative for back pain and neck pain.   Neurological: Negative for dizziness, syncope, weakness, light-headedness and headaches.   Psychiatric/Behavioral: Negative for confusion.   All other systems reviewed and are negative.      Objective   Vital Signs: Blood pressure 142/72, temperature 98.2 °F (36.8 °C), height 172.7 cm (68\"), weight 115 kg (253 lb).  Physical Exam  Musculoskeletal:  Patient has pain from the hip to the knee with internal rotation of the right leg.  " No trochanteric tenderness.  He ambulates with assistance of a walking cane.  Neurologic:  Muscle tone is normal throughout.  Patient is oriented to person, place, and time.  Skin: Lumbar incision is well-healed.      Assessment/Plan   Diagnosis: Lumbar stenosis with neurogenic claudication status post laminectomies L3-4 and L4-5    Medical Decision Making: Overall patient is doing very well.  He is ambulating more without difficulty.  He does have some low-grade symptoms in the right leg which may even be hip related.  We will continue to monitor.  He takes Tylenol occasionally for pain.  I went over activity limitations and restrictions as he slowly works back into his normal routine.  I encouraged more walking.  He will follow-up with Dr. Daniels in 6 weeks.  Call the office in the interim with any questions or concerns.        John was seen today for post-op.    Diagnoses and all orders for this visit:    Spinal stenosis of lumbar region with neurogenic claudication    Spondylolisthesis of lumbar region    Herniated lumbar intervertebral disc                     Patient's Body mass index is 38.47 kg/m². BMI is above normal parameters. Recommendations include: exercise counseling and nutrition counseling.         Mahogany Alejandra PA-C  Patient Care Team:  Mahesh Almanza MD as PCP - General (Internal Medicine)  Mahesh Almanza MD as Referring Physician (Internal Medicine)

## 2020-07-15 RX ORDER — ATORVASTATIN CALCIUM 20 MG/1
TABLET, FILM COATED ORAL
Qty: 30 TABLET | Refills: 1 | Status: SHIPPED | OUTPATIENT
Start: 2020-07-15 | End: 2020-09-19

## 2020-07-16 ENCOUNTER — TELEPHONE (OUTPATIENT)
Dept: INTERNAL MEDICINE | Facility: CLINIC | Age: 75
End: 2020-07-16

## 2020-08-03 RX ORDER — METOPROLOL SUCCINATE 100 MG/1
TABLET, EXTENDED RELEASE ORAL
Qty: 90 TABLET | Refills: 2 | Status: SHIPPED | OUTPATIENT
Start: 2020-08-03 | End: 2021-04-29

## 2020-08-03 RX ORDER — DULOXETIN HYDROCHLORIDE 30 MG/1
CAPSULE, DELAYED RELEASE ORAL
Qty: 90 CAPSULE | Refills: 2 | Status: SHIPPED | OUTPATIENT
Start: 2020-08-03 | End: 2021-04-29

## 2020-08-11 ENCOUNTER — OFFICE VISIT (OUTPATIENT)
Dept: NEUROSURGERY | Facility: CLINIC | Age: 75
End: 2020-08-11

## 2020-08-11 VITALS — BODY MASS INDEX: 37.44 KG/M2 | WEIGHT: 247 LBS | HEIGHT: 68 IN | RESPIRATION RATE: 15 BRPM | TEMPERATURE: 97.8 F

## 2020-08-11 DIAGNOSIS — Z98.890 STATUS POST LUMBAR SURGERY: Primary | ICD-10-CM

## 2020-08-11 DIAGNOSIS — M48.062 SPINAL STENOSIS OF LUMBAR REGION WITH NEUROGENIC CLAUDICATION: ICD-10-CM

## 2020-08-11 PROCEDURE — 99024 POSTOP FOLLOW-UP VISIT: CPT | Performed by: NEUROLOGICAL SURGERY

## 2020-08-11 RX ORDER — DOXAZOSIN 2 MG/1
TABLET ORAL
Qty: 90 TABLET | Refills: 2 | Status: SHIPPED | OUTPATIENT
Start: 2020-08-11 | End: 2021-05-27

## 2020-08-11 NOTE — PROGRESS NOTES
Patient: John Leyva  : 1945    Primary Care Provider: Mahesh Almanza MD    Requesting Provider: As above        History    Chief Complaint: Lumbar stenosis with neurogenic claudication.    History of Present Illness: Mr. Leyva is a 75-year-old gentleman who presented with back and lower extremity pain with walking and standing intolerance.  He was noted to have generous lumbar spinal stenosis and on 2020 he underwent decompressive laminectomies at L3-4 and L4-5.  Prior to discharge in the hospital he was doing well.  He was seen 3 weeks postop by Ms. Alejandra.  He was doing well except for  pain on the side of the right knee.  He indicates that he since then he has had a downhill course with increasing pain in his right hip and right lateral thigh that occurs with standing or walking.  He feels that his right foot is turning out more.    Review of Systems   Constitutional: Negative for activity change, appetite change, chills, diaphoresis, fatigue, fever and unexpected weight change.   HENT: Negative for congestion, dental problem, drooling, ear discharge, ear pain, facial swelling, hearing loss, mouth sores, nosebleeds, postnasal drip, rhinorrhea, sinus pressure, sneezing, sore throat, tinnitus, trouble swallowing and voice change.    Eyes: Negative for photophobia, pain, discharge, redness, itching and visual disturbance.   Respiratory: Negative for apnea, cough, choking, chest tightness, shortness of breath, wheezing and stridor.    Cardiovascular: Negative for chest pain, palpitations and leg swelling.   Gastrointestinal: Negative for abdominal distention, abdominal pain, anal bleeding, blood in stool, constipation, diarrhea, nausea, rectal pain and vomiting.   Endocrine: Negative for cold intolerance, heat intolerance, polydipsia, polyphagia and polyuria.   Genitourinary: Negative for decreased urine volume, difficulty urinating, dysuria, enuresis, flank pain, frequency, genital sores, hematuria and  "urgency.   Musculoskeletal: Negative for arthralgias, back pain, gait problem, joint swelling, myalgias, neck pain and neck stiffness.   Skin: Negative for color change, pallor, rash and wound.   Allergic/Immunologic: Negative for environmental allergies, food allergies and immunocompromised state.   Neurological: Negative for dizziness, tremors, seizures, syncope, facial asymmetry, speech difficulty, weakness, light-headedness, numbness and headaches.   Hematological: Negative for adenopathy. Does not bruise/bleed easily.   Psychiatric/Behavioral: Negative for agitation, behavioral problems, confusion, decreased concentration, dysphoric mood, hallucinations, self-injury, sleep disturbance and suicidal ideas. The patient is not nervous/anxious and is not hyperactive.    All other systems reviewed and are negative.      The patient's past medical history, past surgical history, family history, and social history have been reviewed at length in the electronic medical record.    Physical Exam:   Temp 97.8 °F (36.6 °C) (Infrared)   Resp 15   Ht 172.7 cm (68\")   Wt 112 kg (247 lb)   BMI 37.56 kg/m²   External rotation of the right hip produces some trochanteric pain on the right side but there is no tenderness over the trochanter.  Straight leg raising is negative.  Sensation is intact light touch testing.  He continues to ambulate with a cane.    Medical Decision Making    Diagnosis:   Lumbar spinal stenosis status post decompression.  It is possible that the patient has some degree of right hip dysfunction.    Treatment Options:   Given that he was doing better and is now worse again I am going to check a follow-up MRI of the lumbar spine with and without gadolinium.  If this study is unremarkable then we may have to take a look at his right hip.       Diagnosis Plan   1. Status post lumbar surgery  MRI Lumbar Spine With & Without Contrast   2. Spinal stenosis of lumbar region with neurogenic claudication   "       Scribed for Paulie Daniels MD by Anusha Boudreaux CMA on 8/11/2020 09:56       I, Dr. Daniels, personally performed the services described in the documentation, as scribed in my presence, and it is both accurate and complete.

## 2020-08-17 ENCOUNTER — HOSPITAL ENCOUNTER (OUTPATIENT)
Dept: MRI IMAGING | Facility: HOSPITAL | Age: 75
Discharge: HOME | End: 2020-08-17
Admitting: NEUROLOGICAL SURGERY

## 2020-08-17 DIAGNOSIS — Z98.890 STATUS POST LUMBAR SURGERY: ICD-10-CM

## 2020-08-17 PROCEDURE — A9577 INJ MULTIHANCE: HCPCS | Performed by: NEUROLOGICAL SURGERY

## 2020-08-17 PROCEDURE — 0 GADOBENATE DIMEGLUMINE 529 MG/ML SOLUTION: Performed by: NEUROLOGICAL SURGERY

## 2020-08-17 PROCEDURE — 72158 MRI LUMBAR SPINE W/O & W/DYE: CPT

## 2020-08-17 RX ORDER — ALLOPURINOL 100 MG/1
TABLET ORAL
Qty: 30 TABLET | Refills: 2 | Status: SHIPPED | OUTPATIENT
Start: 2020-08-17 | End: 2020-11-16

## 2020-08-17 RX ADMIN — GADOBENATE DIMEGLUMINE 7 ML: 529 INJECTION, SOLUTION INTRAVENOUS at 16:07

## 2020-08-17 RX ADMIN — GADOBENATE DIMEGLUMINE 15 ML: 529 INJECTION, SOLUTION INTRAVENOUS at 16:08

## 2020-08-25 ENCOUNTER — OFFICE VISIT (OUTPATIENT)
Dept: NEUROSURGERY | Facility: CLINIC | Age: 75
End: 2020-08-25

## 2020-08-25 VITALS — BODY MASS INDEX: 37.13 KG/M2 | TEMPERATURE: 98.2 F | WEIGHT: 245 LBS | RESPIRATION RATE: 16 BRPM | HEIGHT: 68 IN

## 2020-08-25 DIAGNOSIS — Z98.890 STATUS POST LUMBAR SURGERY: ICD-10-CM

## 2020-08-25 DIAGNOSIS — M25.551 HIP PAIN, RIGHT: Primary | ICD-10-CM

## 2020-08-25 DIAGNOSIS — M48.062 SPINAL STENOSIS OF LUMBAR REGION WITH NEUROGENIC CLAUDICATION: ICD-10-CM

## 2020-08-25 DIAGNOSIS — M43.16 SPONDYLOLISTHESIS OF LUMBAR REGION: ICD-10-CM

## 2020-08-25 PROCEDURE — 99024 POSTOP FOLLOW-UP VISIT: CPT | Performed by: NEUROLOGICAL SURGERY

## 2020-08-25 NOTE — PROGRESS NOTES
Patient: John Leyva  : 1945    Primary Care Provider: Mahesh Almanza MD    Requesting Provider: As above        History    Chief Complaint: Lumbar stenosis with neurogenic claudication.    History of Present Illness: Mr. Leyva is a 75-year-old gentleman who presented with back and lower extremity pain with walking and standing intolerance.  He was noted to have generous lumbar spinal stenosis and on 2020 he underwent decompressive laminectomies at L3-4 and L4-5.  Prior to discharge in the hospital he was doing well.  He was seen 3 weeks postop by Ms. Alejandra.  He was doing well except for  pain on the side of the right knee.  He indicates that he since then he has had a downhill course with increasing pain in his right hip and right lateral thigh that occurs with standing or walking.  He feels that his right foot is turning out more.  Some of the symptoms were present in his upper thigh even before surgery.  He did physical therapy prior to surgery and that really did not help with those symptoms.    Review of Systems   Constitutional: Negative for activity change, appetite change, chills, diaphoresis, fatigue, fever and unexpected weight change.   HENT: Negative for congestion, dental problem, drooling, ear discharge, ear pain, facial swelling, hearing loss, mouth sores, nosebleeds, postnasal drip, rhinorrhea, sinus pressure, sneezing, sore throat, tinnitus, trouble swallowing and voice change.    Eyes: Negative for photophobia, pain, discharge, redness, itching and visual disturbance.   Respiratory: Negative for apnea, cough, choking, chest tightness, shortness of breath, wheezing and stridor.    Cardiovascular: Negative for chest pain, palpitations and leg swelling.   Gastrointestinal: Negative for abdominal distention, abdominal pain, anal bleeding, blood in stool, constipation, diarrhea, nausea, rectal pain and vomiting.   Endocrine: Negative for cold intolerance, heat intolerance, polydipsia,  "polyphagia and polyuria.   Genitourinary: Negative for decreased urine volume, difficulty urinating, dysuria, enuresis, flank pain, frequency, genital sores, hematuria and urgency.   Musculoskeletal: Positive for back pain. Negative for arthralgias, gait problem, joint swelling, myalgias, neck pain and neck stiffness.   Skin: Negative for color change, pallor, rash and wound.   Allergic/Immunologic: Negative for environmental allergies, food allergies and immunocompromised state.   Neurological: Negative for dizziness, tremors, seizures, syncope, facial asymmetry, speech difficulty, weakness, light-headedness, numbness and headaches.   Hematological: Negative for adenopathy. Does not bruise/bleed easily.   Psychiatric/Behavioral: Negative for agitation, behavioral problems, confusion, decreased concentration, dysphoric mood, hallucinations, self-injury, sleep disturbance and suicidal ideas. The patient is not nervous/anxious and is not hyperactive.    All other systems reviewed and are negative.      The patient's past medical history, past surgical history, family history, and social history have been reviewed at length in the electronic medical record.    Physical Exam:   Temp 98.2 °F (36.8 °C) (Infrared)   Resp 16   Ht 172.7 cm (68\")   Wt 111 kg (245 lb)   BMI 37.25 kg/m²   MUSCULOSKELETAL:  Straight leg raising is negative.  Quinton's Sign is still fairly positive on the right.  Tenderness in the back to palpation is not observed.  NEUROLOGICAL:  Strength is intact in the lower extremities to direct testing.  Muscle tone is normal throughout.  Station and gait are normal.  Sensation is intact to light touch testing throughout.      Medical Decision Making    Data Review:   Follow-up MRI of the lumbar spine demonstrates the very low-grade offset of L4 on L5 that was present previously.  Modest bilateral joint effusions are noted at that level.  His decompression is excellent.  Decompression has been performed " L3-4 as well there remains just a bit of side to side mild stenosis.  But overall that level looks good as well.  I do not see anything that would readily explain hip and thigh symptoms.    Diagnosis:   1.  Possible right hip dysfunction.  2.  History of lumbar stenosis status post decompression.    Treatment Options:   I am going to refer the patient for an MRI of the right hip.  He will follow-up thereafter.  If there are not significant hip findings then we may want to once again consider physical therapy.       Diagnosis Plan   1. Hip pain, right  MRI hip right wo contrast   2. Status post lumbar surgery     3. Spinal stenosis of lumbar region with neurogenic claudication     4. Spondylolisthesis of lumbar region         Scribed for Paulie Daniels MD by Anusha Boudreaux CMA on 8/25/2020 12:22       I, Dr. Daniels, personally performed the services described in the documentation, as scribed in my presence, and it is both accurate and complete.

## 2020-09-04 ENCOUNTER — HOSPITAL ENCOUNTER (OUTPATIENT)
Dept: MRI IMAGING | Facility: HOSPITAL | Age: 75
Discharge: HOME | End: 2020-09-04
Admitting: NEUROLOGICAL SURGERY

## 2020-09-04 DIAGNOSIS — M25.551 HIP PAIN, RIGHT: ICD-10-CM

## 2020-09-04 PROCEDURE — 73721 MRI JNT OF LWR EXTRE W/O DYE: CPT

## 2020-09-08 ENCOUNTER — OFFICE VISIT (OUTPATIENT)
Dept: NEUROSURGERY | Facility: CLINIC | Age: 75
End: 2020-09-08

## 2020-09-08 VITALS — TEMPERATURE: 97.8 F | WEIGHT: 245 LBS | HEIGHT: 68 IN | RESPIRATION RATE: 14 BRPM | BODY MASS INDEX: 37.13 KG/M2

## 2020-09-08 DIAGNOSIS — M16.11 ARTHRITIS OF RIGHT HIP: Primary | ICD-10-CM

## 2020-09-08 DIAGNOSIS — M48.062 SPINAL STENOSIS OF LUMBAR REGION WITH NEUROGENIC CLAUDICATION: ICD-10-CM

## 2020-09-08 DIAGNOSIS — Z98.890 STATUS POST LUMBAR SURGERY: ICD-10-CM

## 2020-09-08 PROCEDURE — 99213 OFFICE O/P EST LOW 20 MIN: CPT | Performed by: NEUROLOGICAL SURGERY

## 2020-09-08 NOTE — PROGRESS NOTES
Patient: John Leyva  : 1945    Primary Care Provider: Mahesh Almanza MD    Requesting Provider: As above        History    Chief Complaint: Lumbar stenosis with neurogenic claudication.    History of Present Illness: Mr. Leyva is a 75-year-old gentleman who presented with back and lower extremity pain with walking and standing intolerance.  He was noted to have generous lumbar spinal stenosis and on 2020 he underwent decompressive laminectomies at L3-4 and L4-5.  Prior to discharge in the hospital he was doing well.  He was seen 3 weeks postop by Ms. Alejandra.  He was doing well except for  pain on the side of the right knee.  He indicates that he since then he has had a downhill course with increasing pain in his right hip and right lateral thigh that occurs with standing or walking.  He feels that his right foot is turning out more.  Some of the symptoms were present in his upper thigh even before surgery.  He did physical therapy prior to surgery and that really did not help with those symptoms.       Review of Systems   Constitutional: Negative for activity change, appetite change, chills, diaphoresis, fatigue, fever and unexpected weight change.   HENT: Negative for congestion, dental problem, drooling, ear discharge, ear pain, facial swelling, hearing loss, mouth sores, nosebleeds, postnasal drip, rhinorrhea, sinus pressure, sneezing, sore throat, tinnitus, trouble swallowing and voice change.    Eyes: Negative for photophobia, pain, discharge, redness, itching and visual disturbance.   Respiratory: Negative for apnea, cough, choking, chest tightness, shortness of breath, wheezing and stridor.    Cardiovascular: Negative for chest pain, palpitations and leg swelling.   Gastrointestinal: Negative for abdominal distention, abdominal pain, anal bleeding, blood in stool, constipation, diarrhea, nausea, rectal pain and vomiting.   Endocrine: Negative for cold intolerance, heat intolerance, polydipsia,  "polyphagia and polyuria.   Genitourinary: Negative for decreased urine volume, difficulty urinating, dysuria, enuresis, flank pain, frequency, genital sores, hematuria and urgency.   Musculoskeletal: Positive for arthralgias and back pain. Negative for gait problem, joint swelling, myalgias, neck pain and neck stiffness.   Skin: Negative for color change, pallor, rash and wound.   Allergic/Immunologic: Negative for environmental allergies, food allergies and immunocompromised state.   Neurological: Negative for dizziness, tremors, seizures, syncope, facial asymmetry, speech difficulty, weakness, light-headedness, numbness and headaches.   Hematological: Negative for adenopathy. Does not bruise/bleed easily.   Psychiatric/Behavioral: Negative for agitation, behavioral problems, confusion, decreased concentration, dysphoric mood, hallucinations, self-injury, sleep disturbance and suicidal ideas. The patient is not nervous/anxious and is not hyperactive.    All other systems reviewed and are negative.      The patient's past medical history, past surgical history, family history, and social history have been reviewed at length in the electronic medical record.    Physical Exam:   Temp 97.8 °F (36.6 °C) (Infrared)   Resp 14   Ht 172.7 cm (68\")   Wt 111 kg (245 lb)   BMI 37.25 kg/m²   MUSCULOSKELETAL:  Straight leg raising is negative.  Quinton's Sign is fairly positive on the right.  Tenderness in the back to palpation is not observed.  NEUROLOGICAL:  Strength is intact in the lower extremities to direct testing.  Muscle tone is normal throughout.  Station and gait are somewhat unsteady and he utilizes a cane.  Sensation is intact to light touch testing throughout.      Medical Decision Making    Data Review:   Follow-up MRI of the lumbar spine demonstrates the very low-grade offset of L4 on L5 that was present previously.  Modest bilateral joint effusions are noted at that level.  His decompression is excellent.  " Decompression has been performed L3-4 as well there remains just a bit of side to side mild stenosis.  But overall that level looks good as well.  I do not see anything that would readily explain hip and thigh symptoms.    MRI of the right hip demonstrates advanced degenerative change within the right hip.  Changes involve the right acetabular labrum.  There is mild right trochanteric bursitis.  There is also a partial tear of the hamstring tendon from the right ischial tuberosity.    Diagnosis:   1.  Lumbar stenosis status post decompression.  2.  Right pelvis/hip dysfunction.    Treatment Options:   I am going to refer the patient on to orthopedics to evaluate some of the above-noted findings involving his right hip.  Treatment for his residual back difficulties will need to be symptomatic.       Diagnosis Plan   1. Arthritis of right hip  Ambulatory Referral to Orthopedic Surgery   2. Status post lumbar surgery     3. Spinal stenosis of lumbar region with neurogenic claudication         Scribed for Paulie Daniels MD by Anusha Boudreaux CMA on 9/8/2020 13:02       I, Dr. Daniels, personally performed the services described in the documentation, as scribed in my presence, and it is both accurate and complete.

## 2020-09-13 DIAGNOSIS — N40.0 BENIGN NON-NODULAR PROSTATIC HYPERPLASIA: ICD-10-CM

## 2020-09-14 ENCOUNTER — HOSPITAL ENCOUNTER (OUTPATIENT)
Dept: MRI IMAGING | Facility: HOSPITAL | Age: 75
End: 2020-09-14

## 2020-09-14 RX ORDER — FINASTERIDE 5 MG/1
TABLET, FILM COATED ORAL
Qty: 90 TABLET | Refills: 2 | Status: SHIPPED | OUTPATIENT
Start: 2020-09-14 | End: 2020-09-15 | Stop reason: SDUPTHER

## 2020-09-15 DIAGNOSIS — N40.0 BENIGN NON-NODULAR PROSTATIC HYPERPLASIA: ICD-10-CM

## 2020-09-15 RX ORDER — FINASTERIDE 5 MG/1
5 TABLET, FILM COATED ORAL DAILY
Qty: 90 TABLET | Refills: 2 | Status: SHIPPED | OUTPATIENT
Start: 2020-09-15 | End: 2020-10-13 | Stop reason: SDUPTHER

## 2020-09-19 RX ORDER — ATORVASTATIN CALCIUM 20 MG/1
TABLET, FILM COATED ORAL
Qty: 30 TABLET | Refills: 0 | Status: SHIPPED | OUTPATIENT
Start: 2020-09-19 | End: 2020-10-26

## 2020-10-12 ENCOUNTER — OFFICE VISIT (OUTPATIENT)
Dept: ORTHOPEDIC SURGERY | Facility: CLINIC | Age: 75
End: 2020-10-12

## 2020-10-12 VITALS — BODY MASS INDEX: 38.19 KG/M2 | RESPIRATION RATE: 18 BRPM | WEIGHT: 252 LBS | HEIGHT: 68 IN

## 2020-10-12 DIAGNOSIS — M16.11 PRIMARY OSTEOARTHRITIS OF RIGHT HIP: ICD-10-CM

## 2020-10-12 DIAGNOSIS — M25.551 ARTHRALGIA OF RIGHT HIP: Primary | ICD-10-CM

## 2020-10-12 PROCEDURE — 99203 OFFICE O/P NEW LOW 30 MIN: CPT | Performed by: ORTHOPAEDIC SURGERY

## 2020-10-12 NOTE — PROGRESS NOTES
Subjective   Patient ID: John Leyva is a 75 y.o.   male referred by Dr. Paulie Daniels and is being seen for orthopaedic evaluation today for right hip pain  Pain of the Right Hip       CHIEF COMPLAINT:     Right hip pain    History of Present Illness    Right Hip Pain  Patient complains of right hip pain. Onset of the symptoms is chronic with worsening right hip pain the past year. Patient states he also had low back pain, left hip and left leg pain for about 10 years, with numbness and tingling of both feet, in particular with ambulation.  He states symptoms did improve on the left about a year ago and about that time the right hip and leg pain worsened.  No injury or trauma reported. The patient reports his right hip pain is aggravated with weight bearing and sleeping on right side.  Patient states he uses a cane to walk long distances and on uneven ground.  He states he can walk and do activities for about 10 minutes before having hip pain and limitations requiring a cane to continue.  Aggravating symptoms include: any weight bearing, lateral movements, pivoting, rising after sitting and walking. Prior treatment for his condition:  Last seen 9/8/20 by Dr Daniels, his lumbar spine specialist. Treatment included lumbar procedure 6-4-2020: lumbar laminectomy L4-5, and hemilaminectomy L3-4 on right. Also, physical therapy.  Also, OTC analgesics, which have been not very effective.  Studies: MRI right hip ordered by Dr. Daniels, performed at [facility] performed 9-4-2020:   RADIOLOGIST IMPRESSION:  1. Advanced degenerative disease of the right hip.  2. Degenerative changes to the right acetabular labrum.  3. Mild right trochanteric bursitis.  4. Partial tear of the common hamstring tendon from the right ischial tuberosity.     Patient presents referred by MITUL Daniels MD specifically for evaluation and management of the patient's right hip joint painful osteoarthritis condition.    Past Medical History:   Diagnosis Date   •  BPH (benign prostatic hyperplasia)    • Diabetes mellitus (CMS/HCC)     no medications    • ED (erectile dysfunction)    • Elevated PSA    • Gout    • Hypertension    • Hypogonadism in male    • Hypothyroidism     no medications   • Low kidney function    • Wears glasses         Past Surgical History:   Procedure Laterality Date   • COLONOSCOPY     • ELBOW OLECRANNON BURSA EXCISION Right    • EYE SURGERY Bilateral     cataracts removed   • LUMBAR LAMINECTOMY DISCECTOMY DECOMPRESSION Right 2020    Procedure: LUMBAR LAMINECTOMY L4-5, HEMILAMINECTOMY L3-4 RIGHT;  Surgeon: Paulie Daniels MD;  Location: [facility];  Service: Neurosurgery;  Laterality: Right;   • RETINAL DETACHMENT SURGERY      x3 surgeries       Family History   Problem Relation Age of Onset   • Cancer Other    • Cancer Mother    • Cancer Father         Bone cancer        Social History     Socioeconomic History   • Marital status:      Spouse name: Yolanda   • Number of children: Not on file   • Years of education: Not on file   • Highest education level: Not on file   Occupational History   • Occupation: [employer]     Employer: RETIRED   Tobacco Use   • Smoking status: Former Smoker     Quit date:      Years since quittin.8   • Smokeless tobacco: Never Used   Substance and Sexual Activity   • Alcohol use: No   • Drug use: No   • Sexual activity: Defer   Social History Narrative    Lives in [place] with wife    Right hand dominant       No Known Allergies    Review of Systems   Constitutional: Negative for fever.   HENT: Negative for dental problem and voice change.    Eyes: Negative for visual disturbance.   Respiratory: Negative for shortness of breath.    Cardiovascular: Negative for chest pain.   Gastrointestinal: Negative for abdominal pain.   Genitourinary: Negative for dysuria.   Musculoskeletal: Positive for arthralgias and gait problem (uses cane for stability when walking long distances and on uneven ground).  "Negative for joint swelling.   Skin: Negative for rash.   Neurological: Negative for speech difficulty.   Hematological: Bruises/bleeds easily.   Psychiatric/Behavioral: Negative for confusion.     I have reviewed the medical and surgical history, family history, social history, medications, and/or allergies, and the review of systems of this report.    Objective   Resp 18   Ht 172.7 cm (68\")   Wt 114 kg (252 lb)   BMI 38.32 kg/m²   Physical Exam  Vitals signs reviewed.   Constitutional:       General: He is not in acute distress.     Appearance: He is well-developed.   Skin:     General: Skin is warm and dry.      Findings: No erythema or rash.   Neurological:      Mental Status: He is alert.   Psychiatric:         Speech: Speech normal.       Right Hip Exam     Tenderness   The patient is experiencing tenderness in the anterior and posterior.    Range of Motion   Abduction: 30   Flexion: 100   External rotation: 20   Internal rotation: 10     Muscle Strength   Abduction: 4/5   Adduction: 5/5   Flexion: 5/5     Tests   JANY: negative  Fadir:  Positive FADIR test    Other   Erythema: absent  Pulse: present      Back Exam     Tenderness   The patient is experiencing tenderness in the lumbar and sacroiliac.    Tests   Straight leg raise right: negative  Straight leg raise left: negative        Extremity DVT signs are negative on physical exam with negative Gale sign, no calf pain, no palpable cords and no skin tone change   Neurologic Exam     Mental Status   Attention: normal.   Speech: speech is normal   Level of consciousness: alert  Knowledge: good.     Motor Exam   Overall muscle tone: normal    Gait, Coordination, and Reflexes     Gait  Gait: (mild right hip antalgic limp)       Assessment/Plan     Independent Review of Radiographic Studies:    Reviewed images and agree with radiologist interpretation.    Laboratory and Other Studies:  No new results reviewed today.     Medical Decision Making:    Limited " progress with persistent or worsening symptoms of right hip osteoarthritis.   Continue care plan with any additional work-up and treatment as outlined below.  As a next step and for diagnostic and therapeutic purposes, recommend a right hip joint corticosteroid injection, image guided.    Procedures    Diagnoses and all orders for this visit:    1. Arthralgia of right hip (Primary)  -     FL Guided Pain Management Large Joint    2. Primary osteoarthritis of right hip       Discussion of orthopaedic goals and activities and patient and/or guardian expressed appreciation.  Risk, benefits, and merits of treatment alternatives reviewed with the patient and/or guardian and questions answered  Regular exercise as tolerated  Guided on proper techniques for mobility, strength, agility and/or conditioning exercises  Ice, heat, and/or modalities as beneficial  Reduced physical activity as appropriate and avoid offending activity  Take prescribed medications as instructed only as tolerated    Recommendations/Plan:  Exercise, medications, injections, other patient advice, and return appointment as noted.  Brace: No brace was given at today's visit.  Referral: No referrals made at today's visit.  Test/Studies: Right hip joint fluoro guided injection.  Surgery: No surgery proposed at this visit. For intractable painful limiting condition, patient to consider elective right total hip replacement.  Work/Activity Status: Usual activities, routine exercise as tolerated, no strenuous activity.    Return in about 3 months (around 1/12/2021) for Recheck.  Patient is encouraged and agreeable to call or return sooner for any issues or concerns.      Portions of this note have been scribed for Jose Carlson MD by Susanna Clifton CMA

## 2020-10-13 ENCOUNTER — OFFICE VISIT (OUTPATIENT)
Dept: UROLOGY | Facility: CLINIC | Age: 75
End: 2020-10-13

## 2020-10-13 VITALS
WEIGHT: 252 LBS | SYSTOLIC BLOOD PRESSURE: 136 MMHG | BODY MASS INDEX: 38.32 KG/M2 | HEART RATE: 83 BPM | TEMPERATURE: 97.7 F | RESPIRATION RATE: 16 BRPM | OXYGEN SATURATION: 97 % | DIASTOLIC BLOOD PRESSURE: 86 MMHG

## 2020-10-13 DIAGNOSIS — N40.0 BENIGN NON-NODULAR PROSTATIC HYPERPLASIA: ICD-10-CM

## 2020-10-13 DIAGNOSIS — R97.20 ELEVATED PROSTATE SPECIFIC ANTIGEN (PSA): ICD-10-CM

## 2020-10-13 DIAGNOSIS — N40.1 BPH WITH OBSTRUCTION/LOWER URINARY TRACT SYMPTOMS: Primary | ICD-10-CM

## 2020-10-13 DIAGNOSIS — N13.8 BPH WITH OBSTRUCTION/LOWER URINARY TRACT SYMPTOMS: Primary | ICD-10-CM

## 2020-10-13 LAB
BILIRUB BLD-MCNC: NEGATIVE MG/DL
CLARITY, POC: CLEAR
COLOR UR: YELLOW
GLUCOSE UR STRIP-MCNC: NEGATIVE MG/DL
KETONES UR QL: NEGATIVE
LEUKOCYTE EST, POC: NEGATIVE
NITRITE UR-MCNC: NEGATIVE MG/ML
PH UR: 6 [PH] (ref 5–8)
PROT UR STRIP-MCNC: NEGATIVE MG/DL
RBC # UR STRIP: NEGATIVE /UL
SP GR UR: 1.01 (ref 1–1.03)
UROBILINOGEN UR QL: NORMAL

## 2020-10-13 PROCEDURE — 81003 URINALYSIS AUTO W/O SCOPE: CPT | Performed by: UROLOGY

## 2020-10-13 PROCEDURE — 99213 OFFICE O/P EST LOW 20 MIN: CPT | Performed by: UROLOGY

## 2020-10-13 RX ORDER — FINASTERIDE 5 MG/1
5 TABLET, FILM COATED ORAL DAILY
Qty: 90 TABLET | Refills: 2 | Status: SHIPPED | OUTPATIENT
Start: 2020-10-13 | End: 2021-10-28

## 2020-10-13 NOTE — PROGRESS NOTES
Chief Complaint  Benign Prostatic Hypertrophy (yearly)        NATHALY Leyva is a 75 y.o. male who returns today for an annual checkup generally doing well.  He has had back surgery since his last visit continues to have knee discomfort and is facing a possible right hip replacement by Dr. Phillips.  Currently has mild difficulty voiding only describing an AUA index of 17 on Cardura and Proscar.  He recently had an elevated PSA but it is been normal for years.  Currently his main urinary symptom is frequency and urgency after drinking multiple large cups of caffeinated beverage in the mornings.    Vitals:    10/13/20 1022   BP: 136/86   Pulse: 83   Resp: 16   Temp: 97.7 °F (36.5 °C)   SpO2: 97%       Past Medical History  Past Medical History:   Diagnosis Date   • BPH (benign prostatic hyperplasia)    • Diabetes mellitus (CMS/HCC)     no medications    • ED (erectile dysfunction)    • Elevated PSA    • Gout    • Hypertension    • Hypogonadism in male    • Hypothyroidism     no medications   • Low kidney function    • Wears glasses        Past Surgical History  Past Surgical History:   Procedure Laterality Date   • COLONOSCOPY     • ELBOW OLECRANNON BURSA EXCISION Right    • EYE SURGERY Bilateral     cataracts removed   • LUMBAR LAMINECTOMY DISCECTOMY DECOMPRESSION Right 6/4/2020    Procedure: LUMBAR LAMINECTOMY L4-5, HEMILAMINECTOMY L3-4 RIGHT;  Surgeon: Paulie Daniels MD;  Location: [facility];  Service: Neurosurgery;  Laterality: Right;   • RETINAL DETACHMENT SURGERY      x3 surgeries       Medications  has a current medication list which includes the following prescription(s): allopurinol, amlodipine, atorvastatin, doxazosin, duloxetine, finasteride, lisinopril-hydrochlorothiazide, metoprolol succinate xl, and multiple vitamins-minerals.      Allergies  No Known Allergies    Social History  Social History     Socioeconomic History   • Marital status:      Spouse name: Yolanda   • Number of children: Not on file    • Years of education: Not on file   • Highest education level: Not on file   Occupational History   • Occupation: [employer]     Employer: RETIRED   Tobacco Use   • Smoking status: Former Smoker     Quit date:      Years since quittin.8   • Smokeless tobacco: Never Used   Substance and Sexual Activity   • Alcohol use: No   • Drug use: No   • Sexual activity: Defer   Social History Narrative    Lives in [place] with wife    Right hand dominant       Family History  He has no family history of bladder or kidney cancer  He has no family history of kidney stones      AUA Symptom Score:      Review of Systems  Review of Systems   Constitutional: Negative for activity change, appetite change, chills, fatigue, fever, unexpected weight gain and unexpected weight loss.   Respiratory: Negative for apnea, cough, chest tightness, shortness of breath, wheezing and stridor.    Cardiovascular: Negative for chest pain, palpitations and leg swelling.   Gastrointestinal: Negative for abdominal distention, abdominal pain, anal bleeding, blood in stool, constipation, diarrhea, nausea, rectal pain, vomiting, GERD and indigestion.   Genitourinary: Positive for difficulty urinating, frequency and urgency. Negative for decreased libido, decreased urine volume, discharge, dysuria, flank pain, genital sores, hematuria, nocturia, penile pain, erectile dysfunction, penile swelling, scrotal swelling, testicular pain and urinary incontinence.   Musculoskeletal: Negative for back pain and joint swelling.   Neurological: Negative for tremors, seizures, speech difficulty, weakness and numbness.   Psychiatric/Behavioral: Negative for agitation, decreased concentration, sleep disturbance, depressed mood and stress. The patient is not nervous/anxious.        Physical Exam  Physical Exam  Vitals signs reviewed.   Constitutional:       Appearance: He is well-developed.   HENT:      Head: Normocephalic and atraumatic.   Neck:       Musculoskeletal: Normal range of motion.   Pulmonary:      Effort: Pulmonary effort is normal. No respiratory distress.   Abdominal:      General: There is no distension.      Palpations: Abdomen is soft. There is no mass.      Tenderness: There is no abdominal tenderness.      Hernia: No hernia is present.   Genitourinary:      Musculoskeletal: Normal range of motion.   Lymphadenopathy:      Cervical: No cervical adenopathy.   Skin:     General: Skin is warm and dry.   Neurological:      Mental Status: He is alert and oriented to person, place, and time.   Psychiatric:         Behavior: Behavior normal.         Labs Recent and today in the office:  Results for orders placed or performed during the hospital encounter of 06/04/20   POC Glucose Once    Specimen: Blood   Result Value Ref Range    Glucose 113 70 - 130 mg/dL   POC Glucose Once    Specimen: Blood   Result Value Ref Range    Glucose 135 (H) 70 - 130 mg/dL         Assessment & Plan  BPH with bladder outlet obstruction/elevated PSA: Digital rectal exam reveals an enlarged nonsuspicious prostate and PSA is repeated today.  If normal he can return on an annual basis.  He will continue his Proscar and Cardura.  If there is a significant progression of his prostate in the future I would recommend prostate biopsy.

## 2020-10-14 LAB — PSA SERPL-MCNC: 1.13 NG/ML (ref 0–4)

## 2020-10-16 ENCOUNTER — HOSPITAL ENCOUNTER (OUTPATIENT)
Dept: GENERAL RADIOLOGY | Facility: HOSPITAL | Age: 75
Discharge: HOME | End: 2020-10-16
Admitting: ORTHOPAEDIC SURGERY

## 2020-10-16 PROCEDURE — 25010000003 LIDOCAINE 1 % SOLUTION: Performed by: ORTHOPAEDIC SURGERY

## 2020-10-16 PROCEDURE — 77002 NEEDLE LOCALIZATION BY XRAY: CPT

## 2020-10-16 PROCEDURE — 20610 DRAIN/INJ JOINT/BURSA W/O US: CPT | Performed by: ORTHOPAEDIC SURGERY

## 2020-10-16 PROCEDURE — 77002 NEEDLE LOCALIZATION BY XRAY: CPT | Performed by: ORTHOPAEDIC SURGERY

## 2020-10-16 RX ORDER — METHYLPREDNISOLONE ACETATE 80 MG/ML
80 INJECTION, SUSPENSION INTRA-ARTICULAR; INTRALESIONAL; INTRAMUSCULAR; SOFT TISSUE ONCE
Status: DISCONTINUED | OUTPATIENT
Start: 2020-10-16 | End: 2020-10-17 | Stop reason: HOSPADM

## 2020-10-16 RX ORDER — LIDOCAINE HYDROCHLORIDE 10 MG/ML
10 INJECTION, SOLUTION INFILTRATION; PERINEURAL ONCE
Status: COMPLETED | OUTPATIENT
Start: 2020-10-16 | End: 2020-10-16

## 2020-10-16 RX ADMIN — LIDOCAINE HYDROCHLORIDE 9 ML: 10 INJECTION, SOLUTION INFILTRATION; PERINEURAL at 14:44

## 2020-10-16 NOTE — POST-PROCEDURE NOTE
[facility]  [address], [address]  [place] [address]  [phone]        PROCEDURE REPORT        DIAGNOSIS:  Right hip osteoarthritis, symptomatic    PROCEDURE: Right  hip injection under flouroscopy      John Leyva with date of birth 1945 presents to [facility] Radiology Department today for injection therapy.        Patient presents to [facility] Radiology Department Flouroscopy Suite on 10/16/2020 for planned elective right hip injection under flouroscopy for symptomatic osteoarthritis.    Procedure:     After consent was obtained, and using ethyl chloride topical local anesthetic, the right hip was then prepped and draped with sterile technique. With an anterior hip approach, flouroscopy guidance, and care to stay lateral of the femoral artery, the hip joint was entered via a 20 gauge spinal needle.  A mixture of 80 mg methylprednisolone in one ml plus 9 ml of 1% plain Lidocaine was injected and the needle withdrawn. The procedure was well tolerated and without complication. The patient noted relief of focal hip joint pain.  The patient did remain stable and with baseline ambulation. The patient is asked to rest the joint for a few more days before resuming full regular activities. It may be painful for the first few days. Watch for fever, skin issues, increased swelling or persistent pain in the joint. Call or return to clinic if such symptoms occur, other concerns or if there is lack of improvement as anticipated.    Impression: Symptomatic right hip osteoarthritis.      Recommendations/Plan:      Treatment and patient advice as noted here and in office visit report.  Orthopedic activities reviewed and patient expressed appreciation.  Discussion of orthopedic goals.   Risk, benefits, and merits of treatment options reviewed and questions answered.  Call or notify for any adverse effect from injection therapy.    Exercise: As tolerated.  No strenuous activity  for a few days as appropriate.  Brace:  No brace was given at today's visit  Referral: No referrals made at today's visit  Studies: No additional studies ordered.  Surgery: No surgery proposed at this visit.  Activity:  May perform usual activities as tolerated.      Patient will return to our clinic at scheduled appointment.  Patient agreeable to call or return sooner for any concerns.

## 2020-10-26 RX ORDER — ATORVASTATIN CALCIUM 20 MG/1
TABLET, FILM COATED ORAL
Qty: 90 TABLET | Refills: 3 | Status: SHIPPED | OUTPATIENT
Start: 2020-10-26 | End: 2021-10-15

## 2020-11-16 RX ORDER — ALLOPURINOL 100 MG/1
TABLET ORAL
Qty: 90 TABLET | Refills: 3 | Status: SHIPPED | OUTPATIENT
Start: 2020-11-16 | End: 2021-11-22

## 2020-12-11 RX ORDER — LISINOPRIL AND HYDROCHLOROTHIAZIDE 12.5; 1 MG/1; MG/1
1 TABLET ORAL DAILY
Qty: 90 TABLET | Refills: 0 | Status: SHIPPED | OUTPATIENT
Start: 2020-12-11 | End: 2021-03-18

## 2021-01-22 ENCOUNTER — TELEPHONE (OUTPATIENT)
Dept: ORTHOPEDIC SURGERY | Facility: CLINIC | Age: 76
End: 2021-01-22

## 2021-01-22 DIAGNOSIS — M25.551 ARTHRALGIA OF RIGHT HIP: ICD-10-CM

## 2021-01-22 DIAGNOSIS — M16.11 PRIMARY OSTEOARTHRITIS OF RIGHT HIP: Primary | ICD-10-CM

## 2021-02-19 ENCOUNTER — HOSPITAL ENCOUNTER (OUTPATIENT)
Dept: GENERAL RADIOLOGY | Facility: HOSPITAL | Age: 76
Discharge: HOME | End: 2021-02-19
Admitting: ORTHOPAEDIC SURGERY

## 2021-02-19 PROCEDURE — 20610 DRAIN/INJ JOINT/BURSA W/O US: CPT | Performed by: ORTHOPAEDIC SURGERY

## 2021-02-19 PROCEDURE — 25010000003 LIDOCAINE 1 % SOLUTION: Performed by: ORTHOPAEDIC SURGERY

## 2021-02-19 PROCEDURE — 77002 NEEDLE LOCALIZATION BY XRAY: CPT

## 2021-02-19 PROCEDURE — 77002 NEEDLE LOCALIZATION BY XRAY: CPT | Performed by: ORTHOPAEDIC SURGERY

## 2021-02-19 PROCEDURE — 25010000002 METHYLPREDNISOLONE PER 80 MG: Performed by: ORTHOPAEDIC SURGERY

## 2021-02-19 RX ORDER — METHYLPREDNISOLONE ACETATE 80 MG/ML
80 INJECTION, SUSPENSION INTRA-ARTICULAR; INTRALESIONAL; INTRAMUSCULAR; SOFT TISSUE ONCE
Status: COMPLETED | OUTPATIENT
Start: 2021-02-19 | End: 2021-02-19

## 2021-02-19 RX ORDER — LIDOCAINE HYDROCHLORIDE 10 MG/ML
5 INJECTION, SOLUTION INFILTRATION; PERINEURAL ONCE
Status: COMPLETED | OUTPATIENT
Start: 2021-02-19 | End: 2021-02-19

## 2021-02-19 RX ADMIN — LIDOCAINE HYDROCHLORIDE 5 ML: 10 INJECTION, SOLUTION INFILTRATION; PERINEURAL at 14:19

## 2021-02-19 RX ADMIN — METHYLPREDNISOLONE ACETATE 80 MG: 80 INJECTION, SUSPENSION INTRA-ARTICULAR; INTRALESIONAL; INTRAMUSCULAR; SOFT TISSUE at 14:19

## 2021-02-19 NOTE — POST-PROCEDURE NOTE
[facility]  [address], [address]  [place] [address]  [phone]        PROCEDURE REPORT        DIAGNOSIS:  Right hip osteoarthritis, symptomatic    PROCEDURE: Right  hip injection under flouroscopy      John Leyva with date of birth 1945 presents to [facility] Radiology Department today for injection therapy.        Patient presents to [facility] Radiology Department Flouroscopy Suite on 2/19/2021 for planned elective right hip injection under flouroscopy for symptomatic osteoarthritis.    Procedure:     After consent was obtained, and using ethyl chloride topical local anesthetic, the right hip was then prepped and draped with sterile technique. With an anterior hip approach, flouroscopy guidance, and care to stay lateral of the femoral artery, the hip joint was entered via a 20 gauge spinal needle.  A mixture of 80 mg methylprednisolone in one ml plus 5 ml of 1% plain Lidocaine was injected and the needle withdrawn. The procedure was well tolerated and without complication. The patient noted relief of focal hip joint pain.  The patient did remain stable and with baseline ambulation. The patient is asked to rest the joint for a few more days before resuming full regular activities. It may be painful for the first few days. Watch for fever, skin issues, increased swelling or persistent pain in the joint. Call or return to clinic if such symptoms occur, other concerns or if there is lack of improvement as anticipated.    Impression: Symptomatic right hip osteoarthritis.      Recommendations/Plan:      Treatment and patient advice as noted here and in office visit report.  Orthopedic activities reviewed and patient expressed appreciation.  Discussion of orthopedic goals.   Risk, benefits, and merits of treatment options reviewed and questions answered.  Call or notify for any adverse effect from injection therapy.    Exercise: As tolerated.  No strenuous activity  for a few days as appropriate.  Brace:  No brace was given at today's visit  Referral: No referrals made at today's visit  Studies: No additional studies ordered.  Surgery: No surgery proposed at this visit.  Activity:  May perform usual activities as tolerated.      Patient will return to our clinic 3- at 11 am, as he requested a return appointment in late March.  He is considering proceeding with elective right total hip replacement in April, 2021.  Patient agreeable to call or return sooner for any concerns.

## 2021-03-18 RX ORDER — LISINOPRIL AND HYDROCHLOROTHIAZIDE 12.5; 1 MG/1; MG/1
1 TABLET ORAL DAILY
Qty: 90 TABLET | Refills: 0 | Status: SHIPPED | OUTPATIENT
Start: 2021-03-18 | End: 2021-06-21

## 2021-03-29 ENCOUNTER — OFFICE VISIT (OUTPATIENT)
Dept: ORTHOPEDIC SURGERY | Facility: CLINIC | Age: 76
End: 2021-03-29

## 2021-03-29 VITALS — TEMPERATURE: 97.3 F | WEIGHT: 252 LBS | HEIGHT: 68 IN | BODY MASS INDEX: 38.19 KG/M2

## 2021-03-29 DIAGNOSIS — Z01.818 PRE-OP EXAMINATION: ICD-10-CM

## 2021-03-29 DIAGNOSIS — M25.551 ARTHRALGIA OF RIGHT HIP: Primary | ICD-10-CM

## 2021-03-29 DIAGNOSIS — M16.11 PRIMARY OSTEOARTHRITIS OF RIGHT HIP: ICD-10-CM

## 2021-03-29 PROCEDURE — S0260 H&P FOR SURGERY: HCPCS | Performed by: ORTHOPAEDIC SURGERY

## 2021-03-29 PROCEDURE — 73502 X-RAY EXAM HIP UNI 2-3 VIEWS: CPT | Performed by: ORTHOPAEDIC SURGERY

## 2021-03-30 ENCOUNTER — PREP FOR SURGERY (OUTPATIENT)
Dept: OTHER | Facility: HOSPITAL | Age: 76
End: 2021-03-30

## 2021-03-30 DIAGNOSIS — M25.551 ARTHRALGIA OF RIGHT HIP: ICD-10-CM

## 2021-03-30 DIAGNOSIS — M16.11 PRIMARY OSTEOARTHRITIS OF RIGHT HIP: Primary | ICD-10-CM

## 2021-03-30 RX ORDER — CEFAZOLIN SODIUM 2 G/50ML
2 SOLUTION INTRAVENOUS
Status: CANCELLED | OUTPATIENT
Start: 2021-03-31 | End: 2021-04-01

## 2021-04-01 ENCOUNTER — OFFICE VISIT (OUTPATIENT)
Dept: INTERNAL MEDICINE | Facility: CLINIC | Age: 76
End: 2021-04-01

## 2021-04-01 DIAGNOSIS — M10.00 IDIOPATHIC GOUT, UNSPECIFIED CHRONICITY, UNSPECIFIED SITE: ICD-10-CM

## 2021-04-01 DIAGNOSIS — M48.062 SPINAL STENOSIS OF LUMBAR REGION WITH NEUROGENIC CLAUDICATION: ICD-10-CM

## 2021-04-01 DIAGNOSIS — Z00.00 WELLNESS EXAMINATION: ICD-10-CM

## 2021-04-01 DIAGNOSIS — E03.9 HYPOTHYROIDISM, UNSPECIFIED TYPE: ICD-10-CM

## 2021-04-01 DIAGNOSIS — E78.5 HYPERLIPIDEMIA, UNSPECIFIED HYPERLIPIDEMIA TYPE: ICD-10-CM

## 2021-04-01 DIAGNOSIS — M16.11 PRIMARY OSTEOARTHRITIS OF RIGHT HIP: ICD-10-CM

## 2021-04-01 DIAGNOSIS — E55.9 VITAMIN D DEFICIENCY: ICD-10-CM

## 2021-04-01 DIAGNOSIS — E11.9 TYPE 2 DIABETES MELLITUS WITHOUT COMPLICATION, WITHOUT LONG-TERM CURRENT USE OF INSULIN (HCC): Primary | ICD-10-CM

## 2021-04-01 DIAGNOSIS — M25.551 ARTHRALGIA OF RIGHT HIP: ICD-10-CM

## 2021-04-01 DIAGNOSIS — I10 ESSENTIAL HYPERTENSION: ICD-10-CM

## 2021-04-01 DIAGNOSIS — N40.0 BENIGN NON-NODULAR PROSTATIC HYPERPLASIA: ICD-10-CM

## 2021-04-01 DIAGNOSIS — K43.9 VENTRAL HERNIA WITHOUT OBSTRUCTION OR GANGRENE: ICD-10-CM

## 2021-04-01 DIAGNOSIS — L98.9 FACIAL SKIN LESION: ICD-10-CM

## 2021-04-01 LAB
HBA1C MFR BLD: 6.6 %
POC CREATININE URINE: 50
POC MICROALBUMIN URINE: 10

## 2021-04-01 PROCEDURE — 99397 PER PM REEVAL EST PAT 65+ YR: CPT | Performed by: INTERNAL MEDICINE

## 2021-04-01 PROCEDURE — 83036 HEMOGLOBIN GLYCOSYLATED A1C: CPT | Performed by: INTERNAL MEDICINE

## 2021-04-01 PROCEDURE — 1170F FXNL STATUS ASSESSED: CPT | Performed by: INTERNAL MEDICINE

## 2021-04-01 PROCEDURE — 99214 OFFICE O/P EST MOD 30 MIN: CPT | Performed by: INTERNAL MEDICINE

## 2021-04-01 PROCEDURE — 96160 PT-FOCUSED HLTH RISK ASSMT: CPT | Performed by: INTERNAL MEDICINE

## 2021-04-01 PROCEDURE — 82044 UR ALBUMIN SEMIQUANTITATIVE: CPT | Performed by: INTERNAL MEDICINE

## 2021-04-01 PROCEDURE — G0439 PPPS, SUBSEQ VISIT: HCPCS | Performed by: INTERNAL MEDICINE

## 2021-04-01 PROCEDURE — 1125F AMNT PAIN NOTED PAIN PRSNT: CPT | Performed by: INTERNAL MEDICINE

## 2021-04-01 PROCEDURE — 1159F MED LIST DOCD IN RCRD: CPT | Performed by: INTERNAL MEDICINE

## 2021-04-01 NOTE — PROGRESS NOTES
The ABCs of the Annual Wellness Visit  Subsequent Medicare Wellness Visit    Chief Complaint   Patient presents with   • Medicare Wellness-subsequent       Subjective   History of Present Illness:  John Leyva is a 75 y.o. male who presents for a Subsequent Medicare Wellness Visit.  Patient here for follow-up.  Patient also has medical problems to discuss,Medicare wellness and a physical.  Diabetes on medication need to follow-up.  Hyperlipidemia on medication stable.  Blood pressure slightly elevated today.  Hypothyroidism on medication stable vitamin D supplement helps.  Right hip pain achy in nature certain position worse bearing weight worse over-the-counter medicine no help.  Gout stable now.  Need the blood tests.  Spinal stenosis is stable.  Patient complains of left facial lesion.  Not healing.  HEALTH RISK ASSESSMENT    Recent Hospitalizations:  No hospitalization(s) within the last year.    Current Medical Providers:  Patient Care Team:  Mahesh Almanza MD as PCP - General (Internal Medicine)    Smoking Status:  Social History     Tobacco Use   Smoking Status Former Smoker   • Quit date:    • Years since quittin.2   Smokeless Tobacco Never Used       Alcohol Consumption:  Social History     Substance and Sexual Activity   Alcohol Use No       Depression Screen:   PHQ-2/PHQ-9 Depression Screening 2021   Little interest or pleasure in doing things 1   Feeling down, depressed, or hopeless 0   Total Score 1       Fall Risk Screen:  STEADI Fall Risk Assessment was completed, and patient is at HIGH risk for falls. Assessment completed on:2021    Health Habits and Functional and Cognitive Screening:  Functional & Cognitive Status 2021   Do you have difficulty preparing food and eating? No   Do you have difficulty bathing yourself, getting dressed or grooming yourself? No   Do you have difficulty using the toilet? No   Do you have difficulty moving around from place to place? Yes   Do you have  trouble with steps or getting out of a bed or a chair? Yes   Current Diet Well Balanced Diet   Dental Exam Not up to date   Eye Exam Up to date   Exercise (times per week) 0 times per week   Current Exercise Activities Include None   Do you need help using the phone?  No   Are you deaf or do you have serious difficulty hearing?  No   Do you need help with transportation? No   Do you need help shopping? No   Do you need help preparing meals?  No   Do you need help with housework?  No   Do you need help with laundry? No   Do you need help taking your medications? No   Do you need help managing money? No   Do you ever drive or ride in a car without wearing a seat belt? No   Have you felt unusual stress, anger or loneliness in the last month? Yes   Who do you live with? Spouse   If you need help, do you have trouble finding someone available to you? No   Have you been bothered in the last four weeks by sexual problems? No   Do you have difficulty concentrating, remembering or making decisions? No         Does the patient have evidence of cognitive impairment? No    Asprin use counseling:Does not need ASA (and currently is not on it)    Age-appropriate Screening Schedule:  Refer to the list below for future screening recommendations based on patient's age, sex and/or medical conditions. Orders for these recommended tests are listed in the plan section. The patient has been provided with a written plan.    Health Maintenance   Topic Date Due   • COLONOSCOPY  Never done   • ZOSTER VACCINE (1 of 2) Never done   • DIABETIC EYE EXAM  Never done   • LIPID PANEL  09/23/2020   • INFLUENZA VACCINE  08/01/2021   • HEMOGLOBIN A1C  10/01/2021   • DIABETIC FOOT EXAM  04/01/2022   • URINE MICROALBUMIN  04/01/2022   • TDAP/TD VACCINES (2 - Td) 05/25/2027          The following portions of the patient's history were reviewed and updated as appropriate: allergies, current medications, past family history, past medical history, past  social history, past surgical history and problem list.    Outpatient Medications Prior to Visit   Medication Sig Dispense Refill   • allopurinol (ZYLOPRIM) 100 MG tablet TAKE ONE TABLET BY MOUTH EVERY DAY 90 tablet 3   • amLODIPine (NORVASC) 5 MG tablet Take 1 tablet by mouth Daily. 30 tablet 3   • atorvastatin (LIPITOR) 20 MG tablet TAKE ONE TABLET BY MOUTH EVERY DAY 90 tablet 3   • doxazosin (CARDURA) 2 MG tablet TAKE ONE TABLET BY MOUTH EVERY NIGHT AT BEDTIME 90 tablet 2   • DULoxetine (CYMBALTA) 30 MG capsule TAKE ONE CAPSULE BY MOUTH EVERY DAY 90 capsule 2   • finasteride (PROSCAR) 5 MG tablet Take 1 tablet by mouth Daily. 90 tablet 2   • lisinopril-hydrochlorothiazide (PRINZIDE,ZESTORETIC) 10-12.5 MG per tablet Take 1 tablet by mouth Daily. Patient is due to be seen 90 tablet 0   • metoprolol succinate XL (TOPROL-XL) 100 MG 24 hr tablet TAKE ONE TABLET BY MOUTH EVERY DAY 90 tablet 2   • Multiple Vitamins-Minerals (MULTIVITAMIN ADULT PO) Take  by mouth Daily.       No facility-administered medications prior to visit.       Patient Active Problem List   Diagnosis   • Benign non-nodular prostatic hyperplasia   • Vitamin D deficiency   • Type 2 diabetes mellitus without complication (CMS/HCC)   • Primary gout   • Hypothyroidism   • Hyperlipidemia   • Essential hypertension   • Spinal stenosis of lumbar region with neurogenic claudication   • Primary osteoarthritis of right hip   • Arthralgia of right hip   • Ventral hernia without obstruction or gangrene       Advanced Care Planning:  ACP discussion was held with the patient during this visit. Patient has an advance directive (not in EMR), copy requested.    Review of Systems   Constitutional: Negative.    HENT: Negative.    Eyes: Negative.    Respiratory: Negative.    Cardiovascular: Negative.    Gastrointestinal: Negative.    Endocrine: Negative.    Genitourinary: Negative.    Musculoskeletal: Negative.    Skin: Negative.    Allergic/Immunologic: Negative.   "  Neurological: Negative.    Hematological: Negative.    Psychiatric/Behavioral: Negative.    All other systems reviewed and are negative.      Compared to one year ago, the patient feels his physical health is the same.  Compared to one year ago, the patient feels his mental health is the same.    Reviewed chart for potential of high risk medication in the elderly: yes  Reviewed chart for potential of harmful drug interactions in the elderly:no    Objective         Vitals:    04/01/21 1510   BP: 140/90   Pulse: 76   Temp: 97.7 °F (36.5 °C)   SpO2: 98%   Weight: 112 kg (247 lb)   Height: 172.7 cm (68\")       Body mass index is 37.56 kg/m².  Discussed the patient's BMI with him. The BMI is above average; BMI management plan is completed.    Physical Exam  Constitutional:       Appearance: Normal appearance. He is obese.   HENT:      Right Ear: Tympanic membrane, ear canal and external ear normal.      Left Ear: Tympanic membrane, ear canal and external ear normal.      Nose: Nose normal.      Mouth/Throat:      Mouth: Mucous membranes are moist.      Pharynx: Oropharynx is clear.   Eyes:      Extraocular Movements: Extraocular movements intact.      Conjunctiva/sclera: Conjunctivae normal.      Pupils: Pupils are equal, round, and reactive to light.   Cardiovascular:      Rate and Rhythm: Normal rate and regular rhythm.      Pulses: Normal pulses.      Heart sounds: Normal heart sounds.   Pulmonary:      Effort: Pulmonary effort is normal.      Breath sounds: Normal breath sounds.   Abdominal:      General: Abdomen is flat. Bowel sounds are normal.      Palpations: Abdomen is soft.   Musculoskeletal:         General: Normal range of motion.      Cervical back: Normal range of motion and neck supple.   Skin:     General: Skin is warm.   Neurological:      General: No focal deficit present.      Mental Status: He is alert and oriented to person, place, and time. Mental status is at baseline.   Psychiatric:         Mood " and Affect: Mood normal.         Behavior: Behavior normal.         Thought Content: Thought content normal.         Judgment: Judgment normal.         Lab Results   Component Value Date    HGBA1C 6.6 04/01/2021        Assessment/Plan   Medicare Risks and Personalized Health Plan  CMS Preventative Services Quick Reference  Advance Directive Discussion    The above risks/problems have been discussed with the patient.  Pertinent information has been shared with the patient in the After Visit Summary.  Follow up plans and orders are seen below in the Assessment/Plan Section.    Diagnoses and all orders for this visit:    1. Type 2 diabetes mellitus without complication, without long-term current use of insulin (CMS/[facility]) (Primary) continue good diet check lab  -     POC Glycosylated Hemoglobin (Hb A1C)  -     POCT microalbumin    2. Hyperlipidemia, unspecified hyperlipidemia type continue medication    3. Essential hypertension high today continue to watch lower salt continue medications    4. Vitamin D deficiency continue supplement    5. Hypothyroidism, check lab    6. Benign non-nodular prostatic hyperplasia seen by urologist with PSA normal    7. Primary osteoarthritis of right hip pain knee surgery    8. Idiopathic gout, unspecified chronicity, unspecified site  -     Uric Acid    10. Spinal stenosis of lumbar region with neurogenic claudication    11. Ventral hernia without obstruction or gangrene    12. Wellness examination  -     CBC & Differential  -     Comprehensive Metabolic Panel  -     Lipid Panel  -     TSH  -     Hemoglobin A1c  -     CK    13. Facial skin lesion  -     Ambulatory Referral to Dermatology    colon done 3-4 years ago patient to get report   Follow Up:  Return in about 6 months (around 10/1/2021) for Recheck.     An After Visit Summary and PPPS were given to the patient.     1 mo after blood tests

## 2021-04-09 ENCOUNTER — PRE-ADMISSION TESTING (OUTPATIENT)
Dept: PREADMISSION TESTING | Facility: HOSPITAL | Age: 76
End: 2021-04-09

## 2021-04-09 ENCOUNTER — HOSPITAL ENCOUNTER (OUTPATIENT)
Dept: GENERAL RADIOLOGY | Facility: HOSPITAL | Age: 76
Discharge: HOME | End: 2021-04-09

## 2021-04-09 VITALS — HEIGHT: 68 IN | WEIGHT: 247.8 LBS | BODY MASS INDEX: 37.56 KG/M2

## 2021-04-09 DIAGNOSIS — Z01.818 PREOP EXAMINATION: Primary | ICD-10-CM

## 2021-04-09 DIAGNOSIS — M16.11 PRIMARY OSTEOARTHRITIS OF RIGHT HIP: ICD-10-CM

## 2021-04-09 DIAGNOSIS — M25.551 ARTHRALGIA OF RIGHT HIP: ICD-10-CM

## 2021-04-09 LAB
ABO GROUP BLD: NORMAL
ALBUMIN SERPL-MCNC: 4.3 G/DL (ref 3.5–5.2)
ALBUMIN/GLOB SERPL: 1.2 G/DL
ALP SERPL-CCNC: 126 U/L (ref 39–117)
ALT SERPL W P-5'-P-CCNC: 20 U/L (ref 1–41)
ANION GAP SERPL CALCULATED.3IONS-SCNC: 10.3 MMOL/L (ref 5–15)
APTT PPP: 36.9 SECONDS (ref 24.5–37.2)
AST SERPL-CCNC: 18 U/L (ref 1–40)
BASOPHILS # BLD AUTO: 0.04 10*3/MM3 (ref 0–0.2)
BASOPHILS NFR BLD AUTO: 0.4 % (ref 0–1.5)
BILIRUB SERPL-MCNC: 0.9 MG/DL (ref 0–1.2)
BILIRUB UR QL STRIP: NEGATIVE
BUN SERPL-MCNC: 11 MG/DL (ref 8–23)
BUN/CREAT SERPL: 10.7 (ref 7–25)
CALCIUM SPEC-SCNC: 9.7 MG/DL (ref 8.6–10.5)
CHLORIDE SERPL-SCNC: 99 MMOL/L (ref 98–107)
CHOLEST SERPL-MCNC: 135 MG/DL (ref 0–200)
CK SERPL-CCNC: 177 U/L (ref 20–200)
CLARITY UR: CLEAR
CO2 SERPL-SCNC: 24.7 MMOL/L (ref 22–29)
COLOR UR: YELLOW
CREAT SERPL-MCNC: 1.03 MG/DL (ref 0.76–1.27)
DEPRECATED RDW RBC AUTO: 43.9 FL (ref 37–54)
EOSINOPHIL # BLD AUTO: 0.13 10*3/MM3 (ref 0–0.4)
EOSINOPHIL NFR BLD AUTO: 1.2 % (ref 0.3–6.2)
ERYTHROCYTE [DISTWIDTH] IN BLOOD BY AUTOMATED COUNT: 13.4 % (ref 12.3–15.4)
GFR SERPL CREATININE-BSD FRML MDRD: 71 ML/MIN/1.73
GLOBULIN UR ELPH-MCNC: 3.6 GM/DL
GLUCOSE SERPL-MCNC: 115 MG/DL (ref 65–99)
GLUCOSE UR STRIP-MCNC: NEGATIVE MG/DL
HBA1C MFR BLD: 6.5 % (ref 4.8–5.6)
HCT VFR BLD AUTO: 48.2 % (ref 37.5–51)
HDLC SERPL-MCNC: 40 MG/DL (ref 40–60)
HGB BLD-MCNC: 16.3 G/DL (ref 13–17.7)
HGB UR QL STRIP.AUTO: NEGATIVE
IMM GRANULOCYTES # BLD AUTO: 0.06 10*3/MM3 (ref 0–0.05)
IMM GRANULOCYTES NFR BLD AUTO: 0.6 % (ref 0–0.5)
INR PPP: 0.91 (ref 0.9–1.1)
KETONES UR QL STRIP: NEGATIVE
LDLC SERPL CALC-MCNC: 62 MG/DL (ref 0–100)
LDLC/HDLC SERPL: 1.39 {RATIO}
LEUKOCYTE ESTERASE UR QL STRIP.AUTO: NEGATIVE
LYMPHOCYTES # BLD AUTO: 3.64 10*3/MM3 (ref 0.7–3.1)
LYMPHOCYTES NFR BLD AUTO: 34 % (ref 19.6–45.3)
MCH RBC QN AUTO: 30.4 PG (ref 26.6–33)
MCHC RBC AUTO-ENTMCNC: 33.8 G/DL (ref 31.5–35.7)
MCV RBC AUTO: 89.9 FL (ref 79–97)
MONOCYTES # BLD AUTO: 0.67 10*3/MM3 (ref 0.1–0.9)
MONOCYTES NFR BLD AUTO: 6.3 % (ref 5–12)
NEUTROPHILS NFR BLD AUTO: 57.5 % (ref 42.7–76)
NEUTROPHILS NFR BLD AUTO: 6.17 10*3/MM3 (ref 1.7–7)
NITRITE UR QL STRIP: NEGATIVE
NRBC BLD AUTO-RTO: 0 /100 WBC (ref 0–0.2)
PH UR STRIP.AUTO: 7.5 [PH] (ref 5–8)
PLATELET # BLD AUTO: 260 10*3/MM3 (ref 140–450)
PMV BLD AUTO: 10 FL (ref 6–12)
POTASSIUM SERPL-SCNC: 4.6 MMOL/L (ref 3.5–5.2)
PROT SERPL-MCNC: 7.9 G/DL (ref 6–8.5)
PROT UR QL STRIP: NEGATIVE
PROTHROMBIN TIME: 12.7 SECONDS (ref 12–15.1)
RBC # BLD AUTO: 5.36 10*6/MM3 (ref 4.14–5.8)
RH BLD: NEGATIVE
SODIUM SERPL-SCNC: 134 MMOL/L (ref 136–145)
SP GR UR STRIP: 1.01 (ref 1–1.03)
TRIGL SERPL-MCNC: 198 MG/DL (ref 0–150)
TSH SERPL DL<=0.05 MIU/L-ACNC: 1.81 UIU/ML (ref 0.27–4.2)
URATE SERPL-MCNC: 6.5 MG/DL (ref 3.4–7)
UROBILINOGEN UR QL STRIP: NORMAL
VLDLC SERPL-MCNC: 33 MG/DL (ref 5–40)
WBC # BLD AUTO: 10.71 10*3/MM3 (ref 3.4–10.8)

## 2021-04-09 PROCEDURE — 82550 ASSAY OF CK (CPK): CPT | Performed by: INTERNAL MEDICINE

## 2021-04-09 PROCEDURE — 93010 ELECTROCARDIOGRAM REPORT: CPT | Performed by: INTERNAL MEDICINE

## 2021-04-09 PROCEDURE — 85025 COMPLETE CBC W/AUTO DIFF WBC: CPT | Performed by: INTERNAL MEDICINE

## 2021-04-09 PROCEDURE — 85730 THROMBOPLASTIN TIME PARTIAL: CPT

## 2021-04-09 PROCEDURE — C9803 HOPD COVID-19 SPEC COLLECT: HCPCS | Performed by: INTERNAL MEDICINE

## 2021-04-09 PROCEDURE — 84443 ASSAY THYROID STIM HORMONE: CPT | Performed by: INTERNAL MEDICINE

## 2021-04-09 PROCEDURE — 86901 BLOOD TYPING SEROLOGIC RH(D): CPT

## 2021-04-09 PROCEDURE — 71046 X-RAY EXAM CHEST 2 VIEWS: CPT

## 2021-04-09 PROCEDURE — 85610 PROTHROMBIN TIME: CPT

## 2021-04-09 PROCEDURE — 84550 ASSAY OF BLOOD/URIC ACID: CPT | Performed by: INTERNAL MEDICINE

## 2021-04-09 PROCEDURE — 80053 COMPREHEN METABOLIC PANEL: CPT | Performed by: INTERNAL MEDICINE

## 2021-04-09 PROCEDURE — 83036 HEMOGLOBIN GLYCOSYLATED A1C: CPT | Performed by: INTERNAL MEDICINE

## 2021-04-09 PROCEDURE — 86900 BLOOD TYPING SEROLOGIC ABO: CPT

## 2021-04-09 PROCEDURE — 93005 ELECTROCARDIOGRAM TRACING: CPT

## 2021-04-09 PROCEDURE — 87081 CULTURE SCREEN ONLY: CPT

## 2021-04-09 PROCEDURE — 36415 COLL VENOUS BLD VENIPUNCTURE: CPT | Performed by: INTERNAL MEDICINE

## 2021-04-09 PROCEDURE — 80061 LIPID PANEL: CPT | Performed by: INTERNAL MEDICINE

## 2021-04-09 PROCEDURE — 81003 URINALYSIS AUTO W/O SCOPE: CPT

## 2021-04-09 PROCEDURE — U0004 COV-19 TEST NON-CDC HGH THRU: HCPCS

## 2021-04-09 RX ORDER — MULTIVIT-MIN/IRON/FOLIC ACID/K 18-600-40
2000 CAPSULE ORAL DAILY
COMMUNITY
End: 2021-10-04

## 2021-04-09 NOTE — DISCHARGE INSTRUCTIONS
PAT PASS GIVEN/REVIEWED WITH PT.  VERBALIZED UNDERSTANDING OF THE FOLLOWING:  DO NOT EAT, DRINK, SMOKE, USE SMOKELESS TOBACCO OR CHEW GUM AFTER MIDNIGHT THE NIGHT BEFORE SURGERY.  THIS ALSO INCLUDES HARD CANDIES AND MINTS.    DO NOT SHAVE THE AREA TO BE OPERATED ON AT LEAST 48 HOURS PRIOR TO THE PROCEDURE.  DO NOT WEAR MAKE UP OR NAIL POLISH.  DO NOT LEAVE IN ANY PIERCING OR WEAR JEWELRY THE DAY OF SURGERY.      DO NOT USE ADHESIVES IF YOU WEAR DENTURES.    DO NOT WEAR EYE CONTACTS; BRING IN YOUR GLASSES.    ONLY TAKE MEDICATION THE MORNING OF YOUR PROCEDURE IF INSTRUCTED BY YOUR SURGEON WITH ENOUGH WATER TO SWALLOW THE MEDICATION.  IF YOUR SURGEON DID NOT SPECIFY WHICH MEDICATIONS TO TAKE, YOU WILL NEED TO CALL THEIR OFFICE FOR FURTHER INSTRUCTIONS AND DO AS THEY INSTRUCT.    LEAVE ANYTHING YOU CONSIDER VALUABLE AT HOME.    YOU WILL NEED TO ARRANGE FOR SOMEONE TO DRIVE YOU HOME AFTER SURGERY.  IT IS RECOMMENDED THAT YOU DO NOT DRIVE, WORK, DRINK ALCOHOL OR MAKE MAJOR DECISIONS FOR AT LEAST 24 HOURS AFTER YOUR PROCEDURE IS COMPLETE.      THE DAY OF YOUR PROCEDURE, BRING IN THE FOLLOWING IF APPLICABLE:   PICTURE ID AND INSURANCE/MEDICARE OR MEDICAID CARDS/ANY CO-PAY THAT MAY BE DUE   COPY OF ADVANCED DIRECTIVE/LIVING WILL/POWER OR    CPAP/BIPAP/INHALERS   SKIN PREP SHEET   YOUR PREADMISSION TESTING PASS (IF NOT A PHONE HISTORY)        Chlorhexidine wipes along with instruction/verification sheet given to pt.  Instructed pt to date, time, and initial the verification sheet once skin prep has been  completed, and to return [facility] the day of the procedure.  Pt. Verbalizes understanding.      COVID self-quarantine instructions reviewed with the pt.  Verbalized understanding.

## 2021-04-10 LAB
MRSA SPEC QL CULT: NORMAL
SARS-COV-2 RNA NOSE QL NAA+PROBE: NOT DETECTED

## 2021-04-12 VITALS
HEART RATE: 76 BPM | DIASTOLIC BLOOD PRESSURE: 90 MMHG | HEIGHT: 68 IN | TEMPERATURE: 97.7 F | SYSTOLIC BLOOD PRESSURE: 140 MMHG | BODY MASS INDEX: 37.44 KG/M2 | WEIGHT: 247 LBS | OXYGEN SATURATION: 98 %

## 2021-04-13 ENCOUNTER — APPOINTMENT (OUTPATIENT)
Dept: ULTRASOUND IMAGING | Facility: HOSPITAL | Age: 76
End: 2021-04-13

## 2021-04-13 ENCOUNTER — APPOINTMENT (OUTPATIENT)
Dept: GENERAL RADIOLOGY | Facility: HOSPITAL | Age: 76
End: 2021-04-13

## 2021-04-13 ENCOUNTER — ANESTHESIA (OUTPATIENT)
Dept: PERIOP | Facility: HOSPITAL | Age: 76
End: 2021-04-13

## 2021-04-13 ENCOUNTER — HOSPITAL ENCOUNTER (OUTPATIENT)
Facility: HOSPITAL | Age: 76
Discharge: HOME HEALTH | End: 2021-04-15
Attending: ORTHOPAEDIC SURGERY | Admitting: ORTHOPAEDIC SURGERY

## 2021-04-13 ENCOUNTER — ANESTHESIA EVENT (OUTPATIENT)
Dept: PERIOP | Facility: HOSPITAL | Age: 76
End: 2021-04-13

## 2021-04-13 DIAGNOSIS — Z96.641 STATUS POST TOTAL HIP REPLACEMENT, RIGHT: Primary | ICD-10-CM

## 2021-04-13 DIAGNOSIS — M25.551 ARTHRALGIA OF RIGHT HIP: ICD-10-CM

## 2021-04-13 DIAGNOSIS — M16.11 PRIMARY OSTEOARTHRITIS OF RIGHT HIP: ICD-10-CM

## 2021-04-13 LAB
ABO GROUP BLD: NORMAL
BLD GP AB SCN SERPL QL: NEGATIVE
GLUCOSE BLDC GLUCOMTR-MCNC: 135 MG/DL (ref 70–130)
QT INTERVAL: 388 MS
QTC INTERVAL: 393 MS
RH BLD: NEGATIVE
T&S EXPIRATION DATE: NORMAL

## 2021-04-13 PROCEDURE — 25010000003 CEFAZOLIN PER 500 MG: Performed by: ORTHOPAEDIC SURGERY

## 2021-04-13 PROCEDURE — 63710000001 DOCUSATE SODIUM 100 MG CAPSULE: Performed by: ORTHOPAEDIC SURGERY

## 2021-04-13 PROCEDURE — A9270 NON-COVERED ITEM OR SERVICE: HCPCS | Performed by: ORTHOPAEDIC SURGERY

## 2021-04-13 PROCEDURE — 25010000002 MIDAZOLAM PER 1MG: Performed by: NURSE ANESTHETIST, CERTIFIED REGISTERED

## 2021-04-13 PROCEDURE — 97165 OT EVAL LOW COMPLEX 30 MIN: CPT

## 2021-04-13 PROCEDURE — 63710000001 CHOLECALCIFEROL 25 MCG (1000 UT) TABLET: Performed by: ORTHOPAEDIC SURGERY

## 2021-04-13 PROCEDURE — 25010000002 PROPOFOL 1000 MG/100ML EMULSION: Performed by: NURSE ANESTHETIST, CERTIFIED REGISTERED

## 2021-04-13 PROCEDURE — 63710000001 ATORVASTATIN 20 MG TABLET: Performed by: ORTHOPAEDIC SURGERY

## 2021-04-13 PROCEDURE — 97162 PT EVAL MOD COMPLEX 30 MIN: CPT

## 2021-04-13 PROCEDURE — 82962 GLUCOSE BLOOD TEST: CPT

## 2021-04-13 PROCEDURE — C1776 JOINT DEVICE (IMPLANTABLE): HCPCS | Performed by: ORTHOPAEDIC SURGERY

## 2021-04-13 PROCEDURE — 88300 SURGICAL PATH GROSS: CPT | Performed by: ORTHOPAEDIC SURGERY

## 2021-04-13 PROCEDURE — 63710000001 HYDROCODONE-ACETAMINOPHEN 10-325 MG TABLET: Performed by: ORTHOPAEDIC SURGERY

## 2021-04-13 PROCEDURE — 63710000001 PRO-STAT LIQUID: Performed by: ORTHOPAEDIC SURGERY

## 2021-04-13 PROCEDURE — 63710000001 AMLODIPINE 5 MG TABLET: Performed by: ORTHOPAEDIC SURGERY

## 2021-04-13 PROCEDURE — 63710000001 FINASTERIDE 5 MG TABLET: Performed by: ORTHOPAEDIC SURGERY

## 2021-04-13 PROCEDURE — 63710000001 MULTIVITAMIN WITH MINERALS TABLET: Performed by: ORTHOPAEDIC SURGERY

## 2021-04-13 PROCEDURE — 99213 OFFICE O/P EST LOW 20 MIN: CPT | Performed by: HOSPITALIST

## 2021-04-13 PROCEDURE — 63710000001 ALLOPURINOL 100 MG TABLET: Performed by: ORTHOPAEDIC SURGERY

## 2021-04-13 PROCEDURE — 27130 TOTAL HIP ARTHROPLASTY: CPT | Performed by: ORTHOPAEDIC SURGERY

## 2021-04-13 PROCEDURE — 86901 BLOOD TYPING SEROLOGIC RH(D): CPT | Performed by: ORTHOPAEDIC SURGERY

## 2021-04-13 PROCEDURE — 86850 RBC ANTIBODY SCREEN: CPT | Performed by: ORTHOPAEDIC SURGERY

## 2021-04-13 PROCEDURE — 63710000001 TERAZOSIN 1 MG CAPSULE: Performed by: ORTHOPAEDIC SURGERY

## 2021-04-13 PROCEDURE — 63710000001 HYDROCHLOROTHIAZIDE 12.5 MG CAPSULE: Performed by: ORTHOPAEDIC SURGERY

## 2021-04-13 PROCEDURE — 63710000001 LISINOPRIL 10 MG TABLET: Performed by: ORTHOPAEDIC SURGERY

## 2021-04-13 PROCEDURE — 25010000002 DEXAMETHASONE SODIUM PHOSPHATE 10 MG/ML SOLUTION: Performed by: NURSE ANESTHETIST, CERTIFIED REGISTERED

## 2021-04-13 PROCEDURE — 63710000001 DULOXETINE 30 MG CAPSULE DELAYED-RELEASE PARTICLES: Performed by: ORTHOPAEDIC SURGERY

## 2021-04-13 PROCEDURE — 86900 BLOOD TYPING SEROLOGIC ABO: CPT | Performed by: ORTHOPAEDIC SURGERY

## 2021-04-13 PROCEDURE — 25010000003 CEFAZOLIN SODIUM-DEXTROSE 1-4 GM-%(50ML) RECONSTITUTED SOLUTION: Performed by: ORTHOPAEDIC SURGERY

## 2021-04-13 PROCEDURE — 72170 X-RAY EXAM OF PELVIS: CPT

## 2021-04-13 PROCEDURE — G0378 HOSPITAL OBSERVATION PER HR: HCPCS

## 2021-04-13 PROCEDURE — 25010000003 CEFAZOLIN SODIUM-DEXTROSE 2-3 GM-%(50ML) RECONSTITUTED SOLUTION: Performed by: ORTHOPAEDIC SURGERY

## 2021-04-13 DEVICE — SHLL ACET OSSEOTI G7 3H SZE 52MM: Type: IMPLANTABLE DEVICE | Site: ACETABULUM | Status: FUNCTIONAL

## 2021-04-13 DEVICE — SCRW ACET CORT TRILOGY S/TAP 6.5X25: Type: IMPLANTABLE DEVICE | Site: HIP | Status: FUNCTIONAL

## 2021-04-13 DEVICE — LINER ACET G7/LONGEVITY HI/WL HXPE SZE 36MM: Type: IMPLANTABLE DEVICE | Site: ACETABULUM | Status: FUNCTIONAL

## 2021-04-13 DEVICE — ADAPT HIP BIOLOX OPTN TYPE1 TPR MIN 3: Type: IMPLANTABLE DEVICE | Site: HIP | Status: FUNCTIONAL

## 2021-04-13 DEVICE — HD FEM/HIP G7 BIOLOX/DELTA OPTN 36MM: Type: IMPLANTABLE DEVICE | Site: HIP | Status: FUNCTIONAL

## 2021-04-13 DEVICE — STEM FEM/HIP TAPERLOC COMPL DIST/REDUC PPS OFFST/STD SZ14: Type: IMPLANTABLE DEVICE | Site: FEMUR | Status: FUNCTIONAL

## 2021-04-13 DEVICE — SCRW ACET CORT TRILOGY S/TAP 6.5X30: Type: IMPLANTABLE DEVICE | Site: HIP | Status: FUNCTIONAL

## 2021-04-13 DEVICE — CP HIP UPCHRG OSSEOTI LTD HL CUPS: Type: IMPLANTABLE DEVICE | Site: ACETABULUM | Status: FUNCTIONAL

## 2021-04-13 DEVICE — TOTAL HIP PRIMARY: Type: IMPLANTABLE DEVICE | Site: ACETABULUM | Status: FUNCTIONAL

## 2021-04-13 RX ORDER — SODIUM CHLORIDE 0.9 % (FLUSH) 0.9 %
3 SYRINGE (ML) INJECTION EVERY 12 HOURS SCHEDULED
Status: DISCONTINUED | OUTPATIENT
Start: 2021-04-13 | End: 2021-04-15 | Stop reason: HOSPADM

## 2021-04-13 RX ORDER — TERAZOSIN 1 MG/1
2 CAPSULE ORAL NIGHTLY
Status: DISCONTINUED | OUTPATIENT
Start: 2021-04-13 | End: 2021-04-15 | Stop reason: HOSPADM

## 2021-04-13 RX ORDER — MULTIPLE VITAMINS W/ MINERALS TAB 9MG-400MCG
1 TAB ORAL DAILY
Status: DISCONTINUED | OUTPATIENT
Start: 2021-04-13 | End: 2021-04-15 | Stop reason: HOSPADM

## 2021-04-13 RX ORDER — FINASTERIDE 5 MG/1
5 TABLET, FILM COATED ORAL DAILY
Status: DISCONTINUED | OUTPATIENT
Start: 2021-04-13 | End: 2021-04-15 | Stop reason: HOSPADM

## 2021-04-13 RX ORDER — SODIUM CHLORIDE 0.9 % (FLUSH) 0.9 %
10 SYRINGE (ML) INJECTION AS NEEDED
Status: DISCONTINUED | OUTPATIENT
Start: 2021-04-13 | End: 2021-04-13 | Stop reason: HOSPADM

## 2021-04-13 RX ORDER — BUPIVACAINE HYDROCHLORIDE 5 MG/ML
INJECTION, SOLUTION EPIDURAL; INTRACAUDAL AS NEEDED
Status: DISCONTINUED | OUTPATIENT
Start: 2021-04-13 | End: 2021-04-13 | Stop reason: SURG

## 2021-04-13 RX ORDER — CEFAZOLIN SODIUM 1 G/50ML
1 SOLUTION INTRAVENOUS EVERY 8 HOURS
Status: COMPLETED | OUTPATIENT
Start: 2021-04-13 | End: 2021-04-14

## 2021-04-13 RX ORDER — MORPHINE SULFATE 2 MG/ML
2 INJECTION, SOLUTION INTRAMUSCULAR; INTRAVENOUS
Status: DISCONTINUED | OUTPATIENT
Start: 2021-04-13 | End: 2021-04-13 | Stop reason: HOSPADM

## 2021-04-13 RX ORDER — LISINOPRIL 10 MG/1
10 TABLET ORAL DAILY
Status: DISCONTINUED | OUTPATIENT
Start: 2021-04-13 | End: 2021-04-15 | Stop reason: HOSPADM

## 2021-04-13 RX ORDER — HYDROCODONE BITARTRATE AND ACETAMINOPHEN 10; 325 MG/1; MG/1
1 TABLET ORAL EVERY 6 HOURS PRN
Status: DISCONTINUED | OUTPATIENT
Start: 2021-04-13 | End: 2021-04-15 | Stop reason: HOSPADM

## 2021-04-13 RX ORDER — ALLOPURINOL 100 MG/1
100 TABLET ORAL DAILY
Status: DISCONTINUED | OUTPATIENT
Start: 2021-04-13 | End: 2021-04-15 | Stop reason: HOSPADM

## 2021-04-13 RX ORDER — DOCUSATE SODIUM 100 MG/1
100 CAPSULE, LIQUID FILLED ORAL 2 TIMES DAILY
Status: DISCONTINUED | OUTPATIENT
Start: 2021-04-13 | End: 2021-04-15 | Stop reason: HOSPADM

## 2021-04-13 RX ORDER — ONDANSETRON 2 MG/ML
4 INJECTION INTRAMUSCULAR; INTRAVENOUS EVERY 6 HOURS PRN
Status: DISCONTINUED | OUTPATIENT
Start: 2021-04-13 | End: 2021-04-15 | Stop reason: HOSPADM

## 2021-04-13 RX ORDER — BUPIVACAINE HYDROCHLORIDE 7.5 MG/ML
INJECTION, SOLUTION EPIDURAL; RETROBULBAR
Status: COMPLETED | OUTPATIENT
Start: 2021-04-13 | End: 2021-04-13

## 2021-04-13 RX ORDER — AMLODIPINE BESYLATE 5 MG/1
5 TABLET ORAL DAILY
Status: DISCONTINUED | OUTPATIENT
Start: 2021-04-13 | End: 2021-04-15 | Stop reason: HOSPADM

## 2021-04-13 RX ORDER — NALOXONE HCL 0.4 MG/ML
0.1 VIAL (ML) INJECTION
Status: DISCONTINUED | OUTPATIENT
Start: 2021-04-13 | End: 2021-04-15 | Stop reason: HOSPADM

## 2021-04-13 RX ORDER — ONDANSETRON 2 MG/ML
4 INJECTION INTRAMUSCULAR; INTRAVENOUS ONCE AS NEEDED
Status: DISCONTINUED | OUTPATIENT
Start: 2021-04-13 | End: 2021-04-13 | Stop reason: HOSPADM

## 2021-04-13 RX ORDER — PROPOFOL 10 MG/ML
INJECTION, EMULSION INTRAVENOUS CONTINUOUS PRN
Status: DISCONTINUED | OUTPATIENT
Start: 2021-04-13 | End: 2021-04-13 | Stop reason: SURG

## 2021-04-13 RX ORDER — HYDROCHLOROTHIAZIDE 12.5 MG/1
12.5 CAPSULE, GELATIN COATED ORAL DAILY
Status: DISCONTINUED | OUTPATIENT
Start: 2021-04-13 | End: 2021-04-15 | Stop reason: HOSPADM

## 2021-04-13 RX ORDER — AMINO ACIDS/PROTEIN HYDROLYS 15G-100/30
30 LIQUID (ML) ORAL 2 TIMES DAILY
Status: DISCONTINUED | OUTPATIENT
Start: 2021-04-13 | End: 2021-04-15 | Stop reason: HOSPADM

## 2021-04-13 RX ORDER — DULOXETIN HYDROCHLORIDE 30 MG/1
30 CAPSULE, DELAYED RELEASE ORAL DAILY
Status: DISCONTINUED | OUTPATIENT
Start: 2021-04-13 | End: 2021-04-15 | Stop reason: HOSPADM

## 2021-04-13 RX ORDER — DEXAMETHASONE SODIUM PHOSPHATE 10 MG/ML
INJECTION, SOLUTION INTRAMUSCULAR; INTRAVENOUS AS NEEDED
Status: DISCONTINUED | OUTPATIENT
Start: 2021-04-13 | End: 2021-04-13 | Stop reason: SURG

## 2021-04-13 RX ORDER — MELATONIN
500 DAILY
Status: DISCONTINUED | OUTPATIENT
Start: 2021-04-13 | End: 2021-04-15 | Stop reason: HOSPADM

## 2021-04-13 RX ORDER — MIDAZOLAM HYDROCHLORIDE 2 MG/2ML
INJECTION, SOLUTION INTRAMUSCULAR; INTRAVENOUS AS NEEDED
Status: DISCONTINUED | OUTPATIENT
Start: 2021-04-13 | End: 2021-04-13 | Stop reason: SURG

## 2021-04-13 RX ORDER — ONDANSETRON 4 MG/1
4 TABLET, FILM COATED ORAL EVERY 6 HOURS PRN
Status: DISCONTINUED | OUTPATIENT
Start: 2021-04-13 | End: 2021-04-15 | Stop reason: HOSPADM

## 2021-04-13 RX ORDER — MEPERIDINE HYDROCHLORIDE 25 MG/ML
25 INJECTION INTRAMUSCULAR; INTRAVENOUS; SUBCUTANEOUS ONCE
Status: DISCONTINUED | OUTPATIENT
Start: 2021-04-13 | End: 2021-04-13 | Stop reason: HOSPADM

## 2021-04-13 RX ORDER — METOPROLOL SUCCINATE 100 MG/1
100 TABLET, EXTENDED RELEASE ORAL DAILY
Status: DISCONTINUED | OUTPATIENT
Start: 2021-04-13 | End: 2021-04-15 | Stop reason: HOSPADM

## 2021-04-13 RX ORDER — ATORVASTATIN CALCIUM 20 MG/1
20 TABLET, FILM COATED ORAL NIGHTLY
Status: DISCONTINUED | OUTPATIENT
Start: 2021-04-13 | End: 2021-04-15 | Stop reason: HOSPADM

## 2021-04-13 RX ORDER — CEFAZOLIN SODIUM 2 G/50ML
2 SOLUTION INTRAVENOUS
Status: COMPLETED | OUTPATIENT
Start: 2021-04-13 | End: 2021-04-13

## 2021-04-13 RX ORDER — SODIUM CHLORIDE, SODIUM LACTATE, POTASSIUM CHLORIDE, CALCIUM CHLORIDE 600; 310; 30; 20 MG/100ML; MG/100ML; MG/100ML; MG/100ML
1000 INJECTION, SOLUTION INTRAVENOUS CONTINUOUS
Status: DISCONTINUED | OUTPATIENT
Start: 2021-04-13 | End: 2021-04-13

## 2021-04-13 RX ORDER — SODIUM CHLORIDE, SODIUM LACTATE, POTASSIUM CHLORIDE, CALCIUM CHLORIDE 600; 310; 30; 20 MG/100ML; MG/100ML; MG/100ML; MG/100ML
100 INJECTION, SOLUTION INTRAVENOUS CONTINUOUS
Status: DISCONTINUED | OUTPATIENT
Start: 2021-04-13 | End: 2021-04-15 | Stop reason: HOSPADM

## 2021-04-13 RX ORDER — MAGNESIUM HYDROXIDE 1200 MG/15ML
LIQUID ORAL AS NEEDED
Status: DISCONTINUED | OUTPATIENT
Start: 2021-04-13 | End: 2021-04-13 | Stop reason: HOSPADM

## 2021-04-13 RX ADMIN — AMLODIPINE BESYLATE 5 MG: 5 TABLET ORAL at 14:00

## 2021-04-13 RX ADMIN — SODIUM CHLORIDE, POTASSIUM CHLORIDE, SODIUM LACTATE AND CALCIUM CHLORIDE: 600; 310; 30; 20 INJECTION, SOLUTION INTRAVENOUS at 08:12

## 2021-04-13 RX ADMIN — CEFAZOLIN SODIUM 2 G: 2 SOLUTION INTRAVENOUS at 07:52

## 2021-04-13 RX ADMIN — SODIUM CHLORIDE, POTASSIUM CHLORIDE, SODIUM LACTATE AND CALCIUM CHLORIDE: 600; 310; 30; 20 INJECTION, SOLUTION INTRAVENOUS at 09:14

## 2021-04-13 RX ADMIN — DULOXETINE HYDROCHLORIDE 30 MG: 30 CAPSULE, DELAYED RELEASE ORAL at 14:00

## 2021-04-13 RX ADMIN — SODIUM CHLORIDE, PRESERVATIVE FREE 3 ML: 5 INJECTION INTRAVENOUS at 21:01

## 2021-04-13 RX ADMIN — Medication 30 ML: at 20:58

## 2021-04-13 RX ADMIN — BUPIVACAINE HYDROCHLORIDE 2 ML: 7.5 INJECTION, SOLUTION EPIDURAL; RETROBULBAR at 09:08

## 2021-04-13 RX ADMIN — SODIUM CHLORIDE, PRESERVATIVE FREE 3 ML: 5 INJECTION INTRAVENOUS at 12:45

## 2021-04-13 RX ADMIN — DOCUSATE SODIUM 100 MG: 100 CAPSULE, LIQUID FILLED ORAL at 20:58

## 2021-04-13 RX ADMIN — SODIUM CHLORIDE 1000 MG: 9 INJECTION, SOLUTION INTRAVENOUS at 08:07

## 2021-04-13 RX ADMIN — SODIUM CHLORIDE, POTASSIUM CHLORIDE, SODIUM LACTATE AND CALCIUM CHLORIDE 1000 ML: 600; 310; 30; 20 INJECTION, SOLUTION INTRAVENOUS at 07:07

## 2021-04-13 RX ADMIN — CEFAZOLIN SODIUM 1 G: 1 SOLUTION INTRAVENOUS at 17:00

## 2021-04-13 RX ADMIN — LISINOPRIL 10 MG: 10 TABLET ORAL at 14:00

## 2021-04-13 RX ADMIN — Medication 1 TABLET: at 14:00

## 2021-04-13 RX ADMIN — ALLOPURINOL 100 MG: 100 TABLET ORAL at 14:00

## 2021-04-13 RX ADMIN — HYDROCODONE BITARTRATE AND ACETAMINOPHEN 1 TABLET: 10; 325 TABLET ORAL at 14:15

## 2021-04-13 RX ADMIN — Medication 500 UNITS: at 14:00

## 2021-04-13 RX ADMIN — PROPOFOL 75 MCG/KG/MIN: 10 INJECTION, EMULSION INTRAVENOUS at 08:04

## 2021-04-13 RX ADMIN — SODIUM CHLORIDE, POTASSIUM CHLORIDE, SODIUM LACTATE AND CALCIUM CHLORIDE 100 ML/HR: 600; 310; 30; 20 INJECTION, SOLUTION INTRAVENOUS at 11:55

## 2021-04-13 RX ADMIN — TERAZOSIN HYDROCHLORIDE 2 MG: 1 CAPSULE ORAL at 20:58

## 2021-04-13 RX ADMIN — ATORVASTATIN CALCIUM 20 MG: 20 TABLET, FILM COATED ORAL at 20:58

## 2021-04-13 RX ADMIN — MIDAZOLAM HYDROCHLORIDE 2 MG: 1 INJECTION, SOLUTION INTRAMUSCULAR; INTRAVENOUS at 07:52

## 2021-04-13 RX ADMIN — DEXAMETHASONE SODIUM PHOSPHATE 10 MG: 10 INJECTION, SOLUTION INTRAMUSCULAR; INTRAVENOUS at 11:02

## 2021-04-13 RX ADMIN — HYDROCHLOROTHIAZIDE 12.5 MG: 12.5 CAPSULE ORAL at 14:00

## 2021-04-13 RX ADMIN — DOCUSATE SODIUM 100 MG: 100 CAPSULE, LIQUID FILLED ORAL at 13:00

## 2021-04-13 RX ADMIN — SODIUM CHLORIDE, POTASSIUM CHLORIDE, SODIUM LACTATE AND CALCIUM CHLORIDE 100 ML/HR: 600; 310; 30; 20 INJECTION, SOLUTION INTRAVENOUS at 21:01

## 2021-04-13 RX ADMIN — FINASTERIDE 5 MG: 5 TABLET, FILM COATED ORAL at 14:00

## 2021-04-13 RX ADMIN — FAMOTIDINE 20 MG: 10 INJECTION INTRAVENOUS at 07:07

## 2021-04-13 RX ADMIN — BUPIVACAINE HYDROCHLORIDE 4010 MG: 5 INJECTION, SOLUTION EPIDURAL; INTRACAUDAL at 11:02

## 2021-04-13 NOTE — THERAPY EVALUATION
Patient Name: John Leyva  : 1945    MRN: [id number]                              Today's Date: 2021       Admit Date: 2021    Visit Dx:     ICD-10-CM ICD-9-CM   1. Primary osteoarthritis of right hip  M16.11 715.15   2. Arthralgia of right hip  M25.551 719.45     Patient Active Problem List   Diagnosis   • Benign non-nodular prostatic hyperplasia   • Vitamin D deficiency   • Type 2 diabetes mellitus without complication (CMS/HCC)   • Primary gout   • Hypothyroidism   • Hyperlipidemia   • Essential hypertension   • Spinal stenosis of lumbar region with neurogenic claudication   • Primary osteoarthritis of right hip   • Arthralgia of right hip   • Ventral hernia without obstruction or gangrene   • Status post total hip replacement, right     Past Medical History:   Diagnosis Date   • Arthritis    • BPH (benign prostatic hyperplasia)    • Cataract, bilateral     s/p surgery   • Diabetes mellitus (CMS/HCC)     no medications    • ED (erectile dysfunction)    • Elevated cholesterol    • Elevated PSA    • Gout    • Hypertension    • Hypogonadism in male    • Hypothyroidism     no medications   • Impaired functional mobility, balance, gait, and endurance    • Low kidney function    • Wears glasses      Past Surgical History:   Procedure Laterality Date   • APPENDECTOMY     • COLONOSCOPY     • ELBOW OLECRANNON BURSA EXCISION Right    • EYE SURGERY Bilateral     cataracts removed   • LUMBAR LAMINECTOMY DISCECTOMY DECOMPRESSION Right 2020    Procedure: LUMBAR LAMINECTOMY L4-5, HEMILAMINECTOMY L3-4 RIGHT;  Surgeon: Paulie Daniels MD;  Location: [facility];  Service: Neurosurgery;  Laterality: Right;   • RETINAL DETACHMENT SURGERY Right     x3 surgeries   • SKIN BIOPSY      benign     General Information     Row Name 21 1528          OT Time and Intention    Document Type  evaluation  -SD     Mode of Treatment  occupational therapy  -SD     Row Name 21 1528          General Information     Patient Profile Reviewed  yes  -SD     Prior Level of Function  independent:;community mobility  -SD     Existing Precautions/Restrictions  fall;hip, posterior;right  -SD     Barriers to Rehab  none identified  -[facility] Name 04/13/21 1528          Living Environment    Lives With  spouse  -[facility] Name 04/13/21 1528          Home Main Entrance    Number of Stairs, Main Entrance  none  -[facility] Name 04/13/21 1528          Stairs Within Home, Primary    Number of Stairs, Within Home, Primary  other (see comments) 13 to upper level, can stay on 1st level, but would have to sleep in recliner  -SD     [facility] Name 04/13/21 1528          Cognition    Orientation Status (Cognition)  oriented x 4  -SD     [facility] Name 04/13/21 1528          Safety Issues, Functional Mobility    Safety Issues Affecting Function (Mobility)  safety precautions follow-through/compliance;safety precaution awareness;awareness of need for assistance  -SD     Impairments Affecting Function (Mobility)  balance;endurance/activity tolerance;pain;strength  -SD       User Key  (r) = Recorded By, (t) = Taken By, (c) = Cosigned By    Initials Name Provider Type    SD Lucrecia Muro, OT Occupational Therapist          Mobility/ADL's     [facility] Name 04/13/21 1529          Bed Mobility    Bed Mobility  supine-sit  -SD     Supine-Sit [place] (Bed Mobility)  minimum assist (75% patient effort)  -SD     Bed Mobility, Safety Issues  decreased use of legs for bridging/pushing  -SD     Assistive Device (Bed Mobility)  bed rails;head of bed elevated  -[facility] Name 04/13/21 1529          Transfers    Transfers  sit-stand transfer  -SD     Sit-Stand [place] (Transfers)  minimum assist (75% patient effort)  -[facility] Name 04/13/21 1529          Sit-Stand Transfer    Assistive Device (Sit-Stand Transfers)  walker, front-wheeled  -[facility] Name 04/13/21 1529          Functional Mobility    Functional Mobility- Ind. Level  minimum assist (75% patient  effort)  -SD     Functional Mobility- Device  rolling walker  -SD     Functional Mobility-Distance (Feet)  46  -SD     Functional Mobility- Safety Issues  balance decreased during turns;sequencing ability decreased;step length decreased;weight-shifting ability decreased  -SD     Row Name 04/13/21 1529          Activities of Daily Living    BADL Assessment/Intervention  bathing;upper body dressing;lower body dressing;grooming;feeding;toileting  -SD     Row Name 04/13/21 1529          Mobility    Extremity Weight-bearing Status  right lower extremity  -SD     Right Lower Extremity (Weight-bearing Status)  weight-bearing as tolerated (WBAT)  -SD     Row Name 04/13/21 1529          Bathing Assessment/Intervention    [place] Level (Bathing)  lower body;maximum assist (25% patient effort)  -SD     Row Name 04/13/21 1529          Upper Body Dressing Assessment/Training    [place] Level (Upper Body Dressing)  supervision  -SD     Row Name 04/13/21 1529          Lower Body Dressing Assessment/Training    [place] Level (Lower Body Dressing)  don;pants/bottoms;shoes/slippers;socks;maximum assist (25% patient effort)  -SD     Row Name 04/13/21 1529          Self-Feeding Assessment/Training    [place] Level (Feeding)  set up  -SD     Row Name 04/13/21 1529          Toileting Assessment/Training    [place] Level (Toileting)  moderate assist (50% patient effort)  -SD     Assistive Devices (Toileting)  commode, bedside without drop arms  -SD     Comment (Toileting)  urinary catheter  -SD       User Key  (r) = Recorded By, (t) = Taken By, (c) = Cosigned By    Initials Name Provider Type    SD Lucrecia Muro OT Occupational Therapist        Obj/Interventions     Row Name 04/13/21 1530          Range of Motion Comprehensive    General Range of Motion  bilateral upper extremity ROM WFL  -SD     Row Name 04/13/21 1530          Strength Comprehensive (MMT)    Comment, General Manual Muscle Testing (MMT)  Assessment  UB 4/5  -SD       User Key  (r) = Recorded By, (t) = Taken By, (c) = Cosigned By    Initials Name Provider Type    Lucrecia Quinteros OT Occupational Therapist        Goals/Plan     Row Name 04/13/21 1533          Transfer Goal 1 (OT)    Activity/Assistive Device (Transfer Goal 1, OT)  sit-to-stand/stand-to-sit;walker, rolling  -SD     [place] Level/Cues Needed (Transfer Goal 1, OT)  standby assist  -SD     Time Frame (Transfer Goal 1, OT)  long term goal (LTG)  -SD     Progress/Outcome (Transfer Goal 1, OT)  goal ongoing  -SD     Row Name 04/13/21 1533          Dressing Goal 1 (OT)    Activity/Device (Dressing Goal 1, OT)  lower body dressing;reacher;sock-aid  -SD     [place]/Cues Needed (Dressing Goal 1, OT)  minimum assist (75% or more patient effort);contact guard assist  -SD     Time Frame (Dressing Goal 1, OT)  2 weeks  -SD     Progress/Outcome (Dressing Goal 1, OT)  goal ongoing  -SD     Row Name 04/13/21 1533          Toileting Goal 1 (OT)    Activity/Device (Toileting Goal 1, OT)  toileting skills, all;commode, bedside without drop arms;commode, 3-in-1  -SD     [place] Level/Cues Needed (Toileting Goal 1, OT)  minimum assist (75% or more patient effort);contact guard assist  -SD     Time Frame (Toileting Goal 1, OT)  2 weeks  -SD     Progress/Outcome (Toileting Goal 1, OT)  goal ongoing  -SD     Row Name 04/13/21 1533          Strength Goal 1 (OT)    Strength Goal 1 (OT)  Patient to be ind with UB HEP prior to DC.  -SD     Time Frame (Strength Goal 1, OT)  long term goal (LTG)  -SD     Progress/Outcome (Strength Goal 1, OT)  goal ongoing  -SD     Row Name 04/13/21 1533          Therapy Assessment/Plan (OT)    Planned Therapy Interventions (OT)  activity tolerance training;adaptive equipment training;BADL retraining;patient/caregiver education/training;ROM/therapeutic exercise;strengthening exercise;transfer/mobility retraining  -SD       User Key  (r) = Recorded By, (t) = Taken  By, (c) = Cosigned By    Initials Name Provider Type    Lucrecia Quinteros OT Occupational Therapist        Clinical Impression     Row Name 04/13/21 1531          Pain Scale: Numbers Pre/Post-Treatment    Pretreatment Pain Rating  4/10  -SD     Posttreatment Pain Rating  4/10  -SD     Pain Location - Side  Right  -SD     Pain Location - Orientation  lower  -SD     Pain Location  extremity  -SD     Pain Intervention(s)  Repositioned;Ambulation/increased activity  -SD     Row Name 04/13/21 1531          Plan of Care Review    Plan of Care Reviewed With  patient;spouse  -SD     Progress  no change  -SD     Outcome Summary  OT eval completed. Patient presents deficits in strength, endurance, balance, mobility and ADL performance following R LASHELL. Patient is expected to benefit from continued OT services prior to DC. Wants to DC home with HH and spouse assisting as needed. Will require a RW, BSC, SC and hip kit upon DC.  -SD     Row Name 04/13/21 1531          Therapy Assessment/Plan (OT)    Rehab Potential (OT)  good, to achieve stated therapy goals  -SD     Criteria for Skilled Therapeutic Interventions Met (OT)  skilled treatment is necessary  -SD     Therapy Frequency (OT)  daily Mon-Fri  -SD     Row Name 04/13/21 1531          Therapy Plan Review/Discharge Plan (OT)    Equipment Needs Upon Discharge (OT)  walker, rolling;commode chair;shower chair;reacher;dressing equipment  -SD     Anticipated Discharge Disposition (OT)  home with assist;home with home health  -SD     Row Name 04/13/21 1531          Positioning and Restraints    Pre-Treatment Position  in bed  -SD     Post Treatment Position  chair  -SD     In Chair  reclined;notified nsg;call light within reach;encouraged to call for assist;with family/caregiver;pillow between legs  -SD       User Key  (r) = Recorded By, (t) = Taken By, (c) = Cosigned By    Initials Name Provider Type    Lucrecia Quinteros OT Occupational Therapist        Outcome Measures      Row Name 04/13/21 1534          How much help from another is currently needed...    Putting on and taking off regular lower body clothing?  2  -SD     Bathing (including washing, rinsing, and drying)  2  -SD     Toileting (which includes using toilet bed pan or urinal)  2  -SD     Putting on and taking off regular upper body clothing  3  -SD     Taking care of personal grooming (such as brushing teeth)  3  -SD     Eating meals  4  -SD     AM-PAC 6 Clicks Score (OT)  16  -SD     Row Name 04/13/21 1534          Functional Assessment    Outcome Measure Options  AM-PAC 6 Clicks Daily Activity (OT)  -SD       User Key  (r) = Recorded By, (t) = Taken By, (c) = Cosigned By    Initials Name Provider Type    Lucrecia Quinteros OT Occupational Therapist        Occupational Therapy Education                 Title: PT OT SLP Therapies (In Progress)     Topic: Occupational Therapy (In Progress)     Point: ADL training (Done)     Description:   Instruct learner(s) on proper safety adaptation and remediation techniques during self care or transfers.   Instruct in proper use of assistive devices.              Learning Progress Summary           Patient Acceptance, E,TB, VU by SD at 4/13/2021 1535    Comment: Benefit of OT; OT POC   Significant Other Acceptance, E,TB, VU by SD at 4/13/2021 1535    Comment: Benefit of OT; OT POC                   Point: Home exercise program (Not Started)     Description:   Instruct learner(s) on appropriate technique for monitoring, assisting and/or progressing therapeutic exercises/activities.              Learner Progress:  Not documented in this visit.          Point: Precautions (Not Started)     Description:   Instruct learner(s) on prescribed precautions during self-care and functional transfers.              Learner Progress:  Not documented in this visit.          Point: Body mechanics (Not Started)     Description:   Instruct learner(s) on proper positioning and spine alignment during  self-care, functional mobility activities and/or exercises.              Learner Progress:  Not documented in this visit.                      User Key     Initials Effective Dates Name Provider Type Discipline    SD 03/07/18 -  Lucrecia Muro OT Occupational Therapist OT              OT Recommendation and Plan  Planned Therapy Interventions (OT): activity tolerance training, adaptive equipment training, BADL retraining, patient/caregiver education/training, ROM/therapeutic exercise, strengthening exercise, transfer/mobility retraining  Therapy Frequency (OT): daily (Mon-Fri)  Plan of Care Review  Plan of Care Reviewed With: patient, spouse  Progress: no change  Outcome Summary: OT eval completed. Patient presents deficits in strength, endurance, balance, mobility and ADL performance following R LASHELL. Patient is expected to benefit from continued OT services prior to DC. Wants to DC home with HH and spouse assisting as needed. Will require a RW, BSC, SC and hip kit upon DC.     Time Calculation:   Time Calculation- OT     Row Name 04/13/21 1535             Time Calculation- OT    OT Start Time  1403  -SD      OT Received On  04/13/21  -SD      OT Goal Re-Cert Due Date  04/23/21  -SD        User Key  (r) = Recorded By, (t) = Taken By, (c) = Cosigned By    Initials Name Provider Type    SD Lucrecia Muro OT Occupational Therapist        Therapy Charges for Today     Code Description Service Date Service Provider Modifiers Qty    [id number]  OT EVAL LOW COMPLEXITY 3 4/13/2021 Lucrecia Muro OT GO 1               Lucrecia Muro OT  4/13/2021

## 2021-04-13 NOTE — PROGRESS NOTES
"Adult Nutrition  Assessment/PES    Patient Name:  Jonh Leyva  YOB: 1945  MRN: [id number]  Admit Date:  4/13/2021    Assessment Date:  4/13/2021    Comments:    Recommend:  1. Consider adding cardiac and consistent carbohydrate modifications to current diet order as medically appropriate and tolerated.  2. Establish and encourage PO intake.  3. RD ordered Prostat BID.  4. Continue with a multivitamin with minerals daily.  5. Continue to monitor and replace electrolytes PRN.     RD to follow pt and available PRN.      Reason for Assessment     Row Name 04/13/21 1257          Reason for Assessment    Reason For Assessment  diagnosis/disease state;identified at risk by screening criteria     Diagnosis  diabetes diagnosis/complications;other (see comments) DM 2, Osteoarthritis, Hip replacement     Identified At Risk by Screening Criteria  BMI           Anthropometrics     Row Name 04/13/21 1259          Anthropometrics    Height  172.7 cm (68\")        Ideal Body Weight (IBW)    Ideal Body Weight (IBW) (kg)  70.89         Labs/Tests/Procedures/Meds     Row Name 04/13/21 1258          Labs/Procedures/Meds    Lab Results Reviewed  reviewed, pertinent     Lab Results Comments  Low: Na+ High: Gluc, HgbA1c, TG        Medications    Pertinent Medications Reviewed  reviewed, pertinent     Pertinent Medications Comments  Lipitor, Colace, Vitamin D3, Multivitamin with minerals         Physical Findings     Row Name 04/13/21 1259          Physical Findings    Overall Physical Appearance  obese     Skin  surgical incision R anterior hip         Estimated/Assessed Needs     Row Name 04/13/21 1259          Calculation Measurements    Weight Used For Calculations  112 kg (247 lb 12.8 oz) Actual BW     Height  172.7 cm (68\")        Estimated/Assessed Needs    Additional Documentation  KCAL/KG (Group);Calorie Requirements (Group);Fluid Requirements (Group);Protein Requirements (Group)        Calorie Requirements    " Estimated Calorie Requirement Comment  1575 - 1775        KCAL/KG    KCAL/KG  14 Kcal/Kg (kcal)     14 Kcal/Kg (kcal)  1573.614        Protein Requirements    Weight Used For Protein Calculations  69.9 kg (154 lb) IBW     Est Protein Requirement Amount (gms/kg)  2.0 gm protein ~140 gm     Estimated Protein Requirements (gms/day)  139.71        Fluid Requirements    Estimated Fluid Requirement Method  [place] Formula     [place] Method (over 20 kg)  3748.02         Nutrition Prescription Ordered     Row Name 04/13/21 1313          Nutrition Prescription PO    Current PO Diet  Regular         Evaluation of Received Nutrient/Fluid Intake     Row Name 04/13/21 1320          PO Evaluation    Number of Days PO Intake Evaluated  Insufficient Data               Problem/Interventions:  Problem 1     Row Name 04/13/21 1321          Nutrition Diagnoses Problem 1    Problem 1  Altered Nutrition Related to Labs     Etiology (related to)  Medical Diagnosis     Endocrine  DM2     Signs/Symptoms (evidenced by)  Biochemical     Specific Labs Noted  Glucose;HgbA1C         Problem 2     Row Name 04/13/21 1321          Nutrition Diagnoses Problem 2    Problem 2  Overweight/Obesity     Etiology (related to)  Factors Affecting Nutrition     Food Habit/Preferences  Large Meals     Signs/Symptoms (evidenced by)  BMI     BMI  35 - 39.9         Problem 3     Row Name 04/13/21 1321          Nutrition Diagnoses Problem 3    Problem 3  Increased Nutrient Needs     Macronutrient  Kcal;Fluid;Protein     Etiology (related to)  Medical Diagnosis     Musculoskeletal  Other (comment) Surgical hip replacement     Signs/Symptoms (evidenced by)  Other (comment) Surgery noted           Intervention Goal     Row Name 04/13/21 1322          Intervention Goal    General  Meet nutritional needs for age/condition;Improved nutrition related lab(s)     PO  Meet estimated needs;Establish PO;PO intake (%)     PO Intake %  50 %     Weight  No  significant weight loss         Nutrition Intervention     Row Name 04/13/21 1322          Nutrition Intervention    RD/Tech Action  Follow Tx progress;Encourage intake;Recommend/ordered     Recommended/Ordered  Diet;Supplement         Nutrition Prescription     Row Name 04/13/21 1322          Nutrition Prescription PO    PO Prescription  Begin/change supplement;Begin/change diet     Begin/Change Diet to  Regular     Supplement  PRO liquid     Supplement Frequency  2 times a day     Common Modifiers  Consistent Carbohydrate;Cardiac     New PO Prescription Ordered?  Yes Supplement ordered        Other Orders    Obtain Weight  Daily     Obtain Weight Ordered?  No, recommended     Supplement  Vitamin mineral supplement     Supplement Ordered?  No, recommended     Other  Continue to monitor and replace electrolytes PRN         Education/Evaluation     Row Name 04/13/21 1322          Education    Education  Will Instruct as appropriate        Monitor/Evaluation    Monitor  Per protocol;I&O;PO intake;Supplement intake;Pertinent labs;Weight;Skin status           Electronically signed by:  Haylee Art RD  04/13/21 13:23 EDT

## 2021-04-13 NOTE — PLAN OF CARE
Goal Outcome Evaluation:  Plan of Care Reviewed With: patient, spouse  Progress: no change  Outcome Summary: PT eval completed on a patient who is s/p R LASHELL and is WBAT. He presents with deficits in strength, ROM, balance, endurance and independence but is expected to benefit from continued skilled PT intervention to improve his mobility status prior to D/C.

## 2021-04-13 NOTE — ANESTHESIA POSTPROCEDURE EVALUATION
Patient: John Leyva    Procedure Summary     Date: 04/13/21 Room / Location: [facility] OR  / [facility] OR    Anesthesia Start: 0752 Anesthesia Stop: 1026    Procedure: Elective total hip arthroplasty (Right Hip) Diagnosis:       Primary osteoarthritis of right hip      Arthralgia of right hip      (Primary osteoarthritis of right hip [M16.11])      (Arthralgia of right hip [M25.551])    Surgeons: Jose Carlson MD Provider: Joe Martinez CRNA    Anesthesia Type: spinal, regional ASA Status: 3          Anesthesia Type: spinal, regional    Vitals  Vitals Value Taken Time   /65 04/13/21 1130   Temp 98 °F (36.7 °C) 04/13/21 1130   Pulse 58 04/13/21 1133   Resp 19 04/13/21 1130   SpO2 94 % 04/13/21 1134   Vitals shown include unvalidated device data.        Post Anesthesia Care and Evaluation    Patient location during evaluation: bedside  Patient participation: complete - patient participated  Level of consciousness: awake  Pain score: 0  Pain management: adequate  Airway patency: patent  Anesthetic complications: No anesthetic complications  PONV Status: controlled  Cardiovascular status: acceptable and stable  Respiratory status: acceptable and room air  Hydration status: acceptable

## 2021-04-13 NOTE — PLAN OF CARE
Goal Outcome Evaluation:  Plan of Care Reviewed With: patient, spouse  Progress: no change  Outcome Summary: OT eval completed. Patient presents deficits in strength, endurance, balance, mobility and ADL performance following R LASHELL. Patient is expected to benefit from continued OT services prior to DC. Wants to DC home with HH and spouse assisting as needed. Will require a RW, BSC, SC and hip kit upon DC.

## 2021-04-13 NOTE — ANESTHESIA PROCEDURE NOTES
Peripheral Block      Patient location during procedure: OR  Reason for block: at surgeon's request and post-op pain management  Performed by  CRNA: Lloyd Gonsalves CRNA  Preanesthetic Checklist  Completed: patient identified, IV checked, site marked, risks and benefits discussed, surgical consent, monitors and equipment checked, pre-op evaluation and timeout performed  Prep:  Pt Position: supine  Sterile barriers:cap, gloves, mask, partial drape and washed/disinfected hands  Prep: ChloraPrep  Patient monitoring: blood pressure monitoring, continuous pulse oximetry and EKG  Procedure  Performed under: local infiltrationImages:still images obtained, printed/placed on chart    Laterality:right  Block Type:fascia iliaca compartment  Injection Technique:single-shot  Needle Type:echogenic  Needle Gauge:21 G  Resistance on Injection: none          Medications  Comment:Marcaine 0.5% 40 ml decadron 10 mg    Post Assessment  Injection Assessment: negative aspiration for heme, no paresthesia on injection and incremental injection  Patient Tolerance:comfortable throughout block

## 2021-04-13 NOTE — OP NOTE
[place] [address] [facility], [address]  [place]  [address] [phone]      OPERATIVE REPORT         PATIENT NAME:  John Leyva                            YOB: 1945        PREOP DIAGNOSIS:   Right hip advanced end-stage osteoarthritis    POSTOP DIAGNOSIS:   Right hip advanced end-stage osteoarthritis    PROCEDURE:   Right total hip replacement.    SURGEON:    Colin Carlson MD    OPERATIVE TEAM:   Circulator: Jonna Arguello RN; Sylwia Payton RN  Scrub Person: Dejan Tobin; Katlyn Brito; Jan Malin    ANESTHETIST:  CRNA: Joe Martinez CRNA    ANESTHESIA:   Spinal    FLUIDS:   2200 ml crystalloid    ESTIMATED BLOOD LOSS: 200 ml      URINE OUTPUT:  100 ml    SPECIMENS:   Femoral head sent to Pathology.    DRAINS:   Stein to gravity.    FINDINGS: Severe degenerative arthritis, osteophytes, erosions, cysts, marked deformity of femoral head with osteophytes, deformity and large osteophyte spurs of acetabulum, calcified degenerative labrum with tears and bone-on-bone wear.    HARDWARE:                       Bi[employer] Taperloc Complete press fit total hip  replacement with a #14 stem, 133 degree neck angle, -3 mm neck, 36 mm ceramic  head, 52 mm acetabular cup with two superior quadrant screws (30 mm, 25 mm)  and 10 degree posterior high wall cross-linked polyethylene liner.      COMPLICATIONS:  None.    DISPOSITION:  Stable to recovery.    INDICATIONS/NARRATIVE:   The patient presents for planned total hip replacement.  The patient has persistent daily pain, limited ambulation and activity intolerance, hip stiffness, deformity, and limitations related to advanced degenerative joint disease of the hip.  Treatment alternatives have been discussed, and the patient wishes to proceed with elective total hip replacement.  Risks and benefits of the proposed procedure have been discussed, and informed consent obtained.  Risks were discussed including but not  limited to, anesthesia, infection, nerve/vessel/tendon injury, fracture, prosthetic wear, loosening or dislocation, DVT, pulmonary embolus, blood loss and the possible need for transfusion.  Arrangements have been made for tranexamic acid IV protocol.  Goals of the procedure include the potential for pain relief, improved hip motion, limb alignment and improved tolerance with ambulation and activities.      Antibiotic prophylaxis was given.  Surgeon site marking and a time out were performed.  Anesthesia was effective and well tolerated.  The hip and leg were prepped and draped in the usual sterile fashion.  The patient was placed in the lateral decubitus position for the procedure, with care taken to pad all areas of the body including a bean bag mold, axillary roll, and fibular head and malleolar padding.      After sterile prep and drape, a curved incision was made centered on the greater trochanter of the hip.  Dissection proceeded in line with the skin incision and through the tensor fascia estephania.  A Charnley self-retaining retractor was placed.  The hip was gently internally rotated and a blunt Cobra was placed under the gluteus medius.  The piriformis tendon and short external rotators were released from the fossa and tagged for subsequent repair.  A T capsulotomy was performed and the capsule was tagged for repair.  Synovial joint fluid expressed and suctioned.  There were marked degenerative changes, cartilage worn down to bone and a deformed femoral head and acetabulum with large marginal osteophytes.  Using the neck-cutting template, with the desired slope and position, a neck cut was made with a saw.  The femoral head was extracted from the hip, and sent to pathology as a specimen.      After debridement of the osteophytes and degenerative labrum, reaming of the acetabulum was performed that provided a concentric acetabular socket for the implant.  A trial cup provided an excellent press fit with no  toggling.  Care was taken to ream and place the cup in 15-20 degrees of anteversion and 45-50 degrees of inclination.  The trial component was removed, and the acetabulum was irrigated copiously with pulsed antibiotic solution.  Then, the actual acetabular shell was impacted in place and obtained an excellent press fit, with good position.  The fixation was reinforced with superior quadrant screws, with standard drilling depth gauge measurement, and placement of screws.  Then, a trial liner was placed onto the cup.    Next, steps were taken to prepare the femur.  A box-cutting osteotome was used to lateralize the entry to the canal and along the greater trochanter.  Sequential broaching with the broach was performed, up to the best-fit sizes, which provided an excellent press fit, no toggling.  Care was taken to broach 15-20 degrees of femoral anteversion.  Next, trial heads and necks were placed to find the best range of motion and stability.  There was smooth, free flexion, full extension of the hip and smooth full external and internal rotation with stability.  The trial components were removed.  The wound and bone surfaces were pulse irrigated with copious antibiotic solution, and then suctioned.  Next, the actual stem was placed, which had an excellent press fit that was in good position.  The trial cup liner was removed and the actual insert was placed.  The actual head was then Prakash tapered onto the femoral stem.  A final reduction was performed.  Clinically the leg lengths were equal and there was full range of motion and stability of the hip.  The wound was irrigated copiously.      Routine closure was performed and consisted of interrupted #1 Vicryl to repair the capsule, #5 non-absorbable Ethibond to repair the piriformis tendon, #1 Vicryl to repair the tensor fascia estephania, 2-0 Vicryl for the deep and superficial subcutaneous layers, and staples for the skin.  A sterile Aquacel dressing was applied.  A  hip abduction pillow was placed and the patient was moved supine on the hospital bed.  Anesthesia was effective and well tolerated.  There were no complications of surgery.  The patient was transferred in stable condition to the recovery room and x-rays of the pelvis and hip were requested.

## 2021-04-13 NOTE — INTERVAL H&P NOTE
H&P reviewed. The patient was examined and there are no changes to the H&P, inclusive of the physical exam heart, lungs and procedure site specific exam as noted on the current (within 30 days) history and physical full detailed report.    Vitals:    04/13/21 0650   BP: 158/90   Pulse: 66   Resp: 16   Temp: 97.4 °F (36.3 °C)   SpO2: 95%     Jose Carlson MD  4/13/2021  07:50 EDT

## 2021-04-13 NOTE — ANESTHESIA PREPROCEDURE EVALUATION
Anesthesia Evaluation     Patient summary reviewed and Nursing notes reviewed   no history of anesthetic complications:  NPO Solid Status: > 8 hours  NPO Liquid Status: > 8 hours           Airway   Mallampati: I  TM distance: >3 FB  Neck ROM: full  no difficulty expected  Dental - normal exam     Pulmonary - negative pulmonary ROS and normal exam   Cardiovascular - normal exam    (+) hypertension, hyperlipidemia,       Neuro/Psych- negative ROS  GI/Hepatic/Renal/Endo    (+)   renal disease, diabetes mellitus,     Musculoskeletal (-) negative ROS    Abdominal    Substance History - negative use     OB/GYN negative ob/gyn ROS         Other - negative ROS                       Anesthesia Plan    ASA 3     spinal and regional     intravenous induction     Anesthetic plan, all risks, benefits, and alternatives have been provided, discussed and informed consent has been obtained with: patient.

## 2021-04-13 NOTE — PLAN OF CARE
Goal Outcome Evaluation:        Outcome Summary: Pt was a new admit to the floor this afternoon. VSS att. Pain controlled with PRN pain medicine, see MAR. Pt sitting up in the chair with no complaints att. Will continue to monitor.

## 2021-04-13 NOTE — ANESTHESIA PROCEDURE NOTES
Spinal Block      Patient location during procedure: OR  Indication:at surgeon's request  Performed By  CRNA: Joe Martinez CRNA  Preanesthetic Checklist  Completed: patient identified, IV checked, site marked, risks and benefits discussed, surgical consent, monitors and equipment checked, pre-op evaluation and timeout performed  Spinal Block Prep:  Patient Position:sitting  Sterile Tech:cap, gloves, gown, mask and sterile barriers  Prep:Chloraprep  Patient Monitoring:blood pressure monitoring and continuous pulse oximetry  Spinal Block Procedure  Approach:midline  Guidance:palpation technique  Location:L3-L4  Needle Type:Pencan  Needle Gauge:25 G  Placement of Spinal needle event:cerebrospinal fluid aspirated    Fluid Appearance:clear  Medications: bupivacaine PF (MARCAINE) injection 0.75%, 2 mL   Post Assessment  Patient Tolerance:patient tolerated the procedure well with no apparent complications  Complications no

## 2021-04-13 NOTE — CONSULTS
[facility] HOSPITALIST   CONSULTATION      Name:  John Leyva   Age:  75 y.o.  Sex:  male  :  1945  MRN:  [id number]   Visit Number:  [id number]  Admission Date:  2021  Date Of Service:  21  Primary Care Physician:  Mahesh Almanza MD    Consulting Physician:    POP Talley    Referring Physician:    Dr. Carlson     Reason For Consult:    Medical Management    Chief Complaint:     S/P Right total hip replacement    History Of Presenting Illness:      Patient had right total hip replacement today with Dr. Carlson.  He has had persistent and progressive right hip pain, stiffness and joint grinding/crepitus with activity limitations.  Prior to surgery, patient has had treatments for his hip pain including exercises, medication and injection therapy without relief.      Hospitalist team consulted for medical management.  His past medical history is significant for borderline diabetes, BPH, gout, hypertension, hypothyroidism, hyperlipidemia, arthritis, lumbar spine surgery, retinal detachment surgery x 3, and cataract extraction.  Patient does use a cane when walking outside his home and states that he does fall frequently due to losing his balance.  He reports having a walker and an elevated toilet seat at home already.      Review Of Systems:     General ROS: Patient denies any fevers, chills or loss of consciousness.   Psychological ROS: Denies any hallucinations and delusions.  Ophthalmic ROS: Denies any diplopia or transient loss of vision.  ENT ROS: Denies sore throat, nasal congestion or ear pain.   Allergy and Immunology ROS: Denies rash or itching.  Hematological and Lymphatic ROS: Denies neck swelling or easy bleeding.  Endocrine ROS: Denies any recent unintentional weight gain or loss.  Respiratory ROS: Denies cough or shortness of breath.   Cardiovascular ROS: Denies chest pain or palpitations. No history of exertional chest pain.   Gastrointestinal ROS: Denies  nausea and vomiting. Denies any abdominal pain. No diarrhea.   Genitourinary ROS: Denies dysuria or hematuria.  Musculoskeletal ROS: + for post operative incisional right hip pain. No muscle pain. No calf pain.   Neurological ROS: Denies any focal weakness. No loss of consciousness. Denies any numbness.   Dermatological ROS: Denies any redness or pruritis.     Past Medical History:    Past Medical History:   Diagnosis Date   • Arthritis    • BPH (benign prostatic hyperplasia)    • Cataract, bilateral     s/p surgery   • Diabetes mellitus (CMS/HCC)     no medications    • ED (erectile dysfunction)    • Elevated cholesterol    • Elevated PSA    • Gout    • Hypertension    • Hypogonadism in male    • Hypothyroidism     no medications   • Low kidney function    • Wears glasses        Past Surgical history:    Past Surgical History:   Procedure Laterality Date   • APPENDECTOMY     • COLONOSCOPY     • ELBOW OLECRANNON BURSA EXCISION Right    • EYE SURGERY Bilateral     cataracts removed   • LUMBAR LAMINECTOMY DISCECTOMY DECOMPRESSION Right 2020    Procedure: LUMBAR LAMINECTOMY L4-5, HEMILAMINECTOMY L3-4 RIGHT;  Surgeon: Paulie Daniels MD;  Location: [facility];  Service: Neurosurgery;  Laterality: Right;   • RETINAL DETACHMENT SURGERY Right     x3 surgeries   • SKIN BIOPSY      benign       Social History:    Social History     Socioeconomic History   • Marital status:      Spouse name: Yolanda   • Number of children: Not on file   • Years of education: Not on file   • Highest education level: Not on file   Tobacco Use   • Smoking status: Former Smoker     Packs/day: 3.00     Years: 10.00     Pack years: 30.00     Types: Cigarettes     Quit date:      Years since quittin.3   • Smokeless tobacco: Never Used   Vaping Use   • Vaping Use: Never used   Substance and Sexual Activity   • Alcohol use: Not Currently   • Drug use: No   • Sexual activity: Defer       Family History:    Family History   Problem  Relation Age of Onset   • Cancer Other    • Cancer Mother    • Cancer Father         Bone cancer       Allergies:      Patient has no known allergies.    Home Medications:    Prior to Admission Medications     Prescriptions Last Dose Informant Patient Reported? Taking?    allopurinol (ZYLOPRIM) 100 MG tablet 4/12/2021 Self No Yes    TAKE ONE TABLET BY MOUTH EVERY DAY    Patient taking differently:  Take 100 mg by mouth Daily.    amLODIPine (NORVASC) 5 MG tablet 4/12/2021 Self No Yes    Take 1 tablet by mouth Daily.    atorvastatin (LIPITOR) 20 MG tablet 4/12/2021 Self No Yes    TAKE ONE TABLET BY MOUTH EVERY DAY    Patient taking differently:  Take 20 mg by mouth Daily.    Cholecalciferol (Vitamin D) 50 MCG (2000 UT) capsule 4/12/2021 Self Yes Yes    Take 2,000 Units by mouth Daily.    doxazosin (CARDURA) 2 MG tablet 4/12/2021 Self No Yes    TAKE ONE TABLET BY MOUTH EVERY NIGHT AT BEDTIME    Patient taking differently:  Take 2 mg by mouth Every Night.    DULoxetine (CYMBALTA) 30 MG capsule 4/12/2021 Self No Yes    TAKE ONE CAPSULE BY MOUTH EVERY DAY    Patient taking differently:  Take 30 mg by mouth Daily.    finasteride (PROSCAR) 5 MG tablet 4/12/2021 Self No Yes    Take 1 tablet by mouth Daily.    lisinopril-hydrochlorothiazide (PRINZIDE,ZESTORETIC) 10-12.5 MG per tablet 4/12/2021 Self No Yes    Take 1 tablet by mouth Daily. Patient is due to be seen    metoprolol succinate XL (TOPROL-XL) 100 MG 24 hr tablet 4/13/2021 Self No Yes    TAKE ONE TABLET BY MOUTH EVERY DAY    Multiple Vitamins-Minerals (MULTIVITAMIN ADULT PO) 4/12/2021 Self Yes Yes    Take 1 tablet by mouth Daily.             Hospital Scheduled Meds:    allopurinol, 100 mg, Oral, Daily  amLODIPine, 5 mg, Oral, Daily  atorvastatin, 20 mg, Oral, Nightly  ceFAZolin, 1 g, Intravenous, Q8H  cholecalciferol, 500 Units, Oral, Daily  docusate sodium, 100 mg, Oral, BID  DULoxetine, 30 mg, Oral, Daily  [START ON 4/14/2021] enoxaparin, 40 mg, Subcutaneous,  Daily  finasteride, 5 mg, Oral, Daily  hydroCHLOROthiazide Oral, 12.5 mg, Oral, Daily  lisinopril, 10 mg, Oral, Daily  metoprolol succinate XL, 100 mg, Oral, Daily  multivitamin with minerals, 1 tablet, Oral, Daily  PRO-STAT, 30 mL, Oral, BID  sodium chloride, 3 mL, Intravenous, Q12H  terazosin, 2 mg, Oral, Nightly        lactated ringers, 100 mL/hr, Last Rate: 100 mL/hr (04/13/21 1155)         Vital Signs:    Temp:  [97.4 °F (36.3 °C)-98 °F (36.7 °C)] 97.6 °F (36.4 °C)  Heart Rate:  [54-66] 55  Resp:  [15-26] 19  BP: (107-158)/(49-90) 119/59    There were no vitals filed for this visit.    Body mass index is 37.68 kg/m².    Physical Exam:    General Appearance:  Alert and cooperative, not in any acute distress. Patient sitting on side of bed with PT at bedside.  Pleasant in conversation. Without complaints at time of exam.    Head:  Atraumatic and normocephalic, without obvious abnormality.   Eyes:          PERRLA, conjunctivae and sclerae normal, no icterus. No pallor. Extraocular movements are within normal limits.   Ears:  Ears appear intact with no abnormalities noted.   Throat: No oral lesions, no thrush, oral mucosa moist.   Neck: Supple, trachea midline.   Back:   No kyphoscoliosis present. No tenderness to palpation,   range of motion normal.   Lungs:   Chest shape is normal. Breath sounds heard bilaterally equally.  No crackles or wheezing. No Pleural rub or bronchial breathing.   Heart:  Normal S1 and S2, no murmur, no gallop, no rub. No JVD.   Abdomen:   Normal bowel sounds, no masses, no organomegaly. Soft, nontender, nondistended, no guarding, no rebound tenderness.   Extremities: Moves all extremities, no edema, no cyanosis, no clubbing.   Pulses: Pulses palpable and equal bilaterally.   Skin: No bleeding, bruising or rash.   Neurologic: Alert and oriented x 3. Moves all four limbs equally. No tremors. No facial asymmetry.     Laboratory data:    Results from last 7 days   Lab Units 04/09/21  1109    SODIUM mmol/L 134*   POTASSIUM mmol/L 4.6   CHLORIDE mmol/L 99   CO2 mmol/L 24.7   BUN mg/dL 11   CREATININE mg/dL 1.03   CALCIUM mg/dL 9.7   BILIRUBIN mg/dL 0.9   ALK PHOS U/L 126*   ALT (SGPT) U/L 20   AST (SGOT) U/L 18   GLUCOSE mg/dL 115*     Results from last 7 days   Lab Units 04/09/21  1109   WBC 10*3/mm3 10.71   HEMOGLOBIN g/dL 16.3   HEMATOCRIT % 48.2   PLATELETS 10*3/mm3 260     Results from last 7 days   Lab Units 04/09/21  1109   INR  0.91     Results from last 7 days   Lab Units 04/09/21  1109   CK TOTAL U/L 177                     Results from last 7 days   Lab Units 04/09/21  1109   COLOR UA  Yellow   GLUCOSE UA  Negative   KETONES UA  Negative   LEUKOCYTES UA  Negative   PH, URINE  7.5   BILIRUBIN UA  Negative   UROBILINOGEN UA  0.2 E.U./dL     Pain Management Panel     Pain Management Panel 4/1/2021    CREATININE UR 50        Results from last 7 days   Lab Units 04/09/21  1109   MRSACX  No Methicillin Resistant Staphylococcus aureus isolated       Radiology:    Imaging Results (Last 72 Hours)     Procedure Component Value Units Date/Time    XR Pelvis 1 or 2 View [[id number]] Collected: 04/13/21 1132     Updated: 04/13/21 1132    Narrative:      PROCEDURE: XR PELVIS 1 OR 2 VW-     HISTORY: AP pelvis status post right total hip replacement;  M16.11-Unilateral primary osteoarthritis, right hip; M25.551-Pain in  right hip     COMPARISON: March 29, 2021.     FINDINGS:  There are postsurgical changes of total right hip  arthroplasty. No hardware complications are identified.         Impression:      Postsurgical changes as above.           Images were reviewed, interpreted, and dictated by Dr. Javad Doherty M.D.  Transcribed by Liss Cadena PA-C.    US Ha OR Procedure [[id number]] Resulted: 04/13/21 1045     Updated: 04/13/21 1045    US Ha OR Procedure [[id number]] Resulted: 04/13/21 1036     Updated: 04/13/21 1036          Assessment:     S/P right total hip replacement  Primary  osteoarthritis of right hip  Borderline diabetes   Hypertension  Hyperlipidemia    Recommendations/Plan:     Will monitor patient closely for post-operative bleeding and pain.  I assessed the patient post-operatively in his room.  PT and OT at bedside to work with patient.  Continue with ordered pain management. Continue home meds as ordered Patient plans to return home with home health at discharge.  He has a walker and elevated commode seat at home.  Agree with excellent care being provided at this time.  At this time I will sign off.  Please contact me with any further needs.     Yesica Peña, APRN  04/13/21  14:32 EDT    Dictated utilizing Dragon dictation.

## 2021-04-14 DIAGNOSIS — Z96.641 S/P HIP REPLACEMENT, RIGHT: Primary | ICD-10-CM

## 2021-04-14 LAB
ANION GAP SERPL CALCULATED.3IONS-SCNC: 8.9 MMOL/L (ref 5–15)
BASOPHILS # BLD AUTO: 0.02 10*3/MM3 (ref 0–0.2)
BASOPHILS NFR BLD AUTO: 0.1 % (ref 0–1.5)
BUN SERPL-MCNC: 19 MG/DL (ref 8–23)
BUN/CREAT SERPL: 18.8 (ref 7–25)
CALCIUM SPEC-SCNC: 8.8 MG/DL (ref 8.6–10.5)
CHLORIDE SERPL-SCNC: 101 MMOL/L (ref 98–107)
CO2 SERPL-SCNC: 25.1 MMOL/L (ref 22–29)
CREAT SERPL-MCNC: 1.01 MG/DL (ref 0.76–1.27)
DEPRECATED RDW RBC AUTO: 43.2 FL (ref 37–54)
EOSINOPHIL # BLD AUTO: 0 10*3/MM3 (ref 0–0.4)
EOSINOPHIL NFR BLD AUTO: 0 % (ref 0.3–6.2)
ERYTHROCYTE [DISTWIDTH] IN BLOOD BY AUTOMATED COUNT: 13.2 % (ref 12.3–15.4)
GFR SERPL CREATININE-BSD FRML MDRD: 72 ML/MIN/1.73
GLUCOSE SERPL-MCNC: 158 MG/DL (ref 65–99)
HCT VFR BLD AUTO: 40.6 % (ref 37.5–51)
HGB BLD-MCNC: 13.9 G/DL (ref 13–17.7)
IMM GRANULOCYTES # BLD AUTO: 0.08 10*3/MM3 (ref 0–0.05)
IMM GRANULOCYTES NFR BLD AUTO: 0.5 % (ref 0–0.5)
LYMPHOCYTES # BLD AUTO: 1.86 10*3/MM3 (ref 0.7–3.1)
LYMPHOCYTES NFR BLD AUTO: 11.5 % (ref 19.6–45.3)
MCH RBC QN AUTO: 30.6 PG (ref 26.6–33)
MCHC RBC AUTO-ENTMCNC: 34.2 G/DL (ref 31.5–35.7)
MCV RBC AUTO: 89.4 FL (ref 79–97)
MONOCYTES # BLD AUTO: 0.8 10*3/MM3 (ref 0.1–0.9)
MONOCYTES NFR BLD AUTO: 4.9 % (ref 5–12)
NEUTROPHILS NFR BLD AUTO: 13.43 10*3/MM3 (ref 1.7–7)
NEUTROPHILS NFR BLD AUTO: 83 % (ref 42.7–76)
NRBC BLD AUTO-RTO: 0 /100 WBC (ref 0–0.2)
PLATELET # BLD AUTO: 239 10*3/MM3 (ref 140–450)
PMV BLD AUTO: 9.9 FL (ref 6–12)
POTASSIUM SERPL-SCNC: 4.2 MMOL/L (ref 3.5–5.2)
RBC # BLD AUTO: 4.54 10*6/MM3 (ref 4.14–5.8)
SODIUM SERPL-SCNC: 135 MMOL/L (ref 136–145)
WBC # BLD AUTO: 16.19 10*3/MM3 (ref 3.4–10.8)

## 2021-04-14 PROCEDURE — 63710000001 MELATONIN 5 MG TABLET: Performed by: INTERNAL MEDICINE

## 2021-04-14 PROCEDURE — 25010000003 CEFAZOLIN SODIUM-DEXTROSE 1-4 GM-%(50ML) RECONSTITUTED SOLUTION: Performed by: ORTHOPAEDIC SURGERY

## 2021-04-14 PROCEDURE — 63710000001 ATORVASTATIN 20 MG TABLET: Performed by: ORTHOPAEDIC SURGERY

## 2021-04-14 PROCEDURE — A9270 NON-COVERED ITEM OR SERVICE: HCPCS | Performed by: ORTHOPAEDIC SURGERY

## 2021-04-14 PROCEDURE — 25010000002 ENOXAPARIN PER 10 MG: Performed by: ORTHOPAEDIC SURGERY

## 2021-04-14 PROCEDURE — 97110 THERAPEUTIC EXERCISES: CPT

## 2021-04-14 PROCEDURE — 63710000001 DULOXETINE 30 MG CAPSULE DELAYED-RELEASE PARTICLES: Performed by: ORTHOPAEDIC SURGERY

## 2021-04-14 PROCEDURE — 80048 BASIC METABOLIC PNL TOTAL CA: CPT | Performed by: ORTHOPAEDIC SURGERY

## 2021-04-14 PROCEDURE — 63710000001 METOPROLOL SUCCINATE XL 100 MG TABLET SUSTAINED-RELEASE 24 HOUR: Performed by: ORTHOPAEDIC SURGERY

## 2021-04-14 PROCEDURE — 63710000001 FINASTERIDE 5 MG TABLET: Performed by: ORTHOPAEDIC SURGERY

## 2021-04-14 PROCEDURE — A9270 NON-COVERED ITEM OR SERVICE: HCPCS | Performed by: INTERNAL MEDICINE

## 2021-04-14 PROCEDURE — 85025 COMPLETE CBC W/AUTO DIFF WBC: CPT | Performed by: ORTHOPAEDIC SURGERY

## 2021-04-14 PROCEDURE — 63710000001 LISINOPRIL 10 MG TABLET: Performed by: ORTHOPAEDIC SURGERY

## 2021-04-14 PROCEDURE — G0378 HOSPITAL OBSERVATION PER HR: HCPCS

## 2021-04-14 PROCEDURE — 99024 POSTOP FOLLOW-UP VISIT: CPT | Performed by: PHYSICIAN ASSISTANT

## 2021-04-14 PROCEDURE — 63710000001 AMLODIPINE 5 MG TABLET: Performed by: ORTHOPAEDIC SURGERY

## 2021-04-14 PROCEDURE — 63710000001 CHOLECALCIFEROL 25 MCG (1000 UT) TABLET: Performed by: ORTHOPAEDIC SURGERY

## 2021-04-14 PROCEDURE — 63710000001 DOCUSATE SODIUM 100 MG CAPSULE: Performed by: ORTHOPAEDIC SURGERY

## 2021-04-14 PROCEDURE — 63710000001 ALLOPURINOL 100 MG TABLET: Performed by: ORTHOPAEDIC SURGERY

## 2021-04-14 PROCEDURE — 63710000001 MULTIVITAMIN WITH MINERALS TABLET: Performed by: ORTHOPAEDIC SURGERY

## 2021-04-14 PROCEDURE — 63710000001 TERAZOSIN 1 MG CAPSULE: Performed by: ORTHOPAEDIC SURGERY

## 2021-04-14 PROCEDURE — 63710000001 HYDROCHLOROTHIAZIDE 12.5 MG CAPSULE: Performed by: ORTHOPAEDIC SURGERY

## 2021-04-14 PROCEDURE — 63710000001 PRO-STAT LIQUID: Performed by: ORTHOPAEDIC SURGERY

## 2021-04-14 PROCEDURE — 97530 THERAPEUTIC ACTIVITIES: CPT

## 2021-04-14 PROCEDURE — 97535 SELF CARE MNGMENT TRAINING: CPT

## 2021-04-14 PROCEDURE — 97116 GAIT TRAINING THERAPY: CPT

## 2021-04-14 RX ORDER — CHOLECALCIFEROL (VITAMIN D3) 125 MCG
10 CAPSULE ORAL NIGHTLY PRN
Status: DISCONTINUED | OUTPATIENT
Start: 2021-04-14 | End: 2021-04-15 | Stop reason: HOSPADM

## 2021-04-14 RX ORDER — HYDROCODONE BITARTRATE AND ACETAMINOPHEN 7.5; 325 MG/1; MG/1
1 TABLET ORAL EVERY 6 HOURS PRN
Qty: 28 TABLET | Refills: 0 | Status: SHIPPED | OUTPATIENT
Start: 2021-04-14 | End: 2021-07-12

## 2021-04-14 RX ADMIN — DOCUSATE SODIUM 100 MG: 100 CAPSULE, LIQUID FILLED ORAL at 08:09

## 2021-04-14 RX ADMIN — METOPROLOL SUCCINATE 100 MG: 100 TABLET, EXTENDED RELEASE ORAL at 08:09

## 2021-04-14 RX ADMIN — Medication 30 ML: at 20:47

## 2021-04-14 RX ADMIN — SODIUM CHLORIDE, PRESERVATIVE FREE 3 ML: 5 INJECTION INTRAVENOUS at 08:08

## 2021-04-14 RX ADMIN — ENOXAPARIN SODIUM 40 MG: 40 INJECTION SUBCUTANEOUS at 08:08

## 2021-04-14 RX ADMIN — SODIUM CHLORIDE, PRESERVATIVE FREE 3 ML: 5 INJECTION INTRAVENOUS at 20:48

## 2021-04-14 RX ADMIN — TERAZOSIN HYDROCHLORIDE 2 MG: 1 CAPSULE ORAL at 20:46

## 2021-04-14 RX ADMIN — Medication 30 ML: at 08:08

## 2021-04-14 RX ADMIN — ALLOPURINOL 100 MG: 100 TABLET ORAL at 08:09

## 2021-04-14 RX ADMIN — Medication 10 MG: at 23:58

## 2021-04-14 RX ADMIN — DOCUSATE SODIUM 100 MG: 100 CAPSULE, LIQUID FILLED ORAL at 20:46

## 2021-04-14 RX ADMIN — LISINOPRIL 10 MG: 10 TABLET ORAL at 08:09

## 2021-04-14 RX ADMIN — FINASTERIDE 5 MG: 5 TABLET, FILM COATED ORAL at 08:08

## 2021-04-14 RX ADMIN — AMLODIPINE BESYLATE 5 MG: 5 TABLET ORAL at 08:09

## 2021-04-14 RX ADMIN — Medication 1 TABLET: at 08:08

## 2021-04-14 RX ADMIN — Medication 500 UNITS: at 08:08

## 2021-04-14 RX ADMIN — SODIUM CHLORIDE, POTASSIUM CHLORIDE, SODIUM LACTATE AND CALCIUM CHLORIDE 100 ML/HR: 600; 310; 30; 20 INJECTION, SOLUTION INTRAVENOUS at 16:19

## 2021-04-14 RX ADMIN — HYDROCHLOROTHIAZIDE 12.5 MG: 12.5 CAPSULE ORAL at 08:09

## 2021-04-14 RX ADMIN — DULOXETINE HYDROCHLORIDE 30 MG: 30 CAPSULE, DELAYED RELEASE ORAL at 08:09

## 2021-04-14 RX ADMIN — CEFAZOLIN SODIUM 1 G: 1 SOLUTION INTRAVENOUS at 01:04

## 2021-04-14 RX ADMIN — ATORVASTATIN CALCIUM 20 MG: 20 TABLET, FILM COATED ORAL at 20:46

## 2021-04-14 NOTE — PLAN OF CARE
Goal Outcome Evaluation:  Plan of Care Reviewed With: patient  Progress: improving  Outcome Summary: PT tx completed. Patient performs bed mobility with conditional independence and sit to stand/toilet transfer with RW and SBA. Able to ambulate 156' with RW, v/c and CGA. Performs LE ther ex as indicated on care map and able to verbalize LASHELL precautions. Cont to goals.

## 2021-04-14 NOTE — PLAN OF CARE
Goal Outcome Evaluation:  Plan of Care Reviewed With: patient, spouse  Progress: improving  Outcome Summary: PT pm tx completed. Patient able to perform LE ther ex as indicated on care map. Able to ambulate 212' with RW and SBA. Educate and demonstrate stairs wit patient in the am. Cont to goals.

## 2021-04-14 NOTE — PROGRESS NOTES
Discharge Planning Assessment  [facility]     Patient Name: John Leyva  MRN: [id number]  Today's Date: 4/14/2021    Admit Date: 4/13/2021    Discharge Needs Assessment     Row Name 04/14/21 1656       Living Environment    Lives With  spouse    Current Living Arrangements  home/apartment/condo    Primary Care Provided by  self    Provides Primary Care For  no one    Family Caregiver if Needed  spouse    Able to Return to Prior Arrangements  yes       Resource/Environmental Concerns    Resource/Environmental Concerns  none    Transportation Concerns  car, none       Transition Planning    Patient/Family Anticipates Transition to  home with help/services;home with family    Patient/Family Anticipated Services at Transition  none       Discharge Needs Assessment    Readmission Within the Last 30 Days  no previous admission in last 30 days    Equipment Currently Used at Home  cane, straight    Concerns to be Addressed  no discharge needs identified    Anticipated Changes Related to Illness  none    Equipment Needed After Discharge  walker, rolling;commode        Discharge Plan     Row Name 04/14/21 9460       Plan    Plan Met with patient in room.  Demographic information verified. No POA/LW. Patient is fully independent, plans on going home-  he said he would rather have in home physical therapy than have to go to outpatient therapy.  Enrolled in meds to beds, MOON, and CM info card given to patient.  Encouraged to call with any needs.  Wife is to transport home.   Pt requested  rolling walker and  bedside commode.        Continued Care and Services - Admitted Since 4/13/2021     Durable Medical Equipment Coordination complete    Service Provider Request Status Selected Services Address Phone Fax Patient Preferred    [facility]   Selected Durable Medical Equipment [address] [place] [address] [place] [id number] [phone] --                Demographic Summary     Row Name 04/14/21 1646       General  Information    Admission Type  observation    Arrived From  home    Required Notices Provided  Observation Status Notice    Referral Source  admission list    Reason for Consult  discharge planning    Preferred Language  English     Used During This Interaction  no        Functional Status     Row Name 04/14/21 1647       Functional Status    Usual Activity Tolerance  excellent    Current Activity Tolerance  good       Functional Status, IADL    Medications  independent    Meal Preparation  independent    Housekeeping  independent    Laundry  independent    Shopping  independent       Mental Status    General Appearance WDL  WDL       Mental Status Summary    Recent Changes in Mental Status/Cognitive Functioning  no changes       Employment/    Employment Status  retired        Psychosocial     Row Name 04/14/21 1647       Values/Beliefs    Spiritual, Cultural Beliefs, Voodoo Practices, Values that Affect Care  no       Behavior WDL    Behavior WDL  WDL       Emotion Mood WDL    Emotion/Mood/Affect WDL  WDL       Speech WDL    Speech WDL  WDL       Perceptual State WDL    Perceptual State WDL  WDL       Thought Process WDL    Thought Process WDL  WDL       Intellectual Performance WDL    Intellectual Performance WDL  WDL       Coping/Stress    Major Change/Loss/Stressor  none    Patient Personal Strengths  able to adapt;humor;strong support system    Sources of Support  spouse    Reaction to Health Status  accepting    Understanding of Condition and Treatment  adequate understanding of medical condition;adequate understanding of treatment       Developmental Stage (Eriksson's)    Developmental Stage  Stage 8 (65 years-death/Late Adulthood) Integrity vs. Despair       C-SSRS (Recent)    Q1 Wished to be Dead (Past Month)  no    Q2 Suicidal Thoughts (Past Month)  no    Q6 Suicide Behavior (Lifetime)  no       Violence Risk    Feels Like Hurting Others  no    Previous Attempt to Harm Others  no         Abuse/Neglect     Row Name 04/14/21 1651       Personal Safety    Feels Unsafe at Home or Work/School  no    Feels Threatened by Someone  no    Does Anyone Try to Keep You From Having Contact with Others or Doing Things Outside Your Home?  no    Physical Signs of Abuse Present  no        Legal    No documentation.       Substance Abuse    No documentation.       Patient Forms    No documentation.           Kayla Michael RN

## 2021-04-14 NOTE — PLAN OF CARE
Goal Outcome Evaluation:        Outcome Summary: VSS att. Pt maintaining on room air, no complaints of pain this shift, Ambulated in the room and in the nathan this shift. Will continue to monitor.

## 2021-04-14 NOTE — PLAN OF CARE
Goal Outcome Evaluation:  Plan of Care Reviewed With: patient  Progress: improving  Outcome Summary: Pt resting some tonight. No complaints of pain so far. Nurse encouraging IS use. Skuds and abducter pillow in use.

## 2021-04-14 NOTE — THERAPY TREATMENT NOTE
Patient Name: John Leyva  : 1945    MRN: [id number]                              Today's Date: 2021       Admit Date: 2021    Visit Dx:     ICD-10-CM ICD-9-CM   1. Status post total hip replacement, right  Z96.641 V43.64   2. Primary osteoarthritis of right hip  M16.11 715.15   3. Arthralgia of right hip  M25.551 719.45     Patient Active Problem List   Diagnosis   • Benign non-nodular prostatic hyperplasia   • Vitamin D deficiency   • Type 2 diabetes mellitus without complication (CMS/HCC)   • Primary gout   • Hypothyroidism   • Hyperlipidemia   • Essential hypertension   • Spinal stenosis of lumbar region with neurogenic claudication   • Primary osteoarthritis of right hip   • Arthralgia of right hip   • Ventral hernia without obstruction or gangrene   • Status post total hip replacement, right     Past Medical History:   Diagnosis Date   • Arthritis    • BPH (benign prostatic hyperplasia)    • Cataract, bilateral     s/p surgery   • Diabetes mellitus (CMS/HCC)     no medications    • ED (erectile dysfunction)    • Elevated cholesterol    • Elevated PSA    • Gout    • Hypertension    • Hypogonadism in male    • Hypothyroidism     no medications   • Impaired functional mobility, balance, gait, and endurance    • Low kidney function    • Wears glasses      Past Surgical History:   Procedure Laterality Date   • APPENDECTOMY     • COLONOSCOPY     • ELBOW OLECRANNON BURSA EXCISION Right    • EYE SURGERY Bilateral     cataracts removed   • LUMBAR LAMINECTOMY DISCECTOMY DECOMPRESSION Right 2020    Procedure: LUMBAR LAMINECTOMY L4-5, HEMILAMINECTOMY L3-4 RIGHT;  Surgeon: Paulie Daniels MD;  Location: [facility] OR;  Service: Neurosurgery;  Laterality: Right;   • RETINAL DETACHMENT SURGERY Right     x3 surgeries   • SKIN BIOPSY      benign   • TOTAL HIP ARTHROPLASTY Right 2021    Procedure: Elective total hip arthroplasty;  Surgeon: Jose Carlson MD;  Location: [facility] OR;  Service:  Orthopedics;  Laterality: Right;     General Information     Row Name 04/14/21 1256 04/14/21 0937       Physical Therapy Time and Intention    Document Type  therapy note (daily note)  -LM  therapy note (daily note)  -LM    Mode of Treatment  physical therapy  -LM  physical therapy  -LM    Row Name 04/14/21 1256 04/14/21 0937       General Information    Patient Profile Reviewed  yes  -LM  yes  -LM    Existing Precautions/Restrictions  fall;right;hip, posterior  -LM  right;hip, posterior;fall  -LM    Barriers to Rehab  --  none identified  -LM    Row Name 04/14/21 0937          Cognition    Orientation Status (Cognition)  oriented x 4  -LM     Row Name 04/14/21 1256 04/14/21 0937       Safety Issues, Functional Mobility    Safety Issues Affecting Function (Mobility)  safety precaution awareness;safety precautions follow-through/compliance  -LM  safety precaution awareness;safety precautions follow-through/compliance  -LM    Impairments Affecting Function (Mobility)  endurance/activity tolerance;strength;pain  -LM  endurance/activity tolerance;strength;pain  -LM      User Key  (r) = Recorded By, (t) = Taken By, (c) = Cosigned By    Initials Name Provider Type    LM Shira Jackson, LIZBET Physical Therapist        Mobility     Row Name 04/14/21 0937          Bed Mobility    Bed Mobility  supine-sit  -LM     Supine-Sit Williams (Bed Mobility)  modified independence  -LM     Assistive Device (Bed Mobility)  bed rails;head of bed elevated  -LM     Row Name 04/14/21 0937          Transfers    Comment (Transfers)  Toilet transfer with grab bar, RW and SBA  -LM     Row Name 04/14/21 1256 04/14/21 0937       Sit-Stand Transfer    Sit-Stand Williams (Transfers)  standby assist  -LM  standby assist  -LM    Assistive Device (Sit-Stand Transfers)  walker, front-wheeled  -LM  walker, front-wheeled  -LM    Row Name 04/14/21 1256 04/14/21 0937       Gait/Stairs (Locomotion)    Williams Level (Gait)  standby assist  -LM   contact guard;verbal cues  -LM    Assistive Device (Gait)  walker, front-wheeled  -LM  walker, front-wheeled  -LM    Distance in Feet (Gait)  212'  -LM  156'; v/c for correct sequencing and proper positioning of RW.  -LM    Deviations/Abnormal Patterns (Gait)  scarlet decreased;gait speed decreased;stride length decreased  -LM  scarlet decreased;gait speed decreased;stride length decreased  -LM    Bilateral Gait Deviations  weight shift ability decreased  -LM  weight shift ability decreased  -LM    Row Name 04/14/21 1256 04/14/21 0937       Mobility    Extremity Weight-bearing Status  right lower extremity  -LM  right lower extremity  -LM    Right Lower Extremity (Weight-bearing Status)  weight-bearing as tolerated (WBAT)  -LM  weight-bearing as tolerated (WBAT)  -LM      User Key  (r) = Recorded By, (t) = Taken By, (c) = Cosigned By    Initials Name Provider Type    LM Shira Jackson, PT Physical Therapist        Obj/Interventions     Row Name 04/14/21 1256 04/14/21 0937       Motor Skills    Therapeutic Exercise  other (see comments) AP, heel slides, QS, hip ABD/ADD, SLR  -LM  other (see comments) AP, heel slides, QS, hip ABD/ADD  -LM    Row Name 04/14/21 1256 04/14/21 0937       Balance    Static Sitting Balance  WFL;unsupported;sitting, edge of bed  -LM  WFL;unsupported;sitting, edge of bed  -LM    Dynamic Sitting Balance  WFL;unsupported;sitting, edge of bed  -LM  WFL;unsupported;sitting, edge of bed  -LM    Static Standing Balance  WFL;supported  -LM  WFL;supported  -LM    Dynamic Standing Balance  WFL;supported  -LM  WFL;supported  -LM      User Key  (r) = Recorded By, (t) = Taken By, (c) = Cosigned By    Initials Name Provider Type    LM Shira Jcakson, PT Physical Therapist        Goals/Plan    No documentation.       Clinical Impression     Row Name 04/14/21 1255 04/14/21 0937       Pain    Additional Documentation  Pain Scale: Numbers Pre/Post-Treatment (Group)  -LM  Pain Scale: Numbers Pre/Post-Treatment  (Group)  -LM    Row Name 04/14/21 1255 04/14/21 0937       Pain Scale: Numbers Pre/Post-Treatment    Pretreatment Pain Rating  0/10 - no pain  -LM  0/10 - no pain  -LM    Posttreatment Pain Rating  0/10 - no pain  -LM  0/10 - no pain  -LM    Row Name 04/14/21 1255 04/14/21 0937       Plan of Care Review    Plan of Care Reviewed With  patient;spouse  -LM  patient  -LM    Progress  improving  -LM  improving  -LM    Outcome Summary  PT pm tx completed. Patient able to perform LE ther ex as indicated on care map. Able to ambulate 212' with RW and SBA. Educate and demonstrate stairs wit patient in the am. Cont to goals.  -LM  PT tx completed. Patient performs bed mobility with conditional independence and sit to stand/toilet transfer with RW and SBA. Able to ambulate 156' with RW, v/c and CGA. Performs LE ther ex as indicated on care map and able to verbalize LASHELL precautions. Cont to goals.  -LM    Row Name 04/14/21 [facility] 04/14/21 0937       Therapy Assessment/Plan (PT)    Patient/Family Therapy Goals Statement (PT)  Return home.  -LM  Return home.  -LM    Rehab Potential (PT)  good, to achieve stated therapy goals  -LM  good, to achieve stated therapy goals  -LM    Row Name 04/14/21 125 04/14/21 0937       Vital Signs    O2 Delivery Pre Treatment  room air  -LM  room air  -LM    O2 Delivery Intra Treatment  room air  -LM  room air  -LM    O2 Delivery Post Treatment  room air  -LM  room air  -LM    Row Name 04/14/21 125 04/14/21 0937       Positioning and Restraints    Pre-Treatment Position  sitting in chair/recliner  -LM  in bed  -LM    Post Treatment Position  chair  -LM  chair  -LM    In Chair  reclined;call light within reach;encouraged to call for assist;with family/caregiver;pillow between legs  -LM  notified nsg;reclined;call light within reach;encouraged to call for assist;pillow between legs  -LM      User Key  (r) = Recorded By, (t) = Taken By, (c) = Cosigned By    Initials Name Provider Type    MARYLOU Jackson  Shira, LIZBET Physical Therapist        Outcome Measures     Row Name 04/14/21 1255 04/14/21 0937       How much help from another person do you currently need...    Turning from your back to your side while in flat bed without using bedrails?  4  -LM  4  -LM    Moving from lying on back to sitting on the side of a flat bed without bedrails?  4  -LM  4  -LM    Moving to and from a bed to a chair (including a wheelchair)?  4  -LM  3  -LM    Standing up from a chair using your arms (e.g., wheelchair, bedside chair)?  4  -LM  3  -LM    Climbing 3-5 steps with a railing?  3  -LM  2  -LM    To walk in hospital room?  3  -LM  3  -LM    AM-PAC 6 Clicks Score (PT)  22  -LM  19  -LM    Row Name 04/14/21 1255 04/14/21 0937       Functional Assessment    Outcome Measure Options  AM-PAC 6 Clicks Basic Mobility (PT)  -LM  AM-PAC 6 Clicks Basic Mobility (PT)  -LM      User Key  (r) = Recorded By, (t) = Taken By, (c) = Cosigned By    Initials Name Provider Type    Shira Coffey, PT Physical Therapist        Physical Therapy Education                 Title: PT OT SLP Therapies (In Progress)     Topic: Physical Therapy (Done)     Point: Mobility training (Done)     Learning Progress Summary           Patient Acceptance, E,TB, VU by  at 4/14/2021 1405    Comment: Ther ex for HEP; proper use of RW for safe transfers/ambulation; LASHELL precautions.    Acceptance, E,TB, VU by  at 4/14/2021 1136    Comment: Ther ex for HEP. Proper use of RW for safe transfers/ambulation. LASHELL precautions    Acceptance, E,TB, VU by  at 4/13/2021 1450    Comment: Purpose of PT/POC; LASHELL precautions; proper use of RW for safe transfers/ambulation; ther ex for HEP.   Family Acceptance, E,TB, VU by  at 4/14/2021 1405    Comment: Ther ex for HEP; proper use of RW for safe transfers/ambulation; LASHELL precautions.    Acceptance, E,TB, VU by  at 4/13/2021 1450    Comment: Purpose of PT/POC; LASHELL precautions; proper use of RW for safe transfers/ambulation; ther ex  for HEP.                   Point: Home exercise program (Done)     Learning Progress Summary           Patient Acceptance, E,TB, VU by LM at 4/14/2021 1405    Comment: Ther ex for HEP; proper use of RW for safe transfers/ambulation; LASHELL precautions.    Acceptance, E,TB, VU by LM at 4/14/2021 1136    Comment: Ther ex for HEP. Proper use of RW for safe transfers/ambulation. LASHELL precautions    Acceptance, E,TB, VU by LM at 4/13/2021 1450    Comment: Purpose of PT/POC; LASHELL precautions; proper use of RW for safe transfers/ambulation; ther ex for HEP.   Family Acceptance, E,TB, VU by LM at 4/14/2021 1405    Comment: Ther ex for HEP; proper use of RW for safe transfers/ambulation; LASHELL precautions.    Acceptance, E,TB, VU by LM at 4/13/2021 1450    Comment: Purpose of PT/POC; LASHELL precautions; proper use of RW for safe transfers/ambulation; ther ex for HEP.                   Point: Precautions (Done)     Learning Progress Summary           Patient Acceptance, E,TB, VU by LM at 4/14/2021 1405    Comment: Ther ex for HEP; proper use of RW for safe transfers/ambulation; LASHLEL precautions.    Acceptance, E,TB, VU by LM at 4/14/2021 1136    Comment: Ther ex for HEP. Proper use of RW for safe transfers/ambulation. LASHELL precautions    Acceptance, E,TB, VU by LM at 4/13/2021 1450    Comment: Purpose of PT/POC; LASHELL precautions; proper use of RW for safe transfers/ambulation; ther ex for HEP.   Family Acceptance, E,TB, VU by LM at 4/14/2021 1405    Comment: Ther ex for HEP; proper use of RW for safe transfers/ambulation; LASHELL precautions.    Acceptance, E,TB, VU by  at 4/13/2021 1450    Comment: Purpose of PT/POC; LASHELL precautions; proper use of RW for safe transfers/ambulation; ther ex for HEP.                               User Key     Initials Effective Dates Name Provider Type Discipline     04/03/18 -  Shira Jackson, PT Physical Therapist PT              PT Recommendation and Plan  Planned Therapy Interventions (PT): balance  training, bed mobility training, gait training, home exercise program, patient/family education, transfer training, stair training, strengthening  Plan of Care Reviewed With: patient, spouse  Progress: improving  Outcome Summary: PT pm tx completed. Patient able to perform LE ther ex as indicated on care map. Able to ambulate 212' with RW and SBA. Educate and demonstrate stairs wit patient in the am. Cont to goals.     Time Calculation:   PT Charges     Row Name 04/14/21 1406 04/14/21 1146          Time Calculation    Start Time  1255  -LM  0937  -LM     PT Received On  04/14/21  -LM  04/14/21  -LM        Time Calculation- PT    Total Timed Code Minutes- PT  26 minute(s)  -LM  25 minute(s)  -LM       User Key  (r) = Recorded By, (t) = Taken By, (c) = Cosigned By    Initials Name Provider Type    Shira Coffey, PT Physical Therapist        Therapy Charges for Today     Code Description Service Date Service Provider Modifiers Qty    39067122903 HC PT EVAL MOD COMPLEXITY 3 4/13/2021 Shira Jackson, PT GP 1    68502092874 HC GAIT TRAINING EA 15 MIN 4/14/2021 Shira Jackson, PT GP 1    92080100639 HC PT THER PROC EA 15 MIN 4/14/2021 Shira Jackson, PT GP 1    91410261278 HC GAIT TRAINING EA 15 MIN 4/14/2021 Shira Jackson, PT GP 1    28962799168 HC PT THER PROC EA 15 MIN 4/14/2021 Shira Jackson, PT GP 1          PT G-Codes  Outcome Measure Options: AM-PAC 6 Clicks Daily Activity (OT)  AM-PAC 6 Clicks Score (PT): 22  AM-PAC 6 Clicks Score (OT): 19    Shira Jackson PT  4/14/2021

## 2021-04-14 NOTE — PROGRESS NOTES
Adult Nutrition  Assessment/PES    Patient Name:  John Leyva  YOB: 1945  MRN: [id number]  Admit Date:  4/13/2021    Assessment Date:  4/14/2021    Comments:    Recommend:  1. Consider adding cardiac and consistent carbohydrate modifications to current diet order as medically appropriate and tolerated.  2. Encourage PO intake. Average intake ~75% x 2 meals.   3. Continue Prostat BID.  4. Continue with a multivitamin with minerals daily.  5. Continue to monitor and replace electrolytes PRN.     RD to follow pt and available PRN.          Reason for Assessment     Row Name 04/14/21 5597          Reason for Assessment    Reason For Assessment  follow-up protocol     Diagnosis  diabetes diagnosis/complications;other (see comments);cardiac disease DM 2, Osteoarthritis, Hip replacement     Identified At Risk by Screening Criteria  BMI             Labs/Tests/Procedures/Meds     Row Name 04/14/21 9098          Labs/Procedures/Meds    Lab Results Reviewed  reviewed, pertinent     Lab Results Comments  Low: Na; High: HgbA1c, Gluc, TG        Medications    Pertinent Medications Reviewed  reviewed, pertinent     Pertinent Medications Comments  Lipitor, Vitamin D3, multivitamin w/ minerals, Prostat         Physical Findings     Row Name 04/14/21 1330          Physical Findings    Overall Physical Appearance  obese     Skin  surgical incision R anterior hip           Nutrition Prescription Ordered     Row Name 04/14/21 133          Nutrition Prescription PO    Current PO Diet  Regular     Supplement  ProStat     Supplement Frequency  2 times a day         Evaluation of Received Nutrient/Fluid Intake     Row Name 04/14/21 3258          PO Evaluation    Number of Days PO Intake Evaluated  1 day     Number of Meals  2     % PO Intake  75               Problem/Interventions:  Problem 1     Row Name 04/14/21 4924          Nutrition Diagnoses Problem 1    Problem 1  Altered Nutrition Related to Labs     Etiology  (related to)  Medical Diagnosis     Endocrine  DM2     Signs/Symptoms (evidenced by)  Biochemical     Specific Labs Noted  Glucose;HgbA1C         Problem 2     Row Name 04/14/21 1337          Nutrition Diagnoses Problem 2    Problem 2  Overweight/Obesity     Etiology (related to)  Factors Affecting Nutrition     Food Habit/Preferences  Large Meals     Signs/Symptoms (evidenced by)  BMI     BMI  35 - 39.9         Problem 3     Row Name 04/14/21 1337          Nutrition Diagnoses Problem 3    Problem 3  Increased Nutrient Needs     Macronutrient  Kcal;Fluid;Protein     Etiology (related to)  Medical Diagnosis     Musculoskeletal  Other (comment) Surgical hip replacement     Signs/Symptoms (evidenced by)  Other (comment) Surgery noted           Intervention Goal     Row Name 04/14/21 8377          Intervention Goal    General  Meet nutritional needs for age/condition;Improved nutrition related lab(s)     PO  Meet estimated needs;Maintain intake     Weight  No significant weight loss         Nutrition Intervention     Row Name 04/14/21 5888          Nutrition Intervention    RD/Tech Action  Follow Tx progress;Encourage intake;Recommend/ordered     Recommended/Ordered  Diet         Nutrition Prescription     Row Name 04/14/21 1237          Nutrition Prescription PO    PO Prescription  Begin/change diet     Begin/Change Diet to  Regular     Common Modifiers  Cardiac;Consistent Carbohydrate     New PO Prescription Ordered?  No, recommended        Other Orders    Obtain Weight  Daily     Obtain Weight Ordered?  No, recommended     Supplement  Vitamin mineral supplement Continue     Supplement Ordered?  No, recommended     Other  Continue to monitor and replace electrolytes PRN         Education/Evaluation     Row Name 04/14/21 7233          Education    Education  Will Instruct as appropriate        Monitor/Evaluation    Monitor  Per protocol;I&O;PO intake;Supplement intake;Pertinent labs;Weight;Skin status            Electronically signed by:  Kami Elizabeth RD  04/14/21 13:38 EDT

## 2021-04-14 NOTE — THERAPY TREATMENT NOTE
Patient Name: John Leyva  : 1945    MRN: [id number]                              Today's Date: 2021       Admit Date: 2021    Visit Dx:     ICD-10-CM ICD-9-CM   1. Primary osteoarthritis of right hip  M16.11 715.15   2. Arthralgia of right hip  M25.551 719.45     Patient Active Problem List   Diagnosis   • Benign non-nodular prostatic hyperplasia   • Vitamin D deficiency   • Type 2 diabetes mellitus without complication (CMS/HCC)   • Primary gout   • Hypothyroidism   • Hyperlipidemia   • Essential hypertension   • Spinal stenosis of lumbar region with neurogenic claudication   • Primary osteoarthritis of right hip   • Arthralgia of right hip   • Ventral hernia without obstruction or gangrene   • Status post total hip replacement, right     Past Medical History:   Diagnosis Date   • Arthritis    • BPH (benign prostatic hyperplasia)    • Cataract, bilateral     s/p surgery   • Diabetes mellitus (CMS/HCC)     no medications    • ED (erectile dysfunction)    • Elevated cholesterol    • Elevated PSA    • Gout    • Hypertension    • Hypogonadism in male    • Hypothyroidism     no medications   • Impaired functional mobility, balance, gait, and endurance    • Low kidney function    • Wears glasses      Past Surgical History:   Procedure Laterality Date   • APPENDECTOMY     • COLONOSCOPY     • ELBOW OLECRANNON BURSA EXCISION Right    • EYE SURGERY Bilateral     cataracts removed   • LUMBAR LAMINECTOMY DISCECTOMY DECOMPRESSION Right 2020    Procedure: LUMBAR LAMINECTOMY L4-5, HEMILAMINECTOMY L3-4 RIGHT;  Surgeon: Paulie Daniels MD;  Location: [facility];  Service: Neurosurgery;  Laterality: Right;   • RETINAL DETACHMENT SURGERY Right     x3 surgeries   • SKIN BIOPSY      benign   • TOTAL HIP ARTHROPLASTY Right 2021    Procedure: Elective total hip arthroplasty;  Surgeon: Jose Carlson MD;  Location: [facility];  Service: Orthopedics;  Laterality: Right;     General Information     Row Name  04/14/21 0937          Physical Therapy Time and Intention    Document Type  therapy note (daily note)  -LM     Mode of Treatment  physical therapy  -LM     Row Name 04/14/21 0937          General Information    Patient Profile Reviewed  yes  -LM     Existing Precautions/Restrictions  right;hip, posterior;fall  -LM     Barriers to Rehab  none identified  -LM     Row Name 04/14/21 0937          Cognition    Orientation Status (Cognition)  oriented x 4  -LM     Row Name 04/14/21 0937          Safety Issues, Functional Mobility    Safety Issues Affecting Function (Mobility)  safety precaution awareness;safety precautions follow-through/compliance  -LM     Impairments Affecting Function (Mobility)  endurance/activity tolerance;strength;pain  -LM       User Key  (r) = Recorded By, (t) = Taken By, (c) = Cosigned By    Initials Name Provider Type    LM Shira Jackson, LIZBET Physical Therapist        Mobility     Row Name 04/14/21 0937          Bed Mobility    Bed Mobility  supine-sit  -LM     Supine-Sit [place] (Bed Mobility)  modified independence  -LM     Assistive Device (Bed Mobility)  bed rails;head of bed elevated  -LM     Row Name 04/14/21 0937          Transfers    Comment (Transfers)  Toilet transfer with grab bar, RW and SBA  -LM     Row Name 04/14/21 0937          Sit-Stand Transfer    Sit-Stand [place] (Transfers)  standby assist  -LM     Assistive Device (Sit-Stand Transfers)  walker, front-wheeled  -LM     Row Name 04/14/21 0937          Gait/Stairs (Locomotion)    [place] Level (Gait)  contact guard;verbal cues  -LM     Assistive Device (Gait)  walker, front-wheeled  -LM     Distance in Feet (Gait)  156'; v/c for correct sequencing and proper positioning of RW.  -LM     Deviations/Abnormal Patterns (Gait)  scarlet decreased;gait speed decreased;stride length decreased  -LM     Bilateral Gait Deviations  weight shift ability decreased  -LM     Row Name 04/14/21 0937          Mobility    Extremity  Weight-bearing Status  right lower extremity  -LM     Right Lower Extremity (Weight-bearing Status)  weight-bearing as tolerated (WBAT)  -       User Key  (r) = Recorded By, (t) = Taken By, (c) = Cosigned By    Initials Name Provider Type    Shira Coffey, LIZBET Physical Therapist        Obj/Interventions     Row Name 04/14/21 0937          Motor Skills    Therapeutic Exercise  other (see comments) AP, heel slides, QS, hip ABD/ADD  -LM     Row Name 04/14/21 09          Balance    Static Sitting Balance  WFL;unsupported;sitting, edge of bed  -LM     Dynamic Sitting Balance  WFL;unsupported;sitting, edge of bed  -LM     Static Standing Balance  WFL;supported  -LM     Dynamic Standing Balance  WFL;supported  -LM       User Key  (r) = Recorded By, (t) = Taken By, (c) = Cosigned By    Initials Name Provider Type    Shira Coffey, PT Physical Therapist        Goals/Plan    No documentation.       Clinical Impression     Row Name 04/14/21 0937          Pain    Additional Documentation  Pain Scale: Numbers Pre/Post-Treatment (Group)  -[facility] Name 04/14/21 09          Pain Scale: Numbers Pre/Post-Treatment    Pretreatment Pain Rating  0/10 - no pain  -     Posttreatment Pain Rating  0/10 - no pain  -     Row Name 04/14/21 0937          Plan of Care Review    Plan of Care Reviewed With  patient  -     Progress  improving  -     Outcome Summary  PT tx completed. Patient performs bed mobility with conditional independence and sit to stand/toilet transfer with RW and SBA. Able to ambulate 156' with RW, v/c and CGA. Performs LE ther ex as indicated on care map and able to verbalize LASHELL precautions. Cont to goals.  -     Row Name 04/14/21 0937          Therapy Assessment/Plan (PT)    Patient/Family Therapy Goals Statement (PT)  Return home.  -     Rehab Potential (PT)  good, to achieve stated therapy goals  -     Row Name 04/14/21 0937          Vital Signs    O2 Delivery Pre Treatment  room air  -      O2 Delivery Intra Treatment  room air  -LM     O2 Delivery Post Treatment  room air  -LM     Row Name 04/14/21 0937          Positioning and Restraints    Pre-Treatment Position  in bed  -LM     Post Treatment Position  chair  -LM     In Chair  notified nsg;reclined;call light within reach;encouraged to call for assist;pillow between legs  -LM       User Key  (r) = Recorded By, (t) = Taken By, (c) = Cosigned By    Initials Name Provider Type    Shira Coffey, LIZBET Physical Therapist        Outcome Measures     Row Name 04/14/21 0937          How much help from another person do you currently need...    Turning from your back to your side while in flat bed without using bedrails?  4  -LM     Moving from lying on back to sitting on the side of a flat bed without bedrails?  4  -LM     Moving to and from a bed to a chair (including a wheelchair)?  3  -LM     Standing up from a chair using your arms (e.g., wheelchair, bedside chair)?  3  -LM     Climbing 3-5 steps with a railing?  2  -LM     To walk in hospital room?  3  -LM     AM-PAC 6 Clicks Score (PT)  19  -LM     Row Name 04/14/21 0937          Functional Assessment    Outcome Measure Options  AM-PAC 6 Clicks Basic Mobility (PT)  -LM       User Key  (r) = Recorded By, (t) = Taken By, (c) = Cosigned By    Initials Name Provider Type    Shira Coffey, LIZBET Physical Therapist        Physical Therapy Education                 Title: PT OT SLP Therapies (In Progress)     Topic: Physical Therapy (Done)     Point: Mobility training (Done)     Learning Progress Summary           Patient Acceptance, E,TB, VU by  at 4/14/2021 1136    Comment: Ther ex for HEP. Proper use of RW for safe transfers/ambulation. LASHELL precautions    Acceptance, E,TB, VU by  at 4/13/2021 1450    Comment: Purpose of PT/POC; LASHELL precautions; proper use of RW for safe transfers/ambulation; ther ex for HEP.   Family Acceptance, E,TB, VU by  at 4/13/2021 1450    Comment: Purpose of PT/POC; LASHELL  precautions; proper use of RW for safe transfers/ambulation; ther ex for HEP.                   Point: Home exercise program (Done)     Learning Progress Summary           Patient Acceptance, E,TB, VU by LM at 4/14/2021 1136    Comment: Ther ex for HEP. Proper use of RW for safe transfers/ambulation. LASHELL precautions    Acceptance, E,TB, VU by LM at 4/13/2021 1450    Comment: Purpose of PT/POC; LASHELL precautions; proper use of RW for safe transfers/ambulation; ther ex for HEP.   Family Acceptance, E,TB, VU by LM at 4/13/2021 1450    Comment: Purpose of PT/POC; LASHELL precautions; proper use of RW for safe transfers/ambulation; ther ex for HEP.                   Point: Precautions (Done)     Learning Progress Summary           Patient Acceptance, E,TB, VU by LM at 4/14/2021 1136    Comment: Ther ex for HEP. Proper use of RW for safe transfers/ambulation. LASHELL precautions    Acceptance, E,TB, VU by LM at 4/13/2021 1450    Comment: Purpose of PT/POC; LASHELL precautions; proper use of RW for safe transfers/ambulation; ther ex for HEP.   Family Acceptance, E,TB, VU by LM at 4/13/2021 1450    Comment: Purpose of PT/POC; LASHELL precautions; proper use of RW for safe transfers/ambulation; ther ex for HEP.                               User Key     Initials Effective Dates Name Provider Type Discipline     04/03/18 -  Shira Jackson, PT Physical Therapist PT              PT Recommendation and Plan  Planned Therapy Interventions (PT): balance training, bed mobility training, gait training, home exercise program, patient/family education, transfer training, stair training, strengthening  Plan of Care Reviewed With: patient  Progress: improving  Outcome Summary: PT tx completed. Patient performs bed mobility with conditional independence and sit to stand/toilet transfer with RW and SBA. Able to ambulate 156' with RW, v/c and CGA. Performs LE ther ex as indicated on care map and able to verbalize LASHELL precautions. Cont to goals.     Time  Calculation:   PT Charges     Row Name 04/14/21 1146             Time Calculation    Start Time  0937  -LM      PT Received On  04/14/21  -LM         Time Calculation- PT    Total Timed Code Minutes- PT  25 minute(s)  -LM        User Key  (r) = Recorded By, (t) = Taken By, (c) = Cosigned By    Initials Name Provider Type    LM Shira Jackson, PT Physical Therapist        Therapy Charges for Today     Code Description Service Date Service Provider Modifiers Qty    72843473561 HC PT EVAL MOD COMPLEXITY 3 4/13/2021 Shira Jackson, PT GP 1    03452787925 HC GAIT TRAINING EA 15 MIN 4/14/2021 Shira Jackson, PT GP 1    51105149263 HC PT THER PROC EA 15 MIN 4/14/2021 Shira Jackson, PT GP 1          PT G-Codes  Outcome Measure Options: AM-PAC 6 Clicks Basic Mobility (PT)  AM-PAC 6 Clicks Score (PT): 19  AM-PAC 6 Clicks Score (OT): 16    Shira Jackson PT  4/14/2021

## 2021-04-14 NOTE — THERAPY TREATMENT NOTE
Patient Name: John Leyva  : 1945    MRN: [id number]                              Today's Date: 2021       Admit Date: 2021    Visit Dx:     ICD-10-CM ICD-9-CM   1. Status post total hip replacement, right  Z96.641 V43.64   2. Primary osteoarthritis of right hip  M16.11 715.15   3. Arthralgia of right hip  M25.551 719.45     Patient Active Problem List   Diagnosis   • Benign non-nodular prostatic hyperplasia   • Vitamin D deficiency   • Type 2 diabetes mellitus without complication (CMS/HCC)   • Primary gout   • Hypothyroidism   • Hyperlipidemia   • Essential hypertension   • Spinal stenosis of lumbar region with neurogenic claudication   • Primary osteoarthritis of right hip   • Arthralgia of right hip   • Ventral hernia without obstruction or gangrene   • Status post total hip replacement, right     Past Medical History:   Diagnosis Date   • Arthritis    • BPH (benign prostatic hyperplasia)    • Cataract, bilateral     s/p surgery   • Diabetes mellitus (CMS/HCC)     no medications    • ED (erectile dysfunction)    • Elevated cholesterol    • Elevated PSA    • Gout    • Hypertension    • Hypogonadism in male    • Hypothyroidism     no medications   • Impaired functional mobility, balance, gait, and endurance    • Low kidney function    • Wears glasses      Past Surgical History:   Procedure Laterality Date   • APPENDECTOMY     • COLONOSCOPY     • ELBOW OLECRANNON BURSA EXCISION Right    • EYE SURGERY Bilateral     cataracts removed   • LUMBAR LAMINECTOMY DISCECTOMY DECOMPRESSION Right 2020    Procedure: LUMBAR LAMINECTOMY L4-5, HEMILAMINECTOMY L3-4 RIGHT;  Surgeon: Paulie Daniels MD;  Location: [facility];  Service: Neurosurgery;  Laterality: Right;   • RETINAL DETACHMENT SURGERY Right     x3 surgeries   • SKIN BIOPSY      benign   • TOTAL HIP ARTHROPLASTY Right 2021    Procedure: Elective total hip arthroplasty;  Surgeon: Jose Carlson MD;  Location: [facility];  Service:  Orthopedics;  Laterality: Right;     General Information     Row Name 04/14/21 1252          OT Time and Intention    Document Type  therapy note (daily note)  -SD     Mode of Treatment  occupational therapy  -SD       User Key  (r) = Recorded By, (t) = Taken By, (c) = Cosigned By    Initials Name Provider Type    Lucrecia Quinteros OT Occupational Therapist          Mobility/ADL's     Row Name 04/14/21 1252          Bed Mobility    Comment (Bed Mobility)  in chair  -SD     Row Name 04/14/21 1252          Transfers    Transfers  sit-stand transfer  -SD     Sit-Stand [place] (Transfers)  standby assist  -SD     Row Name 04/14/21 1252          Sit-Stand Transfer    Assistive Device (Sit-Stand Transfers)  walker, front-wheeled  -SD     Row Name 04/14/21 1252          Functional Mobility    Functional Mobility- Ind. Level  contact guard assist;standby assist  -SD     Functional Mobility- Device  rolling walker  -SD     Functional Mobility-Distance (Feet)  23 x2  -SD     Row Name 04/14/21 1252          Bathing Assessment/Intervention    [place] Level (Bathing)  upper extremities;perineal area;contact guard assist;chest/trunk  -SD     Position (Bathing)  sink side;unsupported standing  -SD     Row Name 04/14/21 1252          Lower Body Dressing Assessment/Training    [place] Level (Lower Body Dressing)  don;doff;pants/bottoms;socks;standby assist;verbal cues  -SD     Comment (Lower Body Dressing)  using AE  -SD     Row Name 04/14/21 1252          Grooming Assessment/Training    [place] Level (Grooming)  hair care, combing/brushing;oral care regimen;wash face, hands;standby assist  -SD     Position (Grooming)  sink side;unsupported standing  -SD       User Key  (r) = Recorded By, (t) = Taken By, (c) = Cosigned By    Initials Name Provider Type    Lucrecia Quinteros OT Occupational Therapist        Obj/Interventions     Row Name 04/14/21 1255          Therapeutic Exercise    Therapeutic Exercise   shoulder;elbow/forearm B UE HEP using green theraband x 15  -SD       User Key  (r) = Recorded By, (t) = Taken By, (c) = Cosigned By    Initials Name Provider Type    Lucrecia Quinteros OT Occupational Therapist        Goals/Plan    No documentation.       Clinical Impression     Row Name 04/14/21 1256          Pain Scale: Numbers Pre/Post-Treatment    Pretreatment Pain Rating  1/10  -SD     Posttreatment Pain Rating  1/10  -SD     Pain Location - Side  Right  -SD     Pain Location - Orientation  lower  -SD     Pain Location  extremity  -SD     Row Name 04/14/21 1256          Plan of Care Review    Plan of Care Reviewed With  patient;spouse  -SD     Progress  improving  -SD     Outcome Summary  OT tx completed. Patient completed transfer and mobility to and from bathroom with SBA-CGA, was able to complete UB and perineal bathing standing at sink with CGA, LBD using AE with SBA, grooming tasks in unsupported standing with SBA. Patient education/application UB HEP using theraband. Continue OT POC  -SD     Row Name 04/14/21 1256          Positioning and Restraints    Pre-Treatment Position  sitting in chair/recliner  -SD     Post Treatment Position  chair  -SD     In Chair  reclined;call light within reach;encouraged to call for assist;with family/caregiver  -SD       User Key  (r) = Recorded By, (t) = Taken By, (c) = Cosigned By    Initials Name Provider Type    Lucrecia Quinteros OT Occupational Therapist        Outcome Measures     Row Name 04/14/21 1258          How much help from another is currently needed...    Putting on and taking off regular lower body clothing?  3  -SD     Bathing (including washing, rinsing, and drying)  3  -SD     Toileting (which includes using toilet bed pan or urinal)  3  -SD     Putting on and taking off regular upper body clothing  3  -SD     Taking care of personal grooming (such as brushing teeth)  3  -SD     Eating meals  4  -SD     AM-PAC 6 Clicks Score (OT)  19  -SD     Row  Name 04/14/21 [employer]          Functional Assessment    Outcome Measure Options  AM-PAC 6 Clicks Daily Activity (OT)  -SD       User Key  (r) = Recorded By, (t) = Taken By, (c) = Cosigned By    Initials Name Provider Type    SD Lucrecia Muro OT Occupational Therapist        Occupational Therapy Education                 Title: PT OT SLP Therapies (In Progress)     Topic: Occupational Therapy (In Progress)     Point: ADL training (Done)     Description:   Instruct learner(s) on proper safety adaptation and remediation techniques during self care or transfers.   Instruct in proper use of assistive devices.              Learning Progress Summary           Patient Acceptance, E,TB, VU by SD at 4/14/2021 1259    Comment: AE for LB self care    Acceptance, E,TB, VU by SD at 4/13/2021 1535    Comment: Benefit of OT; OT POC   Significant Other Acceptance, E,TB, VU by SD at 4/14/2021 1259    Comment: AE for LB self care    Acceptance, E,TB, VU by SD at 4/13/2021 1535    Comment: Benefit of OT; OT POC                   Point: Home exercise program (Not Started)     Description:   Instruct learner(s) on appropriate technique for monitoring, assisting and/or progressing therapeutic exercises/activities.              Learner Progress:  Not documented in this visit.          Point: Precautions (Not Started)     Description:   Instruct learner(s) on prescribed precautions during self-care and functional transfers.              Learner Progress:  Not documented in this visit.          Point: Body mechanics (Not Started)     Description:   Instruct learner(s) on proper positioning and spine alignment during self-care, functional mobility activities and/or exercises.              Learner Progress:  Not documented in this visit.                      User Key     Initials Effective Dates Name Provider Type Discipline    SD 03/07/18 -  Lucrecia Muro OT Occupational Therapist OT              OT Recommendation and Plan  Planned  Therapy Interventions (OT): activity tolerance training, adaptive equipment training, BADL retraining, patient/caregiver education/training, ROM/therapeutic exercise, strengthening exercise, transfer/mobility retraining  Therapy Frequency (OT): daily (Mon-Fri)  Plan of Care Review  Plan of Care Reviewed With: patient, spouse  Progress: improving  Outcome Summary: OT tx completed. Patient completed transfer and mobility to and from bathroom with SBA-CGA, was able to complete UB and perineal bathing standing at sink with CGA, LBD using AE with SBA, grooming tasks in unsupported standing with SBA. Patient education/application UB HEP using theraband. Continue OT POC     Time Calculation:   Time Calculation- OT     Row Name 04/14/21 1300             Time Calculation- OT    OT Start Time  1116  -SD      OT Stop Time  1155  -SD      OT Time Calculation (min)  39 min  -SD      OT Received On  04/14/21  -SD      OT Goal Re-Cert Due Date  04/23/21  -SD         Timed Charges    89778 - OT Therapeutic Exercise Minutes  8  -SD      16006 - OT Therapeutic Activity Minutes  11  -SD      75645 - OT Self Care/Mgmt Minutes  20  -SD        User Key  (r) = Recorded By, (t) = Taken By, (c) = Cosigned By    Initials Name Provider Type    SD Lucrecia Muro OT Occupational Therapist        Therapy Charges for Today     Code Description Service Date Service Provider Modifiers Qty    84828931188 HC OT EVAL LOW COMPLEXITY 3 4/13/2021 Lucrecia Muro OT GO 1    16502875355 HC OT THER PROC EA 15 MIN 4/14/2021 Lucrecia Muro OT GO 1    39134574719 HC OT THERAPEUTIC ACT EA 15 MIN 4/14/2021 Lucrecia Muro OT GO 1    06947622213 HC OT SELF CARE/MGMT/TRAIN EA 15 MIN 4/14/2021 Lucrecia Muro OT GO 1               Lucrecia Muro OT  4/14/2021

## 2021-04-14 NOTE — PLAN OF CARE
Goal Outcome Evaluation:  Plan of Care Reviewed With: patient, spouse  Progress: improving  Outcome Summary: OT tx completed. Patient completed transfer and mobility to and from bathroom with SBA-CGA, was able to complete UB and perineal bathing standing at sink with CGA, LBD using AE with SBA, grooming tasks in unsupported standing with SBA. Patient education/application UB HEP using theraband. Continue OT POC

## 2021-04-14 NOTE — PROGRESS NOTES
[facility]  PROGRESS NOTE    Name:  John Leyva   Age:  75 y.o.  Sex:  male  :  1945  MRN:  [id number]   Visit Number:  [id number]  Admission Date:  2021  Date Of Service:  21  Primary Care Physician:  Mahesh Almanza MD     LOS: 1 day :  Patient Care Team:  Mahesh Almanza MD as PCP - General (Internal Medicine):    Chief Complaint:      POD # 1 elective right LASHELL    Subjective / Interval History:     Patient is sitting upright in the recliner in no apparent distress.  He states his pain is well controlled.  He denies chest pain or shortness of breath.  He would like to go home when medically possible.    Review of Systems:     General ROS: No fever spike, no acute cognitive change.  Ophthalmic ROS: no acute visual disturbance.  ENT ROS: No acute sinus congestion.   Respiratory ROS: No new shortness of breath.   Cardiovascular ROS: No new chest pain.  Gastrointestinal ROS: No acute abdominal change. Non-distended.  Genito-Urinary ROS: No reported dysuria.  Musculoskeletal ROS: No deep calf pain. No acute focal motor deficit  Neurological ROS: No new numbness or tingling.  Dermatological ROS: No erythema.  No cyanosis.  No rash or pruritis.    Vital Signs:    Temp:  [97.3 °F (36.3 °C)-98.4 °F (36.9 °C)] 98.4 °F (36.9 °C)  Heart Rate:  [72-90] 83  Resp:  [17-20] 18  BP: (114-147)/(58-89) 114/66    Intake and output:    I/O last 3 completed shifts:  In: 2260 [P.O.:360; I.V.:1900]  Out: 2750 [Urine:2550; Blood:200]  I/O this shift:  In: 360 [P.O.:360]  Out: -     Physical Examination:    General Appearance:    Alert, cooperative, and no acute distress.   Head:    Atraumatic and normocephalic, without obvious abnormality.   Eyes:    Extraocular movements are within normal limits.   ENT:   No acute change.   Neck:   Supple, trachea midline.   Lungs:     Normal respirations, unlabored.    Heart:    Regular rate.   Abdomen:     Soft, nondistended.   Extremities:   No new motor deficit,  no calf pain, Homans sign negative.   Skin:     No rash.  No cyanosis.  No erythema.  No active drainage.       Neurologic:   Sensation intact, pulses intact.     Laboratory results:    Lab Results (last 24 hours)     Procedure Component Value Units Date/Time    Basic Metabolic Panel [[id number]]  (Abnormal) Collected: 04/14/21 0637    Specimen: Blood Updated: 04/14/21 0709     Glucose 158 mg/dL      BUN 19 mg/dL      Creatinine 1.01 mg/dL      Sodium 135 mmol/L      Potassium 4.2 mmol/L      Chloride 101 mmol/L      CO2 25.1 mmol/L      Calcium 8.8 mg/dL      eGFR Non African Amer 72 mL/min/1.73      BUN/Creatinine Ratio 18.8     Anion Gap 8.9 mmol/L     Narrative:      GFR Normal >60  Chronic Kidney Disease <60  Kidney Failure <15      CBC & Differential [[id number]]  (Abnormal) Collected: 04/14/21 0637    Specimen: Blood Updated: 04/14/21 0648    Narrative:      The following orders were created for panel order CBC & Differential.  Procedure                               Abnormality         Status                     ---------                               -----------         ------                     CBC Auto Differential[[id number]]        Abnormal            Final result                 Please view results for these tests on the individual orders.    CBC Auto Differential [[id number]]  (Abnormal) Collected: 04/14/21 0637    Specimen: Blood Updated: 04/14/21 0648     WBC 16.19 10*3/mm3      RBC 4.54 10*6/mm3      Hemoglobin 13.9 g/dL      Hematocrit 40.6 %      MCV 89.4 fL      MCH 30.6 pg      MCHC 34.2 g/dL      RDW 13.2 %      RDW-SD 43.2 fl      MPV 9.9 fL      Platelets 239 10*3/mm3      Neutrophil % 83.0 %      Lymphocyte % 11.5 %      Monocyte % 4.9 %      Eosinophil % 0.0 %      Basophil % 0.1 %      Immature Grans % 0.5 %      Neutrophils, Absolute 13.43 10*3/mm3      Lymphocytes, Absolute 1.86 10*3/mm3      Monocytes, Absolute 0.80 10*3/mm3      Eosinophils, Absolute 0.00 10*3/mm3      Basophils,  Absolute 0.02 10*3/mm3      Immature Grans, Absolute 0.08 10*3/mm3      nRBC 0.0 /100 WBC           I have reviewed the patient's laboratory results.    Radiology results:    Imaging Results (Last 24 Hours)     ** No results found for the last 24 hours. **          I have reviewed the patient's radiology reports.    AP and lateral taken perioperative shows no acute concern.  Good position and alignment of implants and/or fracture.       Assessment/Plan      Status post total hip replacement, right    Primary osteoarthritis of right hip    Arthralgia of right hip      S/P Right LASHELL:    Continue current management per the detailed orders and instructions, including skin, safety and fall precautions, respiratory therapy with incentive spirometer, advance diet as tolerated, bowel regimen, multi-modal analgesia, DVT prophylaxis, Hospitalist medical management, PT/OT following post-surgical rehab protocol, and Case Management for discharge and rehabilitation plans.  Patient does have an elevated white blood cell count however, he denies any source of possible infection and this is thought to be related from postoperative state as well as steroids administered from anesthesia    Plan for discharge tomorrow with HH.  Kike Luu PA-C  04/14/21  12:01 EDT

## 2021-04-14 NOTE — PROGRESS NOTES
No anesthesia related problems   Ha    Nerve Cath Post Op Call    Patient Name: John Leyva  :  1945  MRN:  [id number]  Date of Discharge:     POST OP CALL

## 2021-04-15 ENCOUNTER — READMISSION MANAGEMENT (OUTPATIENT)
Dept: CALL CENTER | Facility: HOSPITAL | Age: 76
End: 2021-04-15

## 2021-04-15 VITALS
HEIGHT: 68 IN | DIASTOLIC BLOOD PRESSURE: 69 MMHG | OXYGEN SATURATION: 92 % | WEIGHT: 255.07 LBS | RESPIRATION RATE: 16 BRPM | HEART RATE: 70 BPM | TEMPERATURE: 98.6 F | BODY MASS INDEX: 38.66 KG/M2 | SYSTOLIC BLOOD PRESSURE: 128 MMHG

## 2021-04-15 PROCEDURE — 63710000001 AMLODIPINE 5 MG TABLET: Performed by: ORTHOPAEDIC SURGERY

## 2021-04-15 PROCEDURE — 97116 GAIT TRAINING THERAPY: CPT

## 2021-04-15 PROCEDURE — 63710000001 FINASTERIDE 5 MG TABLET: Performed by: ORTHOPAEDIC SURGERY

## 2021-04-15 PROCEDURE — 63710000001 DOCUSATE SODIUM 100 MG CAPSULE: Performed by: ORTHOPAEDIC SURGERY

## 2021-04-15 PROCEDURE — A9270 NON-COVERED ITEM OR SERVICE: HCPCS | Performed by: ORTHOPAEDIC SURGERY

## 2021-04-15 PROCEDURE — 63710000001 DULOXETINE 30 MG CAPSULE DELAYED-RELEASE PARTICLES: Performed by: ORTHOPAEDIC SURGERY

## 2021-04-15 PROCEDURE — G0378 HOSPITAL OBSERVATION PER HR: HCPCS

## 2021-04-15 PROCEDURE — 97110 THERAPEUTIC EXERCISES: CPT

## 2021-04-15 PROCEDURE — 63710000001 LISINOPRIL 10 MG TABLET: Performed by: ORTHOPAEDIC SURGERY

## 2021-04-15 PROCEDURE — 63710000001 METOPROLOL SUCCINATE XL 100 MG TABLET SUSTAINED-RELEASE 24 HOUR: Performed by: ORTHOPAEDIC SURGERY

## 2021-04-15 PROCEDURE — 25010000002 ENOXAPARIN PER 10 MG: Performed by: ORTHOPAEDIC SURGERY

## 2021-04-15 PROCEDURE — 63710000001 MULTIVITAMIN WITH MINERALS TABLET: Performed by: ORTHOPAEDIC SURGERY

## 2021-04-15 PROCEDURE — 97535 SELF CARE MNGMENT TRAINING: CPT

## 2021-04-15 PROCEDURE — 63710000001 HYDROCODONE-ACETAMINOPHEN 10-325 MG TABLET: Performed by: ORTHOPAEDIC SURGERY

## 2021-04-15 PROCEDURE — 63710000001 ALLOPURINOL 100 MG TABLET: Performed by: ORTHOPAEDIC SURGERY

## 2021-04-15 PROCEDURE — 63710000001 CHOLECALCIFEROL 25 MCG (1000 UT) TABLET: Performed by: ORTHOPAEDIC SURGERY

## 2021-04-15 PROCEDURE — 63710000001 HYDROCHLOROTHIAZIDE 12.5 MG CAPSULE: Performed by: ORTHOPAEDIC SURGERY

## 2021-04-15 PROCEDURE — 99024 POSTOP FOLLOW-UP VISIT: CPT | Performed by: PHYSICIAN ASSISTANT

## 2021-04-15 PROCEDURE — 63710000001 PRO-STAT LIQUID: Performed by: ORTHOPAEDIC SURGERY

## 2021-04-15 RX ORDER — PSEUDOEPHEDRINE HCL 30 MG
100 TABLET ORAL 2 TIMES DAILY
Qty: 14 CAPSULE | Refills: 0 | Status: SHIPPED | OUTPATIENT
Start: 2021-04-15 | End: 2021-07-12

## 2021-04-15 RX ADMIN — FINASTERIDE 5 MG: 5 TABLET, FILM COATED ORAL at 09:36

## 2021-04-15 RX ADMIN — ENOXAPARIN SODIUM 40 MG: 40 INJECTION SUBCUTANEOUS at 09:35

## 2021-04-15 RX ADMIN — ALLOPURINOL 100 MG: 100 TABLET ORAL at 09:34

## 2021-04-15 RX ADMIN — DOCUSATE SODIUM 100 MG: 100 CAPSULE, LIQUID FILLED ORAL at 09:34

## 2021-04-15 RX ADMIN — SODIUM CHLORIDE, PRESERVATIVE FREE 3 ML: 5 INJECTION INTRAVENOUS at 09:35

## 2021-04-15 RX ADMIN — Medication 30 ML: at 09:38

## 2021-04-15 RX ADMIN — LISINOPRIL 10 MG: 10 TABLET ORAL at 09:34

## 2021-04-15 RX ADMIN — AMLODIPINE BESYLATE 5 MG: 5 TABLET ORAL at 09:34

## 2021-04-15 RX ADMIN — Medication 500 UNITS: at 09:34

## 2021-04-15 RX ADMIN — HYDROCODONE BITARTRATE AND ACETAMINOPHEN 1 TABLET: 10; 325 TABLET ORAL at 09:32

## 2021-04-15 RX ADMIN — Medication 1 TABLET: at 09:33

## 2021-04-15 RX ADMIN — HYDROCHLOROTHIAZIDE 12.5 MG: 12.5 CAPSULE ORAL at 09:33

## 2021-04-15 RX ADMIN — DULOXETINE HYDROCHLORIDE 30 MG: 30 CAPSULE, DELAYED RELEASE ORAL at 09:34

## 2021-04-15 RX ADMIN — METOPROLOL SUCCINATE 100 MG: 100 TABLET, EXTENDED RELEASE ORAL at 09:34

## 2021-04-15 RX ADMIN — SODIUM CHLORIDE, POTASSIUM CHLORIDE, SODIUM LACTATE AND CALCIUM CHLORIDE 100 ML/HR: 600; 310; 30; 20 INJECTION, SOLUTION INTRAVENOUS at 01:59

## 2021-04-15 NOTE — OUTREACH NOTE
Prep Survey      Responses   [facility] patient discharged from?  [place]   Is LACE score < 7 ?  Yes   Emergency Room discharge w/ pulse ox?  No   Eligibility  [facility]   Date of Admission  04/13/21   Date of Discharge  04/15/21   [place] [facility] [place] diagnosis  Elective total hip arthroplasty   Does the patient have one of the following disease processes/diagnoses(primary or secondary)?  Total Joint Replacement   Does the patient have Home health ordered?  Yes   What is the Home health agency?   [facility]   Is there a DME ordered?  Yes   What DME was ordered?  Naye/Celine, Hosp bed, walker, BSC   Prep survey completed?  Yes          Susanna Chen RN

## 2021-04-15 NOTE — PROGRESS NOTES
Discharge Planning Assessment  [facility]     Patient Name: John Leyva  MRN: [id number]  Today's Date: 4/15/2021    Admit Date: 4/13/2021    Discharge Needs Assessment    No documentation.       Discharge Plan     Row Name 04/15/21 0904       Plan    Plan Comments  Called [facility] will accept patientMeaghan from [facility]' states it ford be about an hour and she will deliver the walker and bedside commode        Continued Care and Services - Admitted Since 4/13/2021     Durable Medical Equipment Coordination complete    Service Provider Request Status Selected Services Address Phone Fax Patient Preferred    [facility]   [facility] Durable Medical Equipment [address] [address] [phone] --    [facility]   Selected Durable Medical Equipment [address] [facility] [address] [facility] [address] [id number] [phone] --          Home Medical Care Coordination complete    Service Provider Request Status Selected Services Address Phone Fax Patient Preferred    [facility]   Selected Home Health Services [address] [facility] [phone] --              Expected Discharge Date and Time     Expected Discharge Date Expected Discharge Time    Apr 15, 2021         Demographic Summary    No documentation.       Functional Status    No documentation.       Psychosocial    No documentation.       Abuse/Neglect    No documentation.       Legal    No documentation.       Substance Abuse    No documentation.       Patient Forms    No documentation.           Roseline Valdez RN

## 2021-04-15 NOTE — PLAN OF CARE
Goal Outcome Evaluation:  Plan of Care Reviewed With: patient     Outcome Summary: Patient did well through the night. Melatonin was ordered to help him sleep. He did not want any pain medication at this point. Ambulated around the room and did well . Possible discharge home on Thursday. Vital signs have been stable and will continue to monitor. no overnight events to report.

## 2021-04-15 NOTE — THERAPY TREATMENT NOTE
Patient Name: John Leyva  : 1945    MRN: [id number]                              Today's Date: 4/15/2021       Admit Date: 2021    Visit Dx:     ICD-10-CM ICD-9-CM   1. Status post total hip replacement, right  Z96.641 V43.64   2. Primary osteoarthritis of right hip  M16.11 715.15   3. Arthralgia of right hip  M25.551 719.45     Patient Active Problem List   Diagnosis   • Benign non-nodular prostatic hyperplasia   • Vitamin D deficiency   • Type 2 diabetes mellitus without complication (CMS/HCC)   • Primary gout   • Hypothyroidism   • Hyperlipidemia   • Essential hypertension   • Spinal stenosis of lumbar region with neurogenic claudication   • Primary osteoarthritis of right hip   • Arthralgia of right hip   • Ventral hernia without obstruction or gangrene   • Status post total hip replacement, right     Past Medical History:   Diagnosis Date   • Arthritis    • BPH (benign prostatic hyperplasia)    • Cataract, bilateral     s/p surgery   • Diabetes mellitus (CMS/HCC)     no medications    • ED (erectile dysfunction)    • Elevated cholesterol    • Elevated PSA    • Gout    • Hypertension    • Hypogonadism in male    • Hypothyroidism     no medications   • Impaired functional mobility, balance, gait, and endurance    • Low kidney function    • Wears glasses      Past Surgical History:   Procedure Laterality Date   • APPENDECTOMY     • COLONOSCOPY     • ELBOW OLECRANNON BURSA EXCISION Right    • EYE SURGERY Bilateral     cataracts removed   • LUMBAR LAMINECTOMY DISCECTOMY DECOMPRESSION Right 2020    Procedure: LUMBAR LAMINECTOMY L4-5, HEMILAMINECTOMY L3-4 RIGHT;  Surgeon: Paulie Daniels MD;  Location: [facility] OR;  Service: Neurosurgery;  Laterality: Right;   • RETINAL DETACHMENT SURGERY Right     x3 surgeries   • SKIN BIOPSY      benign   • TOTAL HIP ARTHROPLASTY Right 2021    Procedure: Elective total hip arthroplasty;  Surgeon: Jose Carlson MD;  Location: [facility] OR;  Service:  Orthopedics;  Laterality: Right;     General Information     Row Name 04/15/21 0935          OT Time and Intention    Document Type  therapy note (daily note)  -SD     Mode of Treatment  occupational therapy  -SD       User Key  (r) = Recorded By, (t) = Taken By, (c) = Cosigned By    Initials Name Provider Type    Lucrecia Quinteros OT Occupational Therapist          Mobility/ADL's     Row Name 04/15/21 0935          Bed Mobility    Bed Mobility  supine-sit  -SD     Supine-Sit [place] (Bed Mobility)  modified independence  -SD     Assistive Device (Bed Mobility)  bed rails;head of bed elevated  -SD     Row Name 04/15/21 0935          Transfers    Transfers  sit-stand transfer  -SD     Sit-Stand [place] (Transfers)  standby assist  -SD     Row Name 04/15/21 0935          Sit-Stand Transfer    Assistive Device (Sit-Stand Transfers)  walker, front-wheeled  -SD     [facility] Name 04/15/21 0935          Functional Mobility    Functional Mobility- Ind. Level  standby assist;verbal cues required  -SD     Functional Mobility- Device  rolling walker  -SD     Functional Mobility-Distance (Feet)  36  -SD     [facility] Name 04/15/21 0935          Grooming Assessment/Training    [place] Level (Grooming)  wash face, hands;supervision  -SD     Position (Grooming)  sink side;unsupported standing  -SD     [facility] Name 04/15/21 0935          Toileting Assessment/Training    [place] Level (Toileting)  supervision;set up  -SD     Assistive Devices (Toileting)  urinal  -SD     Position (Toileting)  unsupported standing  -SD       User Key  (r) = Recorded By, (t) = Taken By, (c) = Cosigned By    Initials Name Provider Type    Lucrecia Quinteros OT Occupational Therapist        Obj/Interventions    No documentation.       Goals/Plan    No documentation.       Clinical Impression     Row Name 04/15/21 0937          Pain Scale: Numbers Pre/Post-Treatment    Pretreatment Pain Rating  0/10 - no pain  -SD     Posttreatment Pain Rating   0/10 - no pain  -SD     Row Name 04/15/21 0937          Plan of Care Review    Plan of Care Reviewed With  patient  -SD     Progress  improving  -SD     Outcome Summary  OT tx completed. patient completed bed mobility with conditional ind, transfer and functional mobility with SBA, toileting and grooming tasks with sup. Reviewed LASHELL precautions and safety during ADLs and functional mobility. Patient to [place] home today.  -SD     Row Name 04/15/21 0937          Positioning and Restraints    Pre-Treatment Position  in bed  -SD     Post Treatment Position  chair  -SD     In Chair  sitting;call light within reach;encouraged to call for assist;with nsg  -SD       User Key  (r) = Recorded By, (t) = Taken By, (c) = Cosigned By    Initials Name Provider Type    Lucrecia Quinteros OT Occupational Therapist        Outcome Measures     Row Name 04/15/21 0938          How much help from another is currently needed...    Putting on and taking off regular lower body clothing?  3  -SD     Bathing (including washing, rinsing, and drying)  3  -SD     Toileting (which includes using toilet bed pan or urinal)  4  -SD     Putting on and taking off regular upper body clothing  4  -SD     Taking care of personal grooming (such as brushing teeth)  4  -SD     Eating meals  4  -SD     AM-PAC 6 Clicks Score (OT)  22  -SD     Row Name 04/15/21 0938          Functional Assessment    Outcome Measure Options  AM-PAC 6 Clicks Daily Activity (OT)  -SD       User Key  (r) = Recorded By, (t) = Taken By, (c) = Cosigned By    Initials Name Provider Type    Lucrecia Quinteros OT Occupational Therapist        Occupational Therapy Education                 Title: PT OT SLP Therapies (In Progress)     Topic: Occupational Therapy (In Progress)     Point: ADL training (Done)     Description:   Instruct learner(s) on proper safety adaptation and remediation techniques during self care or transfers.   Instruct in proper use of assistive devices.               Learning Progress Summary           Patient Acceptance, E,TB, VU by SD at 4/15/2021 0939    Comment: Safety during functional mobility with regards to LASHELL precautions    Acceptance, E,TB, VU by SD at 4/14/2021 1259    Comment: AE for LB self care    Acceptance, E,TB, VU by SD at 4/13/2021 1535    Comment: Benefit of OT; OT POC   Significant Other Acceptance, E,TB, VU by SD at 4/14/2021 1259    Comment: AE for LB self care    Acceptance, E,TB, VU by SD at 4/13/2021 1535    Comment: Benefit of OT; OT POC                   Point: Home exercise program (Not Started)     Description:   Instruct learner(s) on appropriate technique for monitoring, assisting and/or progressing therapeutic exercises/activities.              Learner Progress:  Not documented in this visit.          Point: Precautions (Not Started)     Description:   Instruct learner(s) on prescribed precautions during self-care and functional transfers.              Learner Progress:  Not documented in this visit.          Point: Body mechanics (Not Started)     Description:   Instruct learner(s) on proper positioning and spine alignment during self-care, functional mobility activities and/or exercises.              Learner Progress:  Not documented in this visit.                      User Key     Initials Effective Dates Name Provider Type Discipline    SD 03/07/18 -  Lucrecia Muro OT Occupational Therapist OT              OT Recommendation and Plan  Planned Therapy Interventions (OT): activity tolerance training, adaptive equipment training, BADL retraining, patient/caregiver education/training, ROM/therapeutic exercise, strengthening exercise, transfer/mobility retraining  Therapy Frequency (OT): daily (Mon-Fri)  Plan of Care Review  Plan of Care Reviewed With: patient  Progress: improving  Outcome Summary: OT tx completed. patient completed bed mobility with conditional ind, transfer and functional mobility with SBA, toileting and grooming tasks with  sup. Reviewed LASHELL precautions and safety during ADLs and functional mobility. Patient to [place] home today.     Time Calculation:   Time Calculation- OT     Row Name 04/15/21 0939             Time Calculation- OT    OT Start Time  0909  -SD      OT Stop Time  0933  -SD      OT Time Calculation (min)  24 min  -SD      OT Received On  04/15/21  -SD      OT Goal Re-Cert Due Date  04/23/21  -SD         Timed Charges    00353 - OT Self Care/Mgmt Minutes  15  -SD        User Key  (r) = Recorded By, (t) = Taken By, (c) = Cosigned By    Initials Name Provider Type    Lucrecia Quinteros OT Occupational Therapist        Therapy Charges for Today     Code Description Service Date Service Provider Modifiers Qty    57444510107  OT THER PROC EA 15 MIN 4/14/2021 Lucrecia Muro OT GO 1    [id number]  OT THERAPEUTIC ACT EA 15 MIN 4/14/2021 Lucrecia Muro, OT GO 1    [id number] HC OT SELF CARE/MGMT/TRAIN EA 15 MIN 4/14/2021 Lucrecia Muro OT GO 1    [id number]  OT SELF CARE/MGMT/TRAIN EA 15 MIN 4/15/2021 Lucrecia Muro OT GO 1               Lucrecia Muro OT  4/15/2021

## 2021-04-15 NOTE — DISCHARGE PLACEMENT REQUEST
"Adrienne Marie (75 y.o. Male)     Date of Birth Social Security Number Address [place] Phone MRN    1945  [address] [facility] [place] [address] [phone] [id number]    Tenriism Marital Status          Jew        Admission Date Admission Type Admitting Provider Attending Provider Department, Room/Bed    21 Elective Jose Carlson MD Cervoni, Thomas D., MD [facility] MED SURG  3, 327/    Discharge Date Discharge Disposition Discharge Destination         Home-Health Care [facility] [place]              Attending Provider: Jose Carlson MD    Allergies: No Known Allergies    Isolation: None   Infection: COVID Screen (preop/placement) (21)   Code Status: CPR    Ht: 172.7 cm (68\")   Wt: 116 kg (255 lb 1.2 oz)    Admission Cmt: None   Principal Problem: Status post total hip replacement, right [Z96.641]                 Active Insurance as of 2021     Primary Coverage     Payor Plan Insurance Group Employer/Plan Group    HUMANA MEDICARE REPLACEMENT HUMANA MEDICARE REPLACEMENT [id number]     Payor Plan Address Payor Plan Phone Number Payor Plan Fax Number Effective Dates    [address] [address] [phone]  2018 - None Entered    [facility] [id number]       Subscriber Name Subscriber Birth Date Member ID       ADRIENNE MARIE 1945 [id number]                 Emergency Contacts      (Rel.) Home Phone Work Phone Mobile Phone    Yolanda Marie (Spouse) [phone] -- [phone]    AJAY MARIE (Daughter) [phone] -- --        Patient:  Adrienne Marie MRN:  [id number]    [facility] [place] [address] :  1945  SSN:    Phone: [phone] Sex:  M     Weight: 116 kg (255 lb 1.2 oz)         Ht Readings from Last 1 Encounters:   21 172.7 cm (68\")         Miscellaneous DME   (Order ID: [id number])    Diagnosis:  Status post total hip replacement, right (Z96.641 [ICD-10-CM] V43.64 [ICD-9-CM])   Quantity:  1     Type of DME: bedside " commode  Length of Need (99 Months = Lifetime): 99 Months = Lifetime        Authorizing Provider's Phone: [phone]   Authorizing Provider:Kike Luu PA-C  Authorizing Provider's NPI: [id number]  Order Entered By: Kike Luu PA-C 4/15/2021  8:34 AM     Electronically signed by: Kike Luu PA-C 4/15/2021  8:34 AM               Discharge Summary      Kike Luu PA-C at 04/15/21 0834     Attestation signed by Jose Carlson MD at 04/15/21 1026    I have reviewed the note and assessment of Ruy Luu PA-C, and concur with his documentation of John Leyva.      Jose Carlson MD  4/15/2021  10:25 EDT                     Name:  John Leyva   Age:  75 y.o.  Sex:  male  :  1945  MRN:  [id number]   Visit Number:  [id number]  Primary Care Physician:  Mahesh Almanza MD  Date of Discharge:  4/15/2021  Admission Date:  2021  6:32 AM      Discharge Diagnosis:       Patient Active Problem List   Diagnosis   • Benign non-nodular prostatic hyperplasia   • Vitamin D deficiency   • Type 2 diabetes mellitus without complication (CMS/HCC)   • Primary gout   • Hypothyroidism   • Hyperlipidemia   • Essential hypertension   • Spinal stenosis of lumbar region with neurogenic claudication   • Primary osteoarthritis of right hip   • Arthralgia of right hip   • Ventral hernia without obstruction or gangrene   • Status post total hip replacement, right       Presenting Problem/History of Present Illness:    Primary osteoarthritis of right hip [M16.11]  Arthralgia of right hip [M25.551]         Consults:     Consults     Date and Time Order Name Status Description    2021 12:40 PM Inpatient Consult to Hospitalist Completed           Procedures Performed:    Procedure(s):  Elective total hip arthroplasty         History of presenting illness:    Patient is status post right total hip arthroplasty.  He tolerated the surgical procedure without complications.  He has  "had an uneventful hospital stay thus far.  He is ready to go home so that he can \"get some rest\" he denies chest pain or shortness of breath.  Denies abdominal pain, dizziness or lightheadedness.  He states the right hip pain is well controlled rating it a 0 out of 10 at rest and 3 out of 10 when working with physical therapy.    Vital Signs:    Temp:  [97.9 °F (36.6 °C)-98.9 °F (37.2 °C)] 98.2 °F (36.8 °C)  Heart Rate:  [66-83] 66  Resp:  [16-18] 18  BP: (114-139)/(63-72) 135/72    Physical Exam:    General Appearance alert, appears stated age and cooperative  Head atraumatic  Eyes conjunctivae and sclerae normal  Ears ears appear intact with no abnormalities noted  Nose no drainage  Throat oral mucosa moist  Neck trachea midline  Lungs respirations regular, respirations even and respirations unlabored  Heart regular rhythm & normal rate  Abdomen soft non-tender  Extremities moves extremities well and Gale's sign negative  Pulses Pulses palpable and equal bilaterally  Skin no bleeding, bruising or rash  Neurologic Mental Status orientated to person, place, time and situation    The original postop Aquacel dressing is still intact without major strikethrough bleeding.     Pertinent Lab Results:     Lab Results (all)     Procedure Component Value Units Date/Time    Basic Metabolic Panel [[id number]]  (Abnormal) Collected: 04/14/21 0637    Specimen: Blood Updated: 04/14/21 0709     Glucose 158 mg/dL      BUN 19 mg/dL      Creatinine 1.01 mg/dL      Sodium 135 mmol/L      Potassium 4.2 mmol/L      Chloride 101 mmol/L      CO2 25.1 mmol/L      Calcium 8.8 mg/dL      eGFR Non African Amer 72 mL/min/1.73      BUN/Creatinine Ratio 18.8     Anion Gap 8.9 mmol/L     Narrative:      GFR Normal >60  Chronic Kidney Disease <60  Kidney Failure <15      CBC & Differential [[id number]]  (Abnormal) Collected: 04/14/21 0637    Specimen: Blood Updated: 04/14/21 0648    Narrative:      The following orders were created for panel order " CBC & Differential.  Procedure                               Abnormality         Status                     ---------                               -----------         ------                     CBC Auto Differential[[id number]]        Abnormal            Final result                 Please view results for these tests on the individual orders.    CBC Auto Differential [[id number]]  (Abnormal) Collected: 04/14/21 0637    Specimen: Blood Updated: 04/14/21 0648     WBC 16.19 10*3/mm3      RBC 4.54 10*6/mm3      Hemoglobin 13.9 g/dL      Hematocrit 40.6 %      MCV 89.4 fL      MCH 30.6 pg      MCHC 34.2 g/dL      RDW 13.2 %      RDW-SD 43.2 fl      MPV 9.9 fL      Platelets 239 10*3/mm3      Neutrophil % 83.0 %      Lymphocyte % 11.5 %      Monocyte % 4.9 %      Eosinophil % 0.0 %      Basophil % 0.1 %      Immature Grans % 0.5 %      Neutrophils, Absolute 13.43 10*3/mm3      Lymphocytes, Absolute 1.86 10*3/mm3      Monocytes, Absolute 0.80 10*3/mm3      Eosinophils, Absolute 0.00 10*3/mm3      Basophils, Absolute 0.02 10*3/mm3      Immature Grans, Absolute 0.08 10*3/mm3      nRBC 0.0 /100 WBC     Tissue Pathology Exam [[id number]] Collected: 04/13/21 0840    Specimen: Bone from Hip, Right Updated: 04/13/21 1355    POC Glucose Once [[id number]]  (Abnormal) Collected: 04/13/21 0712    Specimen: Blood Updated: 04/13/21 0725     Glucose 135 mg/dL      Comment: Serial Number: [id number]:  [id number]             Pertinent Radiology Results:    Imaging Results (All)     Procedure Component Value Units Date/Time    XR Pelvis 1 or 2 View [[id number]] Collected: 04/13/21 1132     Updated: 04/13/21 1458    Narrative:      PROCEDURE: XR PELVIS 1 OR 2 VW-     HISTORY: AP pelvis status post right total hip replacement;  M16.11-Unilateral primary osteoarthritis, right hip; M25.551-Pain in  right hip     COMPARISON: March 29, 2021.     FINDINGS:  There are postsurgical changes of total right hip  arthroplasty. No hardware  complications are identified.         Impression:      Postsurgical changes as above.           Images were reviewed, interpreted, and dictated by Dr. Javad Doherty M.D.  Transcribed by Liss Cadena PA-C.     This report was finalized on 4/13/2021 2:56 PM by Javad Doherty M.D..     [facility] OR Procedure [[id number]] Resulted: 04/13/21 1045     Updated: 04/13/21 1045     [facility] OR Procedure [[id number]] Resulted: 04/13/21 1036     Updated: 04/13/21 1036          Condition on Discharge:      stable      Discharge Disposition:    Home-Health Care [facility] [place]    Discharge Medication:       Discharge Medications      New Medications      Instructions Start Date   Commode Bedside [facility]   Use as needed      docusate sodium 100 MG capsule   100 mg, Oral, 2 Times Daily      enoxaparin 40 MG/0.4ML solution syringe  Commonly known as: Lovenox   40 mg, Subcutaneous, Every 24 Hours Scheduled      HYDROcodone-acetaminophen 7.5-325 MG per tablet  Commonly known as: Norco   Take 1 tablet by mouth Every 6 (Six) Hours As Needed for pain.         Changes to Medications      Instructions Start Date   doxazosin 2 MG tablet  Commonly known as: CARDURA  What changed: when to take this   TAKE ONE TABLET BY MOUTH EVERY NIGHT AT BEDTIME         Continue These Medications      Instructions Start Date   allopurinol 100 MG tablet  Commonly known as: ZYLOPRIM   TAKE ONE TABLET BY MOUTH EVERY DAY      amLODIPine 5 MG tablet  Commonly known as: NORVASC   5 mg, Oral, Daily      atorvastatin 20 MG tablet  Commonly known as: LIPITOR   TAKE ONE TABLET BY MOUTH EVERY DAY      DULoxetine 30 MG capsule  Commonly known as: CYMBALTA   TAKE ONE CAPSULE BY MOUTH EVERY DAY      finasteride 5 MG tablet  Commonly known as: PROSCAR   5 mg, Oral, Daily      lisinopril-hydrochlorothiazide 10-12.5 MG per tablet  Commonly known as: PRINZIDE,ZESTORETIC   1 tablet, Oral, Daily, Patient is due to be seen      metoprolol succinate  MG 24 hr  tablet  Commonly known as: TOPROL-XL   TAKE ONE TABLET BY MOUTH EVERY DAY      multivitamin with minerals tablet tablet   1 tablet, Oral, Daily      Vitamin D 50 MCG (2000 UT) capsule   2,000 Units, Oral, Daily             Discharge Diet:     Diet Instructions     Advance Diet as Tolerated            Activity at Discharge:     Activity Instructions     Change Dressing      Change dressing once daily starting 7th post-operative day, and keep a clean dry dressing in place.    Patient May Shower; No Tub Baths, Pools or Hot Tubs      May change dressing routinely, keep a clean dry dressing in place.    Weight Bearing As Tolerated      Protected weight bearing as tolerated, with cane, crutches or walker as appropriate for condition and safety.          Follow-up Appointments:    Future Appointments   Date Time Provider Department Center   4/26/2021  1:15 PM Jose Carlson MD [facility]   10/4/2021  8:30 AM Mahesh Almanza MD [facility]   10/14/2021  8:30 AM Lloyd Glaser MD [facility]     Additional Instructions for the Follow-ups that You Need to Schedule     Ambulatory Referral to Home Health   As directed      Face to Face Visit Date: 4/15/2021    Follow-up provider for Plan of Care?: I will be treating the patient on an ongoing basis.  Please send me the Plan of Care for signature.    Follow-up provider: JOSE CARLSON [[id number]]    Reason/Clinical Findings: s/p right LASHELL    Describe mobility limitations that make leaving home difficult: decreased mobility    Nursing/Therapeutic Services Requested: Physical Therapy    PT orders: Gait Training Transfer training Total joint pathway Therapeutic exercise    Weight Bearing Status: As Tolerated    Frequency: 1 Week 1         Apply Ice to Affected Extremity Every 2 Hours   As directed      Only 2 hours maximum at a time, only three to four times every 24 hours.    Order Comments: Only 2 hours maximum at a time, only three to four times every  24 hours.          Discharge Follow-up with PCP   As directed       Currently Documented PCP:    Mahesh Almanza MD    PCP Phone Number:    [phone]     Follow Up Details: with patient primary care physician in 1 -3 months as appropriate.         Discharge Follow-up with Specified Provider: Colin Carlson MD   As directed      To: Colin Carlson MD    Follow Up Details: at scheduled appointment - check on [facility] patient appointments for date and time.         Elevate Affected Extremity At or Above Level of Heart   As directed      Keep above level of heart at periodic intervals to reduce swelling.    Order Comments: Keep above level of heart at periodic intervals to reduce swelling.                Test Results Pending at Discharge:    Pending Labs     Order Current Status    Tissue Pathology Exam In process             Kike Luu PA-C  04/15/21  08:37 EDT    Time: Discharge 15 min      Electronically signed by Jose Carlson MD at 04/15/21 7271

## 2021-04-15 NOTE — PLAN OF CARE
Goal Outcome Evaluation:  Plan of Care Reviewed With: patient  Progress: improving  Outcome Summary: OT tx completed. patient completed bed mobility with conditional ind, transfer and functional mobility with SBA, toileting and grooming tasks with sup. Reviewed LASHELL precautions and safety during ADLs and functional mobility. Patient to DC home today.

## 2021-04-15 NOTE — DISCHARGE SUMMARY
"  Name:  John Leyva   Age:  75 y.o.  Sex:  male  :  1945  MRN:  [id number]   Visit Number:  [id number]  Primary Care Physician:  Mahesh Almanza MD  Date of Discharge:  4/15/2021  Admission Date:  2021  6:32 AM      Discharge Diagnosis:       Patient Active Problem List   Diagnosis   • Benign non-nodular prostatic hyperplasia   • Vitamin D deficiency   • Type 2 diabetes mellitus without complication (CMS/[facility])   • Primary gout   • Hypothyroidism   • Hyperlipidemia   • Essential hypertension   • Spinal stenosis of lumbar region with neurogenic claudication   • Primary osteoarthritis of right hip   • Arthralgia of right hip   • Ventral hernia without obstruction or gangrene   • Status post total hip replacement, right       Presenting Problem/History of Present Illness:    Primary osteoarthritis of right hip [M16.11]  Arthralgia of right hip [M25.551]         Consults:     Consults     Date and Time Order Name Status Description    2021 12:40 PM Inpatient Consult to Hospitalist Completed           Procedures Performed:    Procedure(s):  Elective total hip arthroplasty         History of presenting illness:    Patient is status post right total hip arthroplasty.  He tolerated the surgical procedure without complications.  He has had an uneventful hospital stay thus far.  He is ready to go home so that he can \"get some rest\" he denies chest pain or shortness of breath.  Denies abdominal pain, dizziness or lightheadedness.  He states the right hip pain is well controlled rating it a 0 out of 10 at rest and 3 out of 10 when working with physical therapy.    Vital Signs:    Temp:  [97.9 °F (36.6 °C)-98.9 °F (37.2 °C)] 98.2 °F (36.8 °C)  Heart Rate:  [66-83] 66  Resp:  [16-18] 18  BP: (114-139)/(63-72) 135/72    Physical Exam:    General Appearance alert, appears stated age and cooperative  Head atraumatic  Eyes conjunctivae and sclerae normal  Ears ears appear intact with no abnormalities noted  Nose no " drainage  Throat oral mucosa moist  Neck trachea midline  Lungs respirations regular, respirations even and respirations unlabored  Heart regular rhythm & normal rate  Abdomen soft non-tender  Extremities moves extremities well and Gale's sign negative  Pulses Pulses palpable and equal bilaterally  Skin no bleeding, bruising or rash  Neurologic Mental Status orientated to person, place, time and situation    The original postop Aquacel dressing is still intact without major strikethrough bleeding.     Pertinent Lab Results:     Lab Results (all)     Procedure Component Value Units Date/Time    Basic Metabolic Panel [[id number]]  (Abnormal) Collected: 04/14/21 0637    Specimen: Blood Updated: 04/14/21 0709     Glucose 158 mg/dL      BUN 19 mg/dL      Creatinine 1.01 mg/dL      Sodium 135 mmol/L      Potassium 4.2 mmol/L      Chloride 101 mmol/L      CO2 25.1 mmol/L      Calcium 8.8 mg/dL      eGFR Non African Amer 72 mL/min/1.73      BUN/Creatinine Ratio 18.8     Anion Gap 8.9 mmol/L     Narrative:      GFR Normal >60  Chronic Kidney Disease <60  Kidney Failure <15      CBC & Differential [[id number]]  (Abnormal) Collected: 04/14/21 0637    Specimen: Blood Updated: 04/14/21 0648    Narrative:      The following orders were created for panel order CBC & Differential.  Procedure                               Abnormality         Status                     ---------                               -----------         ------                     CBC Auto Differential[[id number]]        Abnormal            Final result                 Please view results for these tests on the individual orders.    CBC Auto Differential [[id number]]  (Abnormal) Collected: 04/14/21 0637    Specimen: Blood Updated: 04/14/21 0648     WBC 16.19 10*3/mm3      RBC 4.54 10*6/mm3      Hemoglobin 13.9 g/dL      Hematocrit 40.6 %      MCV 89.4 fL      MCH 30.6 pg      MCHC 34.2 g/dL      RDW 13.2 %      RDW-SD 43.2 fl      MPV 9.9 fL      Platelets 239  10*3/mm3      Neutrophil % 83.0 %      Lymphocyte % 11.5 %      Monocyte % 4.9 %      Eosinophil % 0.0 %      Basophil % 0.1 %      Immature Grans % 0.5 %      Neutrophils, Absolute 13.43 10*3/mm3      Lymphocytes, Absolute 1.86 10*3/mm3      Monocytes, Absolute 0.80 10*3/mm3      Eosinophils, Absolute 0.00 10*3/mm3      Basophils, Absolute 0.02 10*3/mm3      Immature Grans, Absolute 0.08 10*3/mm3      nRBC 0.0 /100 WBC     Tissue Pathology Exam [[id number]] Collected: 04/13/21 0840    Specimen: Bone from Hip, Right Updated: 04/13/21 1355    POC Glucose Once [[id number]]  (Abnormal) Collected: 04/13/21 0712    Specimen: Blood Updated: 04/13/21 0725     Glucose 135 mg/dL      Comment: Serial Number: [id number]:  [id number]             Pertinent Radiology Results:    Imaging Results (All)     Procedure Component Value Units Date/Time    XR Pelvis 1 or 2 View [[id number]] Collected: 04/13/21 1132     Updated: 04/13/21 1458    Narrative:      PROCEDURE: XR PELVIS 1 OR 2 VW-     HISTORY: AP pelvis status post right total hip replacement;  M16.11-Unilateral primary osteoarthritis, right hip; M25.551-Pain in  right hip     COMPARISON: March 29, 2021.     FINDINGS:  There are postsurgical changes of total right hip  arthroplasty. No hardware complications are identified.         Impression:      Postsurgical changes as above.           Images were reviewed, interpreted, and dictated by Dr. Javad Doherty M.D.  Transcribed by Liss Cadena PA-C.     This report was finalized on 4/13/2021 2:56 PM by Javad Doherty M.D..     [facility] OR Procedure [[id number]] Resulted: 04/13/21 1045     Updated: 04/13/21 1045     [facility] OR Procedure [[id number]] Resulted: 04/13/21 1036     Updated: 04/13/21 1036          Condition on Discharge:      stable      Discharge Disposition:    Home-Health Care [facility] [place]    Discharge Medication:       Discharge Medications      New Medications      Instructions Start Date   Commode Bedside  misc   Use as needed      docusate sodium 100 MG capsule   100 mg, Oral, 2 Times Daily      enoxaparin 40 MG/0.4ML solution syringe  Commonly known as: Lovenox   40 mg, Subcutaneous, Every 24 Hours Scheduled      HYDROcodone-acetaminophen 7.5-325 MG per tablet  Commonly known as: Norco   Take 1 tablet by mouth Every 6 (Six) Hours As Needed for pain.         Changes to Medications      Instructions Start Date   doxazosin 2 MG tablet  Commonly known as: CARDURA  What changed: when to take this   TAKE ONE TABLET BY MOUTH EVERY NIGHT AT BEDTIME         Continue These Medications      Instructions Start Date   allopurinol 100 MG tablet  Commonly known as: ZYLOPRIM   TAKE ONE TABLET BY MOUTH EVERY DAY      amLODIPine 5 MG tablet  Commonly known as: NORVASC   5 mg, Oral, Daily      atorvastatin 20 MG tablet  Commonly known as: LIPITOR   TAKE ONE TABLET BY MOUTH EVERY DAY      DULoxetine 30 MG capsule  Commonly known as: CYMBALTA   TAKE ONE CAPSULE BY MOUTH EVERY DAY      finasteride 5 MG tablet  Commonly known as: PROSCAR   5 mg, Oral, Daily      lisinopril-hydrochlorothiazide 10-12.5 MG per tablet  Commonly known as: PRINZIDE,ZESTORETIC   1 tablet, Oral, Daily, Patient is due to be seen      metoprolol succinate  MG 24 hr tablet  Commonly known as: TOPROL-XL   TAKE ONE TABLET BY MOUTH EVERY DAY      multivitamin with minerals tablet tablet   1 tablet, Oral, Daily      Vitamin D 50 MCG (2000 UT) capsule   2,000 Units, Oral, Daily             Discharge Diet:     Diet Instructions     Advance Diet as Tolerated            Activity at Discharge:     Activity Instructions     Change Dressing      Change dressing once daily starting 7th post-operative day, and keep a clean dry dressing in place.    Patient May Shower; No Tub Baths, Pools or Hot Tubs      May change dressing routinely, keep a clean dry dressing in place.    Weight Bearing As Tolerated      Protected weight bearing as tolerated, with cane, crutches or walker  as appropriate for condition and safety.          Follow-up Appointments:    Future Appointments   Date Time Provider Department Center   4/26/2021  1:15 PM Jose Carlson MD [facility]   10/4/2021  8:30 AM Mahesh Almanza MD [facility] [place]   10/14/2021  8:30 AM Lloyd Glaser MD [facility] [place] [facility]     Additional Instructions for the Follow-ups that You Need to Schedule     Ambulatory Referral to Home Health   As directed      Face to Face Visit Date: 4/15/2021    Follow-up provider for Plan of Care?: I will be treating the patient on an ongoing basis.  Please send me the Plan of Care for signature.    Follow-up provider: JOSE CARLSON [[id number]]    Reason/Clinical Findings: s/p right LASHELL    Describe mobility limitations that make leaving home difficult: decreased mobility    Nursing/Therapeutic Services Requested: Physical Therapy    PT orders: Gait Training Transfer training Total joint pathway Therapeutic exercise    Weight Bearing Status: As Tolerated    Frequency: 1 Week 1         Apply Ice to Affected Extremity Every 2 Hours   As directed      Only 2 hours maximum at a time, only three to four times every 24 hours.    Order Comments: Only 2 hours maximum at a time, only three to four times every 24 hours.          Discharge Follow-up with PCP   As directed       Currently Documented PCP:    Mahesh Almanza MD    PCP Phone Number:    [phone]     Follow Up Details: with patient primary care physician in 1 -3 months as appropriate.         Discharge Follow-up with Specified Provider: Colin Carlson MD   As directed      To: Colin Carlson MD    Follow Up Details: at scheduled appointment - check on [facility] patient appointments for date and time.         Elevate Affected Extremity At or Above Level of Heart   As directed      Keep above level of heart at periodic intervals to reduce swelling.    Order Comments: Keep above level of heart at periodic intervals to reduce swelling.                Test  Results Pending at Discharge:    Pending Labs     Order Current Status    Tissue Pathology Exam In process             Kike Luu PA-C  04/15/21  08:37 EDT    Time: Discharge 15 min

## 2021-04-15 NOTE — PLAN OF CARE
Goal Outcome Evaluation:  Plan of Care Reviewed With: patient     Outcome Summary: patient appropriate for discharge

## 2021-04-15 NOTE — PLAN OF CARE
Goal Outcome Evaluation:  Plan of Care Reviewed With: patient  Progress: improving  Outcome Summary: PT tx completed. Patient is able to perform bed mobility, sit to stand, toilet transfer, w/c transfer (to simulate car transfer) and ascend/descend 5 steps with supervision/CGA (for stairs). Written HEP given and patient able to perform a repeat demonstration and state LASHELL precautions. Anticipate D/C to home later today.

## 2021-04-15 NOTE — THERAPY TREATMENT NOTE
Patient Name: John Leyva  : 1945    MRN: [id number]                              Today's Date: 4/15/2021       Admit Date: 2021    Visit Dx:     ICD-10-CM ICD-9-CM   1. Status post total hip replacement, right  Z96.641 V43.64   2. Primary osteoarthritis of right hip  M16.11 715.15   3. Arthralgia of right hip  M25.551 719.45     Patient Active Problem List   Diagnosis   • Benign non-nodular prostatic hyperplasia   • Vitamin D deficiency   • Type 2 diabetes mellitus without complication (CMS/HCC)   • Primary gout   • Hypothyroidism   • Hyperlipidemia   • Essential hypertension   • Spinal stenosis of lumbar region with neurogenic claudication   • Primary osteoarthritis of right hip   • Arthralgia of right hip   • Ventral hernia without obstruction or gangrene   • Status post total hip replacement, right     Past Medical History:   Diagnosis Date   • Arthritis    • BPH (benign prostatic hyperplasia)    • Cataract, bilateral     s/p surgery   • Diabetes mellitus (CMS/HCC)     no medications    • ED (erectile dysfunction)    • Elevated cholesterol    • Elevated PSA    • Gout    • Hypertension    • Hypogonadism in male    • Hypothyroidism     no medications   • Impaired functional mobility, balance, gait, and endurance    • Low kidney function    • Wears glasses      Past Surgical History:   Procedure Laterality Date   • APPENDECTOMY     • COLONOSCOPY     • ELBOW OLECRANNON BURSA EXCISION Right    • EYE SURGERY Bilateral     cataracts removed   • LUMBAR LAMINECTOMY DISCECTOMY DECOMPRESSION Right 2020    Procedure: LUMBAR LAMINECTOMY L4-5, HEMILAMINECTOMY L3-4 RIGHT;  Surgeon: Paulie Daniels MD;  Location: Replaced by [facility];  Service: Neurosurgery;  Laterality: Right;   • RETINAL DETACHMENT SURGERY Right     x3 surgeries   • SKIN BIOPSY      benign   • TOTAL HIP ARTHROPLASTY Right 2021    Procedure: Elective total hip arthroplasty;  Surgeon: Jose Carlson MD;  Location: [facility] OR;  Service:  Orthopedics;  Laterality: Right;     General Information     Row Name 04/15/21 0908          Physical Therapy Time and Intention    Document Type  therapy note (daily note)  -LM     Mode of Treatment  physical therapy  -LM     Row Name 04/15/21 0908          General Information    Patient Profile Reviewed  yes  -LM     Existing Precautions/Restrictions  fall;right;hip, posterior  -LM     Barriers to Rehab  none identified  -LM     Row Name 04/15/21 0908          Living Environment    Lives With  spouse  -LM     Row Name 04/15/21 0908          Safety Issues, Functional Mobility    Safety Issues Affecting Function (Mobility)  safety precautions follow-through/compliance  -LM     Impairments Affecting Function (Mobility)  pain;endurance/activity tolerance  -LM       User Key  (r) = Recorded By, (t) = Taken By, (c) = Cosigned By    Initials Name Provider Type    LM Shira Jackson, LIZBET Physical Therapist        Mobility     Row Name 04/15/21 0908          Bed Mobility    Bed Mobility  supine-sit  -LM     Supine-Sit Dodge (Bed Mobility)  modified independence  -LM     Assistive Device (Bed Mobility)  bed rails  -     Row Name 04/15/21 0908          Bed-Chair Transfer    Bed-Chair [place] (Transfers)  supervision  -LM     Assistive Device (Bed-Chair Transfers)  walker, front-wheeled  -LM     Row Name 04/15/21 0908          Sit-Stand Transfer    Sit-Stand Dodge (Transfers)  supervision  -LM     Assistive Device (Sit-Stand Transfers)  walker, front-wheeled  -LM     Row Name 04/15/21 0908          Gait/Stairs (Locomotion)    [place] Level (Gait)  supervision  -LM     Assistive Device (Gait)  walker, front-wheeled  -LM     Distance in Feet (Gait)  24'; [facility] to avoid excessive fatigue prior to D/C home later today.  -LM     Handrail Location (Stairs)  left side (ascending)  -LM     Number of Steps (Stairs)  5  -LM     Ascending Technique (Stairs)   step-to-step  -LM     Descending Technique (Stairs)  step-to-step  -LM     Row Name 04/15/21 0908          Mobility    Extremity Weight-bearing Status  right lower extremity  -LM     Right Lower Extremity (Weight-bearing Status)  weight-bearing as tolerated (WBAT)  -LM       User Key  (r) = Recorded By, (t) = Taken By, (c) = Cosigned By    Initials Name Provider Type    Shira Coffey, LIZBET Physical Therapist        Obj/Interventions     Row Name 04/15/21 0908          Motor Skills    Therapeutic Exercise  other (see comments) AP, heel slides, QS, hip ABD/ADD- written HEP given and repeat demonstration given by patient.  -LM     Row Name 04/15/21 09          Balance    Static Sitting Balance  WFL;unsupported;sitting, edge of bed  -LM     Dynamic Sitting Balance  WFL;unsupported;sitting, edge of bed  -LM     Static Standing Balance  WFL;supported  -LM     Dynamic Standing Balance  WFL;supported  -LM       User Key  (r) = Recorded By, (t) = Taken By, (c) = Cosigned By    Initials Name Provider Type    Shira Coffey, PT Physical Therapist        Goals/Plan    No documentation.       Clinical Impression     Row Name 04/15/21 0908          Pain    Additional Documentation  Pain Scale: Numbers Pre/Post-Treatment (Group)  -[facility] Name 04/15/21 0908          Pain Scale: Numbers Pre/Post-Treatment    Pretreatment Pain Rating  0/10 - no pain  -LM     Posttreatment Pain Rating  0/10 - no pain  -LM     Row Name 04/15/21 0908          Plan of Care Review    Plan of Care Reviewed With  patient  -LM     Progress  improving  -LM     Outcome Summary  PT tx completed. Patient is able to perform bed mobility, sit to stand, toilet transfer, w/c transfer (to simulate car transfer) and ascend/descend 5 steps with supervision/CGA (for stairs). Written HEP given and patient able to perform a repeat demonstration and state LASHELL precautions. Anticipate D/C to home later today.  -LM     Row Name 04/15/21 0908          Therapy  Assessment/Plan (PT)    Patient/Family Therapy Goals Statement (PT)  Return home.  -LM     Rehab Potential (PT)  good, to achieve stated therapy goals  -LM     Row Name 04/15/21 0908          Vital Signs    O2 Delivery Pre Treatment  room air  -LM     O2 Delivery Intra Treatment  room air  -LM     O2 Delivery Post Treatment  room air  -LM     Row Name 04/15/21 0908          Positioning and Restraints    Pre-Treatment Position  in bed  -LM     Post Treatment Position  chair  -LM     In Chair  sitting;call light within reach;encouraged to call for assist;with nsg  -LM       User Key  (r) = Recorded By, (t) = Taken By, (c) = Cosigned By    Initials Name Provider Type    Shira Coffey, PT Physical Therapist        Outcome Measures     Row Name 04/15/21 0908          How much help from another person do you currently need...    Turning from your back to your side while in flat bed without using bedrails?  4  -LM     Moving from lying on back to sitting on the side of a flat bed without bedrails?  4  -LM     Moving to and from a bed to a chair (including a wheelchair)?  4  -LM     Standing up from a chair using your arms (e.g., wheelchair, bedside chair)?  4  -LM     Climbing 3-5 steps with a railing?  3  -LM     To walk in hospital room?  4  -LM     AM-PAC 6 Clicks Score (PT)  23  -LM     Row Name 04/15/21 0908          Functional Assessment    Outcome Measure Options  AM-PAC 6 Clicks Basic Mobility (PT)  -LM       User Key  (r) = Recorded By, (t) = Taken By, (c) = Cosigned By    Initials Name Provider Type    Shira Coffey, LIZBET Physical Therapist        Physical Therapy Education                 Title: PT OT SLP Therapies (In Progress)     Topic: Physical Therapy (Done)     Point: Mobility training (Done)     Learning Progress Summary           Patient Acceptance, E,TB,D,H, VU,DU by MARYLOU at 4/15/2021 0943    Comment: Proper sequencing for ascending/descending stairs; written HEP given; LASHELL precautions reinforced.     Acceptance, E,TB, VU by LM at 4/14/2021 1405    Comment: Ther ex for HEP; proper use of RW for safe transfers/ambulation; LASHELL precautions.    Acceptance, E,TB, VU by LM at 4/14/2021 1136    Comment: Ther ex for HEP. Proper use of RW for safe transfers/ambulation. LASHELL precautions    Acceptance, E,TB, VU by LM at 4/13/2021 1450    Comment: Purpose of PT/POC; LASHELL precautions; proper use of RW for safe transfers/ambulation; ther ex for HEP.   Family Acceptance, E,TB, VU by LM at 4/14/2021 1405    Comment: Ther ex for HEP; proper use of RW for safe transfers/ambulation; LASHELL precautions.    Acceptance, E,TB, VU by LM at 4/13/2021 1450    Comment: Purpose of PT/POC; LASHELL precautions; proper use of RW for safe transfers/ambulation; ther ex for HEP.                   Point: Home exercise program (Done)     Learning Progress Summary           Patient Acceptance, E,TB,D,H, VU,DU by LM at 4/15/2021 0945    Comment: Proper sequencing for ascending/descending stairs; written HEP given; LASHELL precautions reinforced.    Acceptance, E,TB, VU by LM at 4/14/2021 1405    Comment: Ther ex for HEP; proper use of RW for safe transfers/ambulation; LASHELL precautions.    Acceptance, E,TB, VU by LM at 4/14/2021 1136    Comment: Ther ex for HEP. Proper use of RW for safe transfers/ambulation. LASHELL precautions    Acceptance, E,TB, VU by LM at 4/13/2021 1450    Comment: Purpose of PT/POC; LASHELL precautions; proper use of RW for safe transfers/ambulation; ther ex for HEP.   Family Acceptance, E,TB, VU by LM at 4/14/2021 1405    Comment: Ther ex for HEP; proper use of RW for safe transfers/ambulation; LASHELL precautions.    Acceptance, E,TB, VU by LM at 4/13/2021 1450    Comment: Purpose of PT/POC; LASHELL precautions; proper use of RW for safe transfers/ambulation; ther ex for HEP.                   Point: Precautions (Done)     Learning Progress Summary           Patient Acceptance, E,TB,D,H, VU,DU by LM at 4/15/2021 0945    Comment: Proper sequencing for  ascending/descending stairs; written HEP given; LASHELL precautions reinforced.    Acceptance, E,TB, VU by  at 4/14/2021 1405    Comment: Ther ex for HEP; proper use of RW for safe transfers/ambulation; LASHELL precautions.    Acceptance, E,TB, VU by  at 4/14/2021 1136    Comment: Ther ex for HEP. Proper use of RW for safe transfers/ambulation. LASHELL precautions    Acceptance, E,TB, VU by  at 4/13/2021 1450    Comment: Purpose of PT/POC; LASHELL precautions; proper use of RW for safe transfers/ambulation; ther ex for HEP.   Family Acceptance, E,TB, VU by  at 4/14/2021 1405    Comment: Ther ex for HEP; proper use of RW for safe transfers/ambulation; LASHELL precautions.    Acceptance, E,TB, VU by  at 4/13/2021 1450    Comment: Purpose of PT/POC; LASHELL precautions; proper use of RW for safe transfers/ambulation; ther ex for HEP.                               User Key     Initials Effective Dates Name Provider Type Discipline     04/03/18 -  Shira Jackson, PT Physical Therapist PT              PT Recommendation and Plan  Planned Therapy Interventions (PT): balance training, bed mobility training, gait training, home exercise program, patient/family education, transfer training, stair training, strengthening  Plan of Care Reviewed With: patient  Progress: improving  Outcome Summary: PT tx completed. Patient is able to perform bed mobility, sit to stand, toilet transfer, w/c transfer (to simulate car transfer) and ascend/descend 5 steps with supervision/CGA (for stairs). Written HEP given and patient able to perform a repeat demonstration and state LASHELL precautions. Anticipate D/C to home later today.     Time Calculation:   PT Charges     Row Name 04/15/21 0946             Time Calculation    Start Time  0908  -      PT Received On  04/15/21  -         Time Calculation- PT    Total Timed Code Minutes- PT  32 minute(s)  -        User Key  (r) = Recorded By, (t) = Taken By, (c) = Cosigned By    Initials Name Provider Type     Shira Coffey, PT Physical Therapist        Therapy Charges for Today     Code Description Service Date Service Provider Modifiers Qty    76926112676 HC GAIT TRAINING EA 15 MIN 4/14/2021 Shira Jackson, PT GP 1    06546277722 HC PT THER PROC EA 15 MIN 4/14/2021 Shira Jackson, PT GP 1    68227332255 HC GAIT TRAINING EA 15 MIN 4/14/2021 Shira Jackson, PT GP 1    36625088107 HC PT THER PROC EA 15 MIN 4/14/2021 Shira Jackson, PT GP 1    31882638224 HC GAIT TRAINING EA 15 MIN 4/15/2021 Shira Jackson, PT GP 1    08201264808 HC PT THER PROC EA 15 MIN 4/15/2021 Shira Jackson, PT GP 1          PT G-Codes  Outcome Measure Options: AM-PAC 6 Clicks Daily Activity (OT)  AM-PAC 6 Clicks Score (PT): 23  AM-PAC 6 Clicks Score (OT): 22    Shira Jackson, PT  4/15/2021

## 2021-04-15 NOTE — PROGRESS NOTES
Case Management Discharge Note                Selected Continued Care - Admitted Since 4/13/2021     Destination    No services have been selected for the patient.              Durable Medical Equipment Coordination complete    Service Provider Selected Services Address Phone Fax Patient Preferred    [facility]  Durable Medical Equipment [address] [address] [phone] --          Dialysis/Infusion    No services have been selected for the patient.              Home Medical Care Coordination complete    Service Provider Selected Services Address Phone Fax Patient Preferred    [facility]  Home Health Services [address] [facility] [address] [phone] --          Therapy    No services have been selected for the patient.              Community Resources    No services have been selected for the patient.                       Final Discharge Disposition Code: 06 - home with home health care

## 2021-04-16 ENCOUNTER — TRANSITIONAL CARE MANAGEMENT TELEPHONE ENCOUNTER (OUTPATIENT)
Dept: CALL CENTER | Facility: HOSPITAL | Age: 76
End: 2021-04-16

## 2021-04-16 LAB
LAB AP CASE REPORT: NORMAL
PATH REPORT.FINAL DX SPEC: NORMAL

## 2021-04-16 NOTE — OUTREACH NOTE
Call Center TCM Note      Responses   [facility] patient discharged from?  [facility]   Does the patient have one of the following disease processes/diagnoses(primary or secondary)?  Total Joint Replacement   Joint surgery performed?  Hip   TCM attempt successful?  Yes   Call start time  1203   Call end time  1219   Has the patient been back in either the hospital or Emergency Department since discharge?  No   Discharge diagnosis  Elective total hip arthroplasty   Is patient permission given to speak with other caregiver?  Yes   List who call center can speak with  Yolanda Leyva, spouse   Person spoke with today (if not patient) and relationship  spouse   Does the patient have all medications related to this admission filled (includes all antibiotics, pain medications, etc.)  Yes   Is the patient taking all medications as directed (includes completed medication regime)?  Yes   Is the patient able to teach back alternate methods of pain control?  Ice, Correct alignment   Does the patient have a follow up appointment with their surgeon?  Yes [4/26/21]   Has the patient kept scheduled appointments due by today?  Yes   Comments  PCP Dr Mahesh Almanza. Wife declines to schedule hospital follow up appt with PCP. She reports that patient saw PCP before the surgery and will contact if needed.    What is the Home health agency?   [facility]   Has home health visited the patient within 72 hours of discharge?  Call prior to 72 hours   What DME was ordered?  [employer], walker, BSC   Has all DME been delivered?  No   DME comments  Have not received BSC from [employer] yet, wife will call this afternoon. Patient does have his walker from [employer].    Psychosocial issues?  No   Has the patient began therapy sessions (either in the home or as an out patient)?  No   Does the patient have a wound vac in place?  N/A   Has the patient fallen since discharge?  No   Did the patient receive a copy of their discharge instructions?  Yes   Nursing  interventions  Reviewed instructions with patient   What is the patient's perception of their functional status since discharge?  Improving   Is the patient able to teach back signs and symptoms of infection?  Temp >100.4 for 24h or longer, Incisional drainage, Increased swelling or redness around incision (not associated with surgical edema)   Is the patient able to teach back how to prevent infection?  Check incision daily, Wash hands before and after touching incision, No tub baths, hot tub or swimming   Is the patient able to teach back signs and symptoms of DVT?  Swelling in calf, Severe pain in calf, Area hot to touch, Redness in calf, Shortness of breath or chest pain   Did the patient implement home safety suggestions from pre-surgery classes if attended?  Yes   If the patient is a current smoker, are they able to teach back resources for cessation?  Not a smoker   Is the patient/caregiver able to teach back the hierarchy of who to call/visit for symptoms/problems? PCP, Specialist, Home health nurse, Urgent Care, ED, 911  Yes   TCM call completed?  Yes          Melanie North RN    4/16/2021, 12:19 EDT

## 2021-04-26 ENCOUNTER — OFFICE VISIT (OUTPATIENT)
Dept: ORTHOPEDIC SURGERY | Facility: CLINIC | Age: 76
End: 2021-04-26

## 2021-04-26 VITALS — WEIGHT: 255 LBS | BODY MASS INDEX: 38.65 KG/M2 | TEMPERATURE: 97.2 F | HEIGHT: 68 IN

## 2021-04-26 DIAGNOSIS — Z96.641 S/P HIP REPLACEMENT, RIGHT: Primary | ICD-10-CM

## 2021-04-26 DIAGNOSIS — G60.0 PERONEAL MUSCULAR ATROPHY SYNDROME: ICD-10-CM

## 2021-04-26 PROCEDURE — 99024 POSTOP FOLLOW-UP VISIT: CPT | Performed by: ORTHOPAEDIC SURGERY

## 2021-04-26 PROCEDURE — 73502 X-RAY EXAM HIP UNI 2-3 VIEWS: CPT | Performed by: ORTHOPAEDIC SURGERY

## 2021-04-28 ENCOUNTER — PROCEDURE VISIT (OUTPATIENT)
Dept: ORTHOPEDIC SURGERY | Facility: CLINIC | Age: 76
End: 2021-04-28

## 2021-04-28 DIAGNOSIS — M79.604 RIGHT LEG PAIN: ICD-10-CM

## 2021-04-28 DIAGNOSIS — R20.2 PARESTHESIA: ICD-10-CM

## 2021-04-28 DIAGNOSIS — M54.17 LUMBOSACRAL NEURITIS: ICD-10-CM

## 2021-04-28 PROCEDURE — 95911 NRV CNDJ TEST 9-10 STUDIES: CPT | Performed by: PHYSICAL THERAPIST

## 2021-04-28 PROCEDURE — 95886 MUSC TEST DONE W/N TEST COMP: CPT | Performed by: PHYSICAL THERAPIST

## 2021-04-28 NOTE — PROGRESS NOTES
Subjective   Patient ID: John Leyva is a 75 y.o. male is here today for a post-operative visit.  Post-op of the Right Hip (S/P elective right total hip arthroplasty on 4/13/21. Patient is doing great, has not take pain meds, just states he has some soreness. )        CHIEF COMPLAINT:    First post-op evaluation    History of Present Illness      Pain controlled: [] no   [x] yes   Medication refill requested: [x] no   [] yes    Patient compliant with instructions: [] no   [x] yes   Other: Reports good progress since surgery.  Ambulating well with walker assist at this early time from surgery.     Past Medical History:   Diagnosis Date   • Arthritis    • BPH (benign prostatic hyperplasia)    • Cataract, bilateral     s/p surgery   • Diabetes mellitus (CMS/HCC)     no medications    • ED (erectile dysfunction)    • Elevated cholesterol    • Elevated PSA    • Gout    • Hypertension    • Hypogonadism in male    • Hypothyroidism     no medications   • Impaired functional mobility, balance, gait, and endurance    • Low kidney function    • Wears glasses         Past Surgical History:   Procedure Laterality Date   • APPENDECTOMY     • COLONOSCOPY     • ELBOW OLECRANNON BURSA EXCISION Right    • EYE SURGERY Bilateral     cataracts removed   • LUMBAR LAMINECTOMY DISCECTOMY DECOMPRESSION Right 6/4/2020    Procedure: LUMBAR LAMINECTOMY L4-5, HEMILAMINECTOMY L3-4 RIGHT;  Surgeon: Paulie Daniels MD;  [facility]: [facility];  Service: Neurosurgery;  Laterality: Right;   • RETINAL DETACHMENT SURGERY Right     x3 surgeries   • SKIN BIOPSY      benign   • TOTAL HIP ARTHROPLASTY Right 4/13/2021    Procedure: Elective total hip arthroplasty;  Surgeon: Jose Carlson MD;  Location: [facility] OR;  Service: Orthopedics;  Laterality: Right;       No Known Allergies    Review of Systems   Constitutional: Negative for fever.   Musculoskeletal: Positive for arthralgias. Negative for gait problem and joint swelling.     I have reviewed the  "medical and surgical history, family history, social history, medications, and/or allergies, and the review of systems of this report.    Objective   Temp 97.2 °F (36.2 °C)   Ht 172.7 cm (68\")   Wt 116 kg (255 lb)   BMI 38.77 kg/m²       Signs of infection: [x] no                    [] yes   Drainage: [x] no                    [] yes   Incision: [x] healing well     []healed well   Motor exam intact: [] no                    [x] yes   Neurovascular exam intact: [] no                    [x] yes   Signs of compartment syndrome: [x] no                    [] yes   Signs of DVT: [x] no                    [] yes   Other:      Physical Exam  Psychiatric:         Speech: Speech normal.       Right Ankle Exam     Muscle Strength   Dorsiflexion:  4/5  Plantar flexion:  4/5  Anterior tibial:  5/5  Posterior tibial:  5/5  Peroneal muscle:  2/5      Right Hip Exam     Tenderness   Right hip tenderness location: diffuse mild soreness.    Range of Motion   Flexion: 100     Muscle Strength   Abduction: 4/5   Adduction: 5/5   Flexion: 4/5     Tests   JANY: negative  Fadir:  Negative FADIR test    Other   Erythema: absent  Scars: present (healing well)  Pulse: present        Extremity DVT signs are negative on physical exam with negative Gale sign and no calf pain  Neurologic Exam     Mental Status   Attention: normal.   Speech: speech is normal   Level of consciousness: alert  Knowledge: good.     Motor Exam   Overall muscle tone: normal    Gait, Coordination, and Reflexes     Gait  Gait: (stable walker assist gait.  No Trendelenburg sign.  He does have a chronic right ankle inversion plantar flexion posture with his gait.  His shoe wear pattern under the right shoe shows that this has been present for a long time.)      Assessment/Plan     Independent Review of Radiographic Studies:    AP pelvis and lateral of the right hip, indication to evaluate status of prosthesis and joint, and compared with prior imaging, shows no acute " fracture or dislocation. Good position and alignment of prosthesis with no radiographic concern.     Laboratory and Other Studies:  No new results reviewed today.     Medical Decision Making:    No complications of procedure and treatments.  Stable neurovascular exam.  He reports no change in at least ten years of a partial right foot drop with noted weakness of ankle eversion, and ankle dorsiflexion primarily from the tibialis anterior motor function.  He is adamant that this foot and ankle limitation has been present for many years and is unchanged and unrelated to his hip surgery.  Stable post-operative exam and expected early progress.     Procedures     Diagnoses and all orders for this visit:    1. S/P hip replacement, right (Primary)  -     XR Hip With or Without Pelvis 2 - 3 View Right; Future    2. Peroneal muscular atrophy syndrome  -     EMG & Nerve Conduction Test    Other orders  -     SCANNED EMG         Recommendations/Plan:     Sutures Staples or Pins [] Removed today  [] At prior visit  [] Plan removal later   Physical therapy: []rehab facility  []outpatient referral  [] therapy ongoing   Ultrasound: []not ordered         []order given to patient   Labs: []not ordered         []order given to patient   Weight Bearing status: []Full []WBAT []PWB []NWB []Other     Discussion of orthopaedic goals and activities and patient and/or guardian expressed appreciation.  Regular exercise as tolerated  Guided on proper techniques for mobility, strength, agility and/or conditioning exercises  Ice, heat, and/or modalities as beneficial  Physical therapy program ongoing  Take prescribed medications as instructed only as tolerated  Aftercare and dental prophylaxis for joint replacement surgery.  Hip replacement precautions reviewed again     Exercise, medications, injections, other patient advice, and return appointment as noted.  Brace: right padded AFO ankle foot orthosis to improve gait.  Referral: Physical and  Occupational Therapy referral.  Test/Studies: EMG both lower extremities  Work/Activity Status: Usual activities, routine exercise as tolerated, no strenuous activity.    Return in about 2 months (around 6/26/2021) for Post-op, recheck.  Patient is encouraged and agreeable to call or return sooner for any issues or concerns.

## 2021-04-29 RX ORDER — DULOXETIN HYDROCHLORIDE 30 MG/1
CAPSULE, DELAYED RELEASE ORAL
Qty: 90 CAPSULE | Refills: 3 | Status: SHIPPED | OUTPATIENT
Start: 2021-04-29 | End: 2022-04-29

## 2021-04-29 RX ORDER — METOPROLOL SUCCINATE 100 MG/1
TABLET, EXTENDED RELEASE ORAL
Qty: 90 TABLET | Refills: 3 | Status: SHIPPED | OUTPATIENT
Start: 2021-04-29 | End: 2022-04-29

## 2021-05-10 ENCOUNTER — OFFICE VISIT (OUTPATIENT)
Dept: ORTHOPEDIC SURGERY | Facility: CLINIC | Age: 76
End: 2021-05-10

## 2021-05-10 VITALS — WEIGHT: 245 LBS | TEMPERATURE: 96.6 F | BODY MASS INDEX: 37.13 KG/M2 | HEIGHT: 68 IN

## 2021-05-10 DIAGNOSIS — R20.2 PARESTHESIA: ICD-10-CM

## 2021-05-10 DIAGNOSIS — M51.36 LUMBAR DEGENERATIVE DISC DISEASE: ICD-10-CM

## 2021-05-10 DIAGNOSIS — Z96.641 S/P HIP REPLACEMENT, RIGHT: Primary | ICD-10-CM

## 2021-05-10 DIAGNOSIS — G60.0 PERONEAL MUSCULAR ATROPHY SYNDROME: ICD-10-CM

## 2021-05-10 DIAGNOSIS — M54.17 LUMBOSACRAL NEURITIS: ICD-10-CM

## 2021-05-10 PROCEDURE — 99024 POSTOP FOLLOW-UP VISIT: CPT | Performed by: ORTHOPAEDIC SURGERY

## 2021-05-10 NOTE — PROGRESS NOTES
Subjective   Patient ID: John Leyva is a 75 y.o. right hand dominant male is here today for a right hip post-operative visit and for review of EMG study results.  Post-op and Results of the Right Hip (Go over EMG results today. He states hip is doing well. )        CHIEF COMPLAINT:    Progress and recovery after surgery.  Review of EMG study    History of Present Illness      Pain controlled: [] no   [x] yes   Medication refill requested: [x] no   [] yes    Patient compliant with instructions: [] no   [x] yes   Other: Reports good progress since surgery.  He reports no hip pain and improved cane assist ambulation.     Past Medical History:   Diagnosis Date   • Arthritis    • BPH (benign prostatic hyperplasia)    • Cataract, bilateral     s/p surgery   • Diabetes mellitus (CMS/HCC)     no medications    • ED (erectile dysfunction)    • Elevated cholesterol    • Elevated PSA    • Gout    • Hypertension    • Hypogonadism in male    • Hypothyroidism     no medications   • Impaired functional mobility, balance, gait, and endurance    • Low kidney function    • Wears glasses         Past Surgical History:   Procedure Laterality Date   • APPENDECTOMY     • COLONOSCOPY     • ELBOW OLECRANNON BURSA EXCISION Right    • EYE SURGERY Bilateral     cataracts removed   • LUMBAR LAMINECTOMY DISCECTOMY DECOMPRESSION Right 6/4/2020    Procedure: LUMBAR LAMINECTOMY L4-5, HEMILAMINECTOMY L3-4 RIGHT;  Surgeon: Paulie Daniels MD;  Location: [facility];  Service: Neurosurgery;  Laterality: Right;   • RETINAL DETACHMENT SURGERY Right     x3 surgeries   • SKIN BIOPSY      benign   • TOTAL HIP ARTHROPLASTY Right 4/13/2021    Procedure: Elective total hip arthroplasty;  Surgeon: Jose Carlson MD;  Location: [facility] OR;  Service: Orthopedics;  Laterality: Right;       No Known Allergies    Review of Systems   Musculoskeletal: Positive for back pain. Negative for joint swelling.   Skin: Positive for color change. Negative for rash.  "  Neurological: Positive for weakness and numbness (occasional, either foot.).     I have reviewed the medical and surgical history, family history, social history, medications, and/or allergies, and the review of systems of this report.    Objective   Temp 96.6 °F (35.9 °C)   Ht 172.7 cm (68\")   Wt 111 kg (245 lb)   BMI 37.25 kg/m²       Signs of infection: [x] no                    [] yes   Drainage: [x] no                    [] yes   Incision: [x] healing well     []healed well   Motor exam intact: [] no                    [x] yes, except chronic right leg distal peroneal muscle weakness.   Neurovascular exam intact: [] no                    [x] yes   Signs of compartment syndrome: [x] no                    [] yes   Signs of DVT: [x] no                    [] yes   Other:      Physical Exam  Vitals reviewed.   Constitutional:       General: He is not in acute distress.     Appearance: He is well-developed.   Skin:     General: Skin is warm and dry.      Findings: No erythema or rash.   Neurological:      Mental Status: He is alert.       Ortho Exam    Extremity DVT signs are negative on physical exam with negative Gale sign and no calf pain  Neurologic Exam    Assessment/Plan     Independent Review of Radiographic Studies:    No new imaging done today.    Laboratory and Other Studies:  EMG shows right greater than left L5-S1 sciatica and consistent with his clinical findings of peroneal weakness and occasional bilateral foot numbness.     Medical Decision Making:    No complications of procedure and treatments.  Stable post-operative exam and expected early progress.  Good progress, significantly improved.  Continue current treatment plan.     Procedures     Diagnoses and all orders for this visit:    1. S/P hip replacement, right (Primary)  -     Ambulatory Referral to Neurosurgery  -     Ambulatory Referral to Physical Therapy Evaluate and treat, POST OP, Ortho; Stretching, ROM, Strengthening    2. " Lumbosacral neuritis  -     Ambulatory Referral to Neurosurgery    3. Paresthesia  -     Ambulatory Referral to Neurosurgery    4. Peroneal muscular atrophy syndrome    5. Lumbar degenerative disc disease         Recommendations/Plan:     Physical therapy: []rehab facility  []outpatient referral  [x] therapy ongoing   Ultrasound: [x]not ordered         []order given to patient   Labs: [x]not ordered         []order given to patient   Weight Bearing status: [x]Full []WBAT []PWB []NWB []Other     Discussion of orthopaedic goals and activities and patient and/or guardian expressed appreciation.  Regular exercise as tolerated  Guided on proper techniques for mobility, strength, agility and/or conditioning exercises  Ice, heat, and/or modalities as beneficial  Physical therapy program ongoing at home and patient also referred for planned outpatient therapy to start next week.  Take prescribed medications as instructed only as tolerated  Aftercare and dental prophylaxis for joint replacement surgery.  Hip replacement precautions reviewed again  He can return to driving as tolerated  He can lay on his side in bed as long as he does not cross his legs.  He reports chronic insomnia, not due to his hip, and he tells me he does not want medication or treatment.     Exercise, medications, injections, other patient advice, and return appointment as noted.  Brace: No brace was given at today's visit.  Referral: Physical and Occupational Therapy referral. He plans to return to his neurosurgeon Dr. Daniels.  Test/Studies: No additional studies ordered at this time.  Work/Activity Status: Usual activities, routine exercise as tolerated, light physical work as tolerated, no strenuous activity.    Return in about 2 months (around 7/10/2021) for Post-op, recheck.  Patient is encouraged and agreeable to call or return sooner for any issues or concerns.

## 2021-05-13 DIAGNOSIS — I10 ESSENTIAL HYPERTENSION: Primary | ICD-10-CM

## 2021-05-14 RX ORDER — AMLODIPINE BESYLATE 5 MG/1
TABLET ORAL
Qty: 90 TABLET | Refills: 3 | Status: SHIPPED | OUTPATIENT
Start: 2021-05-14 | End: 2022-05-16

## 2021-05-27 DIAGNOSIS — I10 ESSENTIAL HYPERTENSION: Primary | ICD-10-CM

## 2021-05-27 RX ORDER — DOXAZOSIN 2 MG/1
TABLET ORAL
Qty: 90 TABLET | Refills: 3 | Status: SHIPPED | OUTPATIENT
Start: 2021-05-27 | End: 2021-10-28

## 2021-06-21 RX ORDER — LISINOPRIL AND HYDROCHLOROTHIAZIDE 12.5; 1 MG/1; MG/1
TABLET ORAL
Qty: 90 TABLET | Refills: 3 | Status: SHIPPED | OUTPATIENT
Start: 2021-06-21 | End: 2022-06-02

## 2021-07-12 ENCOUNTER — OFFICE VISIT (OUTPATIENT)
Dept: ORTHOPEDIC SURGERY | Facility: CLINIC | Age: 76
End: 2021-07-12

## 2021-07-12 VITALS — HEIGHT: 68 IN | BODY MASS INDEX: 37.89 KG/M2 | WEIGHT: 250 LBS | TEMPERATURE: 97.3 F

## 2021-07-12 DIAGNOSIS — M51.36 LUMBAR DEGENERATIVE DISC DISEASE: ICD-10-CM

## 2021-07-12 DIAGNOSIS — M54.40 BACK PAIN OF LUMBAR REGION WITH SCIATICA: ICD-10-CM

## 2021-07-12 DIAGNOSIS — Z96.641 S/P HIP REPLACEMENT, RIGHT: Primary | ICD-10-CM

## 2021-07-12 DIAGNOSIS — G60.0 PERONEAL MUSCULAR ATROPHY SYNDROME: ICD-10-CM

## 2021-07-12 PROCEDURE — 99024 POSTOP FOLLOW-UP VISIT: CPT | Performed by: ORTHOPAEDIC SURGERY

## 2021-07-12 NOTE — PROGRESS NOTES
Subjective   Patient ID: John Leyva is a 76 y.o. male is here today for a post-operative visit.  Post-op of the Right Hip (S/P right hip replacement on 4/13/21. Patient states he is doing well. Has been going to PT @ [facility], they have really helped with ROM. )        CHIEF COMPLAINT: Right hip    Progress and recovery after surgery.    History of Present Illness      Pain controlled: [] no   [x] yes   Medication refill requested: [x] no   [] yes    Patient compliant with instructions: [] no   [x] yes   Other: Reports good progress since surgery.  He reports good relief of his chronic right hip pain.  He plans though has not yet seen Dr. Garcia for spine condition follow-up.  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  Patient uses a right AFO brace part-time for support.  Patient states his ankle mobility is a little better with therapy.     Past Medical History:   Diagnosis Date   • Arthritis    • BPH (benign prostatic hyperplasia)    • Cataract, bilateral     s/p surgery   • Diabetes mellitus (CMS/HCC)     no medications    • ED (erectile dysfunction)    • Elevated cholesterol    • Elevated PSA    • Gout    • Hypertension    • Hypogonadism in male    • Hypothyroidism     no medications   • Impaired functional mobility, balance, gait, and endurance    • Low kidney function    • Wears glasses         Past Surgical History:   Procedure Laterality Date   • APPENDECTOMY     • COLONOSCOPY     • ELBOW OLECRANNON BURSA EXCISION Right    • EYE SURGERY Bilateral     cataracts removed   • LUMBAR LAMINECTOMY DISCECTOMY DECOMPRESSION Right 6/4/2020    Procedure: LUMBAR LAMINECTOMY L4-5, HEMILAMINECTOMY L3-4 RIGHT;  Surgeon: Paulie Daniels MD;  Location: [facility];  Service: Neurosurgery;  Laterality: Right;   • RETINAL DETACHMENT SURGERY Right     x3 surgeries   • SKIN BIOPSY      benign   • TOTAL HIP ARTHROPLASTY Right 4/13/2021    Procedure: Elective total hip  "arthroplasty;  Surgeon: Jose Carlson MD;  Location: [facility];  Service: Orthopedics;  Laterality: Right;       No Known Allergies    Review of Systems   Constitutional: Negative for fever.   Musculoskeletal: Positive for arthralgias and gait problem (using cane, still has chrnonic right ankle peroneael weakness, mild inversion at midstance phase of gait.  He has an AFO brace thought not wearing it today.  He uses the brace part-time.  He is encouraged to use the brace support routinely.).   Skin: Negative for color change and rash.   Neurological: Positive for weakness (right ankle eversion weakness.).     I have reviewed the medical and surgical history, family history, social history, medications, and/or allergies, and the review of systems of this report.    Objective   Temp 97.3 °F (36.3 °C)   Ht 172.7 cm (68\")   Wt 113 kg (250 lb)   BMI 38.01 kg/m²       Signs of infection: [x] no                    [] yes   Drainage: [x] no                    [] yes   Incision: [x] healing well     [x]healed well   Motor exam intact: [] no                    [x] yes   Neurovascular exam intact: [] no                    [x] yes   Signs of compartment syndrome: [x] no                    [] yes   Signs of DVT: [x] no                    [] yes   Other:      Physical Exam  Vitals reviewed.   Constitutional:       General: He is not in acute distress.     Appearance: He is well-developed.   Skin:     General: Skin is warm and dry.      Findings: No erythema or rash.   Neurological:      Mental Status: He is alert.   Psychiatric:         Speech: Speech normal.       Ortho Exam    Extremity DVT signs are negative on physical exam with negative Gale sign, no calf pain, no palpable cords and no skin tone change  Neurologic Exam     Mental Status   Attention: normal.   Speech: speech is normal   Level of consciousness: alert  Knowledge: good.     Motor Exam   Overall muscle tone: normal    Gait, Coordination, and Reflexes "     Gait  Gait: (No Trendelenburg sign, chronic mild limp from right ankle weakness.)      Assessment/Plan     Independent Review of Radiographic Studies:    No new imaging done today.    Laboratory and Other Studies:  Recent results were stable and reviewed with patient.     Medical Decision Making:    No complications of procedure and treatments.  Good progress, significantly improved.  Continue current treatment plan.  Continue using right ankle AFO brace support and work with PT/OT on strength conditioning.   Referral to Dr. Daniels for right ankle weakness, follow up evaluation of lumbar spine condition.    Procedures     Diagnoses and all orders for this visit:    1. S/P hip replacement, right (Primary)  -     Cancel: XR Hip With or Without Pelvis 2 - 3 View Right; Future  -     Ambulatory Referral to Neurosurgery    2. Peroneal muscular atrophy syndrome  -     Ambulatory Referral to Neurosurgery    3. Lumbar degenerative disc disease    4. Back pain of lumbar region with sciatica  Comments:  Right greater than left L5-S1 clinical and on EMG.         Recommendations/Plan:     Sutures Staples or Pins [] Removed today  [x] At prior visit  [] Plan removal later   Physical therapy: []rehab facility  [x]outpatient referral  [x] therapy ongoing with [facility]    Ultrasound: [x]not ordered         []order given to patient   Labs: [x]not ordered         []order given to patient   Weight Bearing status: [x]Full []WBAT []PWB []NWB []Other     Discussion of orthopaedic goals and activities and patient and/or guardian expressed appreciation.  Regular exercise as tolerated  Guided on proper techniques for mobility, strength, agility and/or conditioning exercises  Cast, splint or brace care and assistive device usage instructions given  Physical therapy program ongoing  Aftercare and dental prophylaxis for joint replacement surgery.  Hip replacement precautions reviewed again     Exercise, medications, injections, other patient  advice, and return appointment as noted.  Brace: No brace was given at today's visit.  Referral: Neurosurgeon consult referral.  Test/Studies: No additional studies ordered at this time.  Work/Activity Status: Usual activities, routine exercise as tolerated, light physical work as tolerated, no strenuous activity.    Return in about 3 months (around 10/12/2021) for Recheck.  Patient is encouraged and agreeable to call or return sooner for any issues or concerns.       Answers for HPI/ROS submitted by the patient on 7/10/2021  What is the primary reason for your visit?: Other  Please describe your symptoms.: Post op visit  Have you had these symptoms before?: No  How long have you been having these symptoms?: Greater than 2 weeks  Please describe any probable cause for these symptoms. : Actually, no symptoms; just a pre-op folliwup

## 2021-09-13 ENCOUNTER — TELEPHONE (OUTPATIENT)
Dept: NEUROSURGERY | Facility: CLINIC | Age: 76
End: 2021-09-13

## 2021-09-13 NOTE — TELEPHONE ENCOUNTER
Caller: Yolanda Leyva    Relationship to patient: Emergency Contact    Best call back number: [phone]    Chief complaint: CANCELLATIONS OF APPT    Type of visit: FOLLOW UP    Requested date:      If rescheduling, when is the original appointment: 9/14/2021     Additional notes:PT HAD TO CANCEL APPT FOR 9/14/2021 1:00PM DUE TO FAMILY ISSUES.  PT WILL CALL BACK TO RESCHED WHEN HE CAN COME BACK TO BE SEEN.

## 2021-10-04 ENCOUNTER — OFFICE VISIT (OUTPATIENT)
Dept: INTERNAL MEDICINE | Facility: CLINIC | Age: 76
End: 2021-10-04

## 2021-10-04 VITALS
HEIGHT: 68 IN | HEART RATE: 70 BPM | DIASTOLIC BLOOD PRESSURE: 86 MMHG | OXYGEN SATURATION: 97 % | SYSTOLIC BLOOD PRESSURE: 140 MMHG | BODY MASS INDEX: 37.74 KG/M2 | WEIGHT: 249 LBS | TEMPERATURE: 96.9 F

## 2021-10-04 DIAGNOSIS — E03.9 HYPOTHYROIDISM, UNSPECIFIED TYPE: ICD-10-CM

## 2021-10-04 DIAGNOSIS — E55.9 VITAMIN D DEFICIENCY: ICD-10-CM

## 2021-10-04 DIAGNOSIS — E11.9 TYPE 2 DIABETES MELLITUS WITHOUT COMPLICATION, WITHOUT LONG-TERM CURRENT USE OF INSULIN (HCC): ICD-10-CM

## 2021-10-04 DIAGNOSIS — I10 ESSENTIAL HYPERTENSION: ICD-10-CM

## 2021-10-04 DIAGNOSIS — R53.83 OTHER FATIGUE: ICD-10-CM

## 2021-10-04 DIAGNOSIS — E78.5 HYPERLIPIDEMIA, UNSPECIFIED HYPERLIPIDEMIA TYPE: Primary | ICD-10-CM

## 2021-10-04 PROCEDURE — 99214 OFFICE O/P EST MOD 30 MIN: CPT | Performed by: INTERNAL MEDICINE

## 2021-10-04 NOTE — PROGRESS NOTES
Subjective   John Leyva is a 76 y.o. male.     Chief Complaint   Patient presents with   • Follow-up   • Hypertension   • Fatigue       History of Present Illness   Patient here for follow-up blood pressure slightly elevated patient complains of feeling tired no energy.  Hyperlipidemia stable on medication vitamin D stable on supplemental diabetes is stable on medication hypothyroidism stable on medication.    Current Outpatient Medications:   •  allopurinol (ZYLOPRIM) 100 MG tablet, TAKE ONE TABLET BY MOUTH EVERY DAY (Patient taking differently: Take 100 mg by mouth Daily.), Disp: 90 tablet, Rfl: 3  •  amLODIPine (NORVASC) 5 MG tablet, TAKE ONE TABLET BY MOUTH EVERY DAY, Disp: 90 tablet, Rfl: 3  •  atorvastatin (LIPITOR) 20 MG tablet, TAKE ONE TABLET BY MOUTH EVERY DAY (Patient taking differently: Take 20 mg by mouth Daily.), Disp: 90 tablet, Rfl: 3  •  doxazosin (CARDURA) 2 MG tablet, TAKE ONE TABLET BY MOUTH AT BEDTIME, Disp: 90 tablet, Rfl: 3  •  DULoxetine (CYMBALTA) 30 MG capsule, TAKE ONE CAPSULE BY MOUTH EVERY DAY, Disp: 90 capsule, Rfl: 3  •  finasteride (PROSCAR) 5 MG tablet, Take 1 tablet by mouth Daily., Disp: 90 tablet, Rfl: 2  •  lisinopril-hydrochlorothiazide (PRINZIDE,ZESTORETIC) 10-12.5 MG per tablet, TAKE ONE TABLET BY MOUTH EVERY DAY. (MUST MAKE APPOINTMENT FOR REFILLS), Disp: 90 tablet, Rfl: 3  •  metoprolol succinate XL (TOPROL-XL) 100 MG 24 hr tablet, TAKE ONE TABLET BY MOUTH EVERY DAY, Disp: 90 tablet, Rfl: 3  •  Misc. Devices (Commode Bedside) misc, Use as needed, Disp: 1 each, Rfl: 0  •  Multiple Vitamins-Minerals (MULTIVITAMIN ADULT PO), Take 1 tablet by mouth Daily., Disp: , Rfl:     The following portions of the patient's history were reviewed and updated as appropriate: allergies, current medications, past family history, past medical history, past social history, past surgical history and problem list.    Review of Systems   Constitutional: Negative.    Respiratory: Negative.     Cardiovascular: Negative.    Gastrointestinal: Negative.    Musculoskeletal: Negative.    Skin: Negative.    Neurological: Negative.    Psychiatric/Behavioral: Negative.        Objective   Physical Exam  Cardiovascular:      Rate and Rhythm: Normal rate and regular rhythm.      Heart sounds: Normal heart sounds.   Pulmonary:      Effort: Pulmonary effort is normal.      Breath sounds: Normal breath sounds.   Abdominal:      General: Bowel sounds are normal.   Musculoskeletal:      Cervical back: Neck supple.   Skin:     General: Skin is warm.   Neurological:      Mental Status: He is alert and oriented to person, place, and time.         All tests have been reviewed.    Assessment/Plan   Diagnoses and all orders for this visit:    Hyperlipidemia, continue medication  -     CK  -     Comprehensive Metabolic Panel  -     Lipid Panel    Essential hypertension continue medication    Vitamin D deficiency continue supplement  -     Vitamin D 25 Hydroxy    Type 2 diabetes mellitus without complication, without long-term current use of insulin ([facility]) check lab  -     Hemoglobin A1c    Hypothyroidism, unspecified type continue medication    Other fatigue sleep study patient's BMI 37.  -     CBC & Differential  -     Vitamin B12  -     Folate  -     Ambulatory Referral to Sleep Medicine    colon report patient to get     6 mo annual            Answers for HPI/ROS submitted by the patient on 10/4/2021  What is the primary reason for your visit?: Other  Please describe your symptoms.: 3 month check up  Have you had these symptoms before?: Yes  How long have you been having these symptoms?: Greater than 2 weeks

## 2021-10-05 LAB
25(OH)D3+25(OH)D2 SERPL-MCNC: 48.2 NG/ML (ref 30–100)
ALBUMIN SERPL-MCNC: 4.3 G/DL (ref 3.5–5.2)
ALBUMIN/GLOB SERPL: 1.5 G/DL
ALP SERPL-CCNC: 118 U/L (ref 39–117)
ALT SERPL-CCNC: 16 U/L (ref 1–41)
AST SERPL-CCNC: 20 U/L (ref 1–40)
BASOPHILS # BLD AUTO: 0.06 10*3/MM3 (ref 0–0.2)
BASOPHILS NFR BLD AUTO: 0.7 % (ref 0–1.5)
BILIRUB SERPL-MCNC: 0.6 MG/DL (ref 0–1.2)
BUN SERPL-MCNC: 16 MG/DL (ref 8–23)
BUN/CREAT SERPL: 14.5 (ref 7–25)
CALCIUM SERPL-MCNC: 9.4 MG/DL (ref 8.6–10.5)
CHLORIDE SERPL-SCNC: 101 MMOL/L (ref 98–107)
CHOLEST SERPL-MCNC: 134 MG/DL (ref 0–200)
CK SERPL-CCNC: 267 U/L (ref 20–200)
CO2 SERPL-SCNC: 24 MMOL/L (ref 22–29)
CREAT SERPL-MCNC: 1.1 MG/DL (ref 0.76–1.27)
EOSINOPHIL # BLD AUTO: 0.24 10*3/MM3 (ref 0–0.4)
EOSINOPHIL NFR BLD AUTO: 2.7 % (ref 0.3–6.2)
ERYTHROCYTE [DISTWIDTH] IN BLOOD BY AUTOMATED COUNT: 13.5 % (ref 12.3–15.4)
FOLATE SERPL-MCNC: >20 NG/ML (ref 4.78–24.2)
GLOBULIN SER CALC-MCNC: 2.8 GM/DL
GLUCOSE SERPL-MCNC: 142 MG/DL (ref 65–99)
HBA1C MFR BLD: 6.8 % (ref 4.8–5.6)
HCT VFR BLD AUTO: 44.9 % (ref 37.5–51)
HDLC SERPL-MCNC: 40 MG/DL (ref 40–60)
HGB BLD-MCNC: 15 G/DL (ref 13–17.7)
IMM GRANULOCYTES # BLD AUTO: 0.03 10*3/MM3 (ref 0–0.05)
IMM GRANULOCYTES NFR BLD AUTO: 0.3 % (ref 0–0.5)
LDLC SERPL CALC-MCNC: 73 MG/DL (ref 0–100)
LYMPHOCYTES # BLD AUTO: 2.44 10*3/MM3 (ref 0.7–3.1)
LYMPHOCYTES NFR BLD AUTO: 27.4 % (ref 19.6–45.3)
MCH RBC QN AUTO: 30.2 PG (ref 26.6–33)
MCHC RBC AUTO-ENTMCNC: 33.4 G/DL (ref 31.5–35.7)
MCV RBC AUTO: 90.5 FL (ref 79–97)
MONOCYTES # BLD AUTO: 0.55 10*3/MM3 (ref 0.1–0.9)
MONOCYTES NFR BLD AUTO: 6.2 % (ref 5–12)
NEUTROPHILS # BLD AUTO: 5.57 10*3/MM3 (ref 1.7–7)
NEUTROPHILS NFR BLD AUTO: 62.7 % (ref 42.7–76)
NRBC BLD AUTO-RTO: 0 /100 WBC (ref 0–0.2)
PLATELET # BLD AUTO: 269 10*3/MM3 (ref 140–450)
POTASSIUM SERPL-SCNC: 4.8 MMOL/L (ref 3.5–5.2)
PROT SERPL-MCNC: 7.1 G/DL (ref 6–8.5)
RBC # BLD AUTO: 4.96 10*6/MM3 (ref 4.14–5.8)
SODIUM SERPL-SCNC: 136 MMOL/L (ref 136–145)
TRIGL SERPL-MCNC: 116 MG/DL (ref 0–150)
VIT B12 SERPL-MCNC: 739 PG/ML (ref 211–946)
VLDLC SERPL CALC-MCNC: 21 MG/DL (ref 5–40)
WBC # BLD AUTO: 8.89 10*3/MM3 (ref 3.4–10.8)

## 2021-10-11 ENCOUNTER — OFFICE VISIT (OUTPATIENT)
Dept: ORTHOPEDIC SURGERY | Facility: CLINIC | Age: 76
End: 2021-10-11

## 2021-10-11 VITALS — HEIGHT: 68 IN | WEIGHT: 251 LBS | BODY MASS INDEX: 38.04 KG/M2 | TEMPERATURE: 97.8 F

## 2021-10-11 DIAGNOSIS — Z96.641 STATUS POST TOTAL HIP REPLACEMENT, RIGHT: Primary | ICD-10-CM

## 2021-10-11 PROCEDURE — 99213 OFFICE O/P EST LOW 20 MIN: CPT | Performed by: ORTHOPAEDIC SURGERY

## 2021-10-11 NOTE — PROGRESS NOTES
Subjective   Patient ID: John Leyva is a 76 y.o. right hand dominant male is here today for orthopaedic evaluation of recovery progress with treatment.  Follow-up of the Right Hip (Status post total hip arthroplasty 4/13/21. States physical therapy has improved walking and range of motion.)       CHIEF COMPLAINT:    Progress and recovery six months after right total hip replacement surgery and physical therapy rehabilitation.    History of Present Illness     Progress Note:  Patient reports that with treatments and since the last visit, overall right hip pain is resolved.  Mobility, strength and independent ambulation are improved with his physical therapy program.  He tells me he is very satisfied with his right total hip replacement pain relief and functional recovery.  The patient is not currently taking pain medication .   Physical therapy has been effective.  Since last visit, patient is retired though remains active.     Past Medical History:   Diagnosis Date   • Arthritis    • BPH (benign prostatic hyperplasia)    • Cataract, bilateral     s/p surgery   • Diabetes mellitus (HCC)     no medications    • ED (erectile dysfunction)    • Elevated cholesterol    • Elevated PSA    • Gout    • Hypertension    • Hypogonadism in male    • Hypothyroidism     no medications   • Impaired functional mobility, balance, gait, and endurance    • Low kidney function    • Wears glasses         Past Surgical History:   Procedure Laterality Date   • APPENDECTOMY     • COLONOSCOPY     • ELBOW OLECRANNON BURSA EXCISION Right    • EYE SURGERY Bilateral     cataracts removed   • LUMBAR LAMINECTOMY DISCECTOMY DECOMPRESSION Right 6/4/2020    Procedure: LUMBAR LAMINECTOMY L4-5, HEMILAMINECTOMY L3-4 RIGHT;  Surgeon: Paulie Daniels MD;  Location: [facility];  Service: Neurosurgery;  Laterality: Right;   • RETINAL DETACHMENT SURGERY Right     x3 surgeries   • SKIN BIOPSY      benign   • TOTAL HIP ARTHROPLASTY Right 4/13/2021     "Procedure: Elective total hip arthroplasty;  Surgeon: Jose Carlson MD;  Location: [facility];  Service: Orthopedics;  Laterality: Right;        Family History   Problem Relation Age of Onset   • Cancer Other    • Cancer Mother    • Cancer Father         Bone cancer        Social History     Socioeconomic History   • Marital status:      Spouse name: Yolanda   Tobacco Use   • Smoking status: Former Smoker     Packs/day: 3.00     Years: 10.00     Pack years: 30.00     Types: Cigarettes     Quit date:      Years since quittin.8   • Smokeless tobacco: Never Used   Vaping Use   • Vaping Use: Never used   Substance and Sexual Activity   • Alcohol use: Not Currently   • Drug use: No   • Sexual activity: Defer       No Known Allergies    Review of Systems   Constitutional: Negative for fever.   Musculoskeletal: Positive for arthralgias (chronic arthritis, no right hip pain.) and back pain (chronic). Negative for gait problem and joint swelling.   Skin: Negative for color change and rash.   Neurological: Negative for weakness.       I have reviewed the medical and surgical history, family history, social history, medications, and/or allergies, and the review of systems of this report.    Objective   Temp 97.8 °F (36.6 °C)   Ht 172.7 cm (68\")   Wt 114 kg (251 lb)   BMI 38.16 kg/m²   Physical Exam  Vitals reviewed.   Constitutional:       General: He is not in acute distress.     Appearance: He is well-developed.   Skin:     General: Skin is warm and dry.      Findings: No erythema or rash.   Neurological:      Mental Status: He is alert.      Gait: Gait is intact.   Psychiatric:         Speech: Speech normal.       Right Hip Exam     Tenderness   The patient is experiencing no tenderness.     Range of Motion   Abduction: 35   Flexion: 110   External rotation: 30     Muscle Strength   Abduction: 5/5   Adduction: 5/5   Flexion: 5/5     Tests   JANY: negative  Fadir:  Negative FADIR test    Other "   Erythema: absent  Scars: present (well healed)  Pulse: present        Extremity DVT signs are negative on physical exam with negative Gale sign and no calf pain   Neurologic Exam     Mental Status   Attention: normal.   Speech: speech is normal   Level of consciousness: alert  Knowledge: good.     Motor Exam   Overall muscle tone: normal    Gait, Coordination, and Reflexes     Gait  Gait: normal (No Trendelenburg sign.)      Assessment/Plan     Independent Review of Radiographic Studies:    No new imaging done today.    Laboratory and Other Studies:  No new results reviewed today.     Medical Decision Making:    No complications of procedure and treatments.  Excellent progress.  Routine activity, work, sports and/or exercise as tolerated.    Procedures    Diagnoses and all orders for this visit:    1. Status post total hip replacement, right (Primary)       Discussion of orthopaedic goals and activities and patient and/or guardian expressed appreciation.  Regular exercise as tolerated  Guided on proper techniques for mobility, strength, agility and/or conditioning exercises  Home exercise program ongoing  After care and dental prophylaxis for joint replacement prosthesis  Hip replacement precautions reviewed again.    Recommendations/Plan:  Exercise, medications, injections, other patient advice, and return appointment as noted.  Brace: No brace was given at today's visit.  Referral: No referrals made at today's visit.  Test/Studies: No additional studies ordered at this time.  Work/Activity Status: Usual activities, routine exercise as tolerated, light physical work as tolerated, no strenuous activity.    Return in about 6 months (around 4/11/2022) for Annual recheck, XOA pelvis and right hip.  Patient is encouraged and agreeable to call or return sooner for any issues or concerns.

## 2021-10-15 RX ORDER — ATORVASTATIN CALCIUM 20 MG/1
TABLET, FILM COATED ORAL
Qty: 90 TABLET | Refills: 3 | Status: SHIPPED | OUTPATIENT
Start: 2021-10-15 | End: 2022-10-14

## 2021-10-28 ENCOUNTER — OFFICE VISIT (OUTPATIENT)
Dept: UROLOGY | Facility: CLINIC | Age: 76
End: 2021-10-28

## 2021-10-28 VITALS
SYSTOLIC BLOOD PRESSURE: 134 MMHG | BODY MASS INDEX: 38.04 KG/M2 | HEIGHT: 68 IN | WEIGHT: 251 LBS | DIASTOLIC BLOOD PRESSURE: 86 MMHG | HEART RATE: 60 BPM | OXYGEN SATURATION: 93 %

## 2021-10-28 DIAGNOSIS — I10 HYPERTENSION, UNSPECIFIED TYPE: ICD-10-CM

## 2021-10-28 DIAGNOSIS — N40.1 BPH WITH OBSTRUCTION/LOWER URINARY TRACT SYMPTOMS: Primary | ICD-10-CM

## 2021-10-28 DIAGNOSIS — N13.8 BPH WITH OBSTRUCTION/LOWER URINARY TRACT SYMPTOMS: Primary | ICD-10-CM

## 2021-10-28 LAB
BILIRUB BLD-MCNC: NEGATIVE MG/DL
CLARITY, POC: CLEAR
COLOR UR: YELLOW
EXPIRATION DATE: NORMAL
GLUCOSE UR STRIP-MCNC: NEGATIVE MG/DL
KETONES UR QL: NEGATIVE
LEUKOCYTE EST, POC: NEGATIVE
Lab: NORMAL
NITRITE UR-MCNC: NEGATIVE MG/ML
PH UR: 6 [PH] (ref 5–8)
PROT UR STRIP-MCNC: NEGATIVE MG/DL
RBC # UR STRIP: NEGATIVE /UL
SP GR UR: 1.01 (ref 1–1.03)
UROBILINOGEN UR QL: NORMAL

## 2021-10-28 PROCEDURE — 81003 URINALYSIS AUTO W/O SCOPE: CPT | Performed by: UROLOGY

## 2021-10-28 PROCEDURE — 99214 OFFICE O/P EST MOD 30 MIN: CPT | Performed by: UROLOGY

## 2021-10-28 NOTE — PROGRESS NOTES
Chief Complaint   Patient presents with   • Yearly     Yearly Follow Up on BPH with LUTS        HPI  Ms. Leyva is a 76 y.o. male with history below in assessment, who presents for follow up.     At this visit he is happy with LUTS. Has never tried stopping Rx.  His IPSS score today is 5.    For historical review, the patient has been on maximal medical therapy with doxazosin and finasteride for BPH.  His AUA/IPSS score in the past was 17.  PSA was within normal limits in the past as below.    Past Medical History:   Diagnosis Date   • Arthritis    • BPH (benign prostatic hyperplasia)    • Cataract, bilateral     s/p surgery   • Diabetes mellitus (HCC)     no medications    • ED (erectile dysfunction)    • Elevated cholesterol    • Elevated PSA    • Gout    • Hypertension    • Hypogonadism in male    • Hypothyroidism     no medications   • Impaired functional mobility, balance, gait, and endurance    • Low kidney function    • Wears glasses        Past Surgical History:   Procedure Laterality Date   • APPENDECTOMY     • COLONOSCOPY     • ELBOW OLECRANNON BURSA EXCISION Right    • EYE SURGERY Bilateral     cataracts removed   • LUMBAR LAMINECTOMY DISCECTOMY DECOMPRESSION Right 6/4/2020    Procedure: LUMBAR LAMINECTOMY L4-5, HEMILAMINECTOMY L3-4 RIGHT;  Surgeon: Paulie Daniels MD;  Location: [facility];  Service: Neurosurgery;  Laterality: Right;   • RETINAL DETACHMENT SURGERY Right     x3 surgeries   • SKIN BIOPSY      benign   • TOTAL HIP ARTHROPLASTY Right 4/13/2021    Procedure: Elective total hip arthroplasty;  Surgeon: Jose Carlson MD;  Location: [facility];  Service: Orthopedics;  Laterality: Right;         Current Outpatient Medications:   •  allopurinol (ZYLOPRIM) 100 MG tablet, TAKE ONE TABLET BY MOUTH EVERY DAY (Patient taking differently: Take 100 mg by mouth Daily.), Disp: 90 tablet, Rfl: 3  •  amLODIPine (NORVASC) 5 MG tablet, TAKE ONE TABLET BY MOUTH EVERY DAY, Disp: 90 tablet, Rfl: 3  •   "atorvastatin (LIPITOR) 20 MG tablet, TAKE ONE TABLET BY MOUTH EVERY DAY, Disp: 90 tablet, Rfl: 3  •  doxazosin (CARDURA) 2 MG tablet, TAKE ONE TABLET BY MOUTH AT BEDTIME, Disp: 90 tablet, Rfl: 3  •  DULoxetine (CYMBALTA) 30 MG capsule, TAKE ONE CAPSULE BY MOUTH EVERY DAY, Disp: 90 capsule, Rfl: 3  •  finasteride (PROSCAR) 5 MG tablet, Take 1 tablet by mouth Daily., Disp: 90 tablet, Rfl: 2  •  lisinopril-hydrochlorothiazide (PRINZIDE,ZESTORETIC) 10-12.5 MG per tablet, TAKE ONE TABLET BY MOUTH EVERY DAY. (MUST MAKE APPOINTMENT FOR REFILLS), Disp: 90 tablet, Rfl: 3  •  metoprolol succinate XL (TOPROL-XL) 100 MG 24 hr tablet, TAKE ONE TABLET BY MOUTH EVERY DAY, Disp: 90 tablet, Rfl: 3  •  Misc. Devices (Commode Bedside) misc, Use as needed, Disp: 1 each, Rfl: 0  •  Multiple Vitamins-Minerals (MULTIVITAMIN ADULT PO), Take 1 tablet by mouth Daily., Disp: , Rfl:      Physical Exam  Visit Vitals  Ht 172.7 cm (68\")   Wt 114 kg (251 lb)   BMI 38.16 kg/m²       Labs  Brief Urine Lab Results  (Last result in the past 365 days)      Color   Clarity   Blood   Leuk Est   Nitrite   Protein   CREAT   Urine HCG        04/09/21 1109 Yellow   Clear   Negative   Negative   Negative   Negative                 Lab Results   Component Value Date    GLUCOSE 158 (H) 04/14/2021    CALCIUM 9.4 10/04/2021     10/04/2021    K 4.8 10/04/2021    CO2 24.0 10/04/2021     10/04/2021    BUN 16 10/04/2021    CREATININE 1.10 10/04/2021    EGFRIFAFRI 79 10/04/2021    EGFRIFNONA 65 10/04/2021    BCR 14.5 10/04/2021    ANIONGAP 8.9 04/14/2021       Lab Results   Component Value Date    WBC 8.89 10/04/2021    HGB 15.0 10/04/2021    HCT 44.9 10/04/2021    MCV 90.5 10/04/2021     10/04/2021       Lab Results   Component Value Date    PSA 1.130 10/13/2020    PSA 2.240 09/23/2019    PSA 1.770 09/15/2017       Lab Results   Component Value Date    TESTOSTERONE 469.5 06/13/2016        Radiographic Studies  No Images in the past 120 days " found..    PVR  Post-void residual performed with ultrasound scanner by staff and interpreted by me - 12 mL    I have reviewed above labs and imaging.     Assessment  76 y.o. male with history of lower urinary tract symptoms, notably nocturia, intermittency.  He has stable hypertension.    Plan  1. Stop doxazosin and finasteride; told pt to watch BP  2. FU in 6 mo with repeat IPSS

## 2021-11-22 RX ORDER — ALLOPURINOL 100 MG/1
TABLET ORAL
Qty: 90 TABLET | Refills: 3 | Status: SHIPPED | OUTPATIENT
Start: 2021-11-22 | End: 2022-11-09

## 2021-11-22 NOTE — TELEPHONE ENCOUNTER
Rx Refill Note  Requested Prescriptions     Pending Prescriptions Disp Refills   • allopurinol (ZYLOPRIM) 100 MG tablet [Pharmacy Med Name: allopurinol 100 mg tablet] 90 tablet 3     Sig: TAKE ONE TABLET BY MOUTH EVERY DAY      Last office visit with prescribing clinician: 10/4/2021      Next office visit with prescribing clinician: 4/5/2022            Thuy Borjas MA  11/22/21, 11:05 EST

## 2021-12-28 ENCOUNTER — OFFICE VISIT (OUTPATIENT)
Dept: ORTHOPEDIC SURGERY | Facility: CLINIC | Age: 76
End: 2021-12-28

## 2021-12-28 VITALS — WEIGHT: 251 LBS | BODY MASS INDEX: 38.04 KG/M2 | TEMPERATURE: 98 F | HEIGHT: 68 IN

## 2021-12-28 DIAGNOSIS — M25.551 ARTHRALGIA OF RIGHT HIP: Primary | ICD-10-CM

## 2021-12-28 DIAGNOSIS — Z96.641 STATUS POST TOTAL HIP REPLACEMENT, RIGHT: ICD-10-CM

## 2021-12-28 DIAGNOSIS — M70.61 GREATER TROCHANTERIC BURSITIS OF RIGHT HIP: ICD-10-CM

## 2021-12-28 DIAGNOSIS — M46.1 SACROILIAC INFLAMMATION (HCC): ICD-10-CM

## 2021-12-28 PROCEDURE — 20610 DRAIN/INJ JOINT/BURSA W/O US: CPT | Performed by: PHYSICIAN ASSISTANT

## 2021-12-28 PROCEDURE — 99213 OFFICE O/P EST LOW 20 MIN: CPT | Performed by: PHYSICIAN ASSISTANT

## 2021-12-28 RX ORDER — METHYLPREDNISOLONE ACETATE 40 MG/ML
40 INJECTION, SUSPENSION INTRA-ARTICULAR; INTRALESIONAL; INTRAMUSCULAR; SOFT TISSUE
Status: COMPLETED | OUTPATIENT
Start: 2021-12-28 | End: 2021-12-28

## 2021-12-28 RX ORDER — DOXAZOSIN 2 MG/1
2 TABLET ORAL
COMMUNITY
Start: 2021-12-06 | End: 2022-05-26

## 2021-12-28 RX ORDER — LIDOCAINE HYDROCHLORIDE 10 MG/ML
2 INJECTION, SOLUTION INFILTRATION; PERINEURAL
Status: COMPLETED | OUTPATIENT
Start: 2021-12-28 | End: 2021-12-28

## 2021-12-28 RX ADMIN — LIDOCAINE HYDROCHLORIDE 2 ML: 10 INJECTION, SOLUTION INFILTRATION; PERINEURAL at 09:52

## 2021-12-28 RX ADMIN — METHYLPREDNISOLONE ACETATE 40 MG: 40 INJECTION, SUSPENSION INTRA-ARTICULAR; INTRALESIONAL; INTRAMUSCULAR; SOFT TISSUE at 09:52

## 2021-12-28 NOTE — PATIENT INSTRUCTIONS
Hip Bursitis    Hip bursitis is swelling of one or more fluid-filled sacs (bursae) in your hip joint. This condition can cause pain, and your symptoms may come and go over time.  What are the causes?  · Repeated use of your hip muscles.  · Injury to the hip.  · Weak butt muscles.  · Bone spurs.  · Infection.  In some cases, the cause may not be known.  What increases the risk?  You are more likely to develop this condition if:  · You had a past hip injury or hip surgery.  · You have a condition, such as arthritis, gout, diabetes, or thyroid disease.  · You have spine problems.  · You have one leg that is shorter than the other.  · You run a lot or do long-distance running.  · You play sports where there is a risk of injury or falling, such as football, martial arts, or skiing.  What are the signs or symptoms?  Symptoms may come and go, and they often include:  · Pain in the hip or groin area. Pain may get worse when you move your hip.  · Tenderness and swelling of the hip.  In rare cases, the bursa may become infected. If this happens, you may get a fever, as well as have warmth and redness in the hip area.  How is this treated?  This condition is treated by:  · Resting your hip.  · Icing your hip.  · Wrapping the hip area with an elastic bandage (compression wrap).  · Keeping the hip raised.  Other treatments may include medicine, draining fluid out of the bursa, or using crutches, a cane, or a walker. Surgery may be needed, but this is rare.  Long-term treatment may include doing exercises to help your strength and flexibility. It may also include lifestyle changes like losing weight to lessen the strain on your hip.  Follow these instructions at home:  Managing pain, stiffness, and swelling         · If told, put ice on the painful area.  ? Put ice in a plastic bag.  ? Place a towel between your skin and the bag.  ? Leave the ice on for 20 minutes, 2-3 times a day.  · Raise your hip by putting a pillow under your  hips while you lie down. Stop if you feel pain.  · If told, put heat on the affected area. Do this as often as told by your doctor. Use a moist heat pack or a heating pad as told by your doctor.  ? Place a towel between your skin and the heat source.  ? Leave the heat on for 20-30 minutes.  ? Take off the heat if your skin turns bright red. This is very important if you are unable to feel pain, heat, or cold. You may have a greater risk of getting burned.  Activity  · Do not use your hip to support your body weight until your doctor says that you can.  · Use crutches, a cane, or a walker as told by your doctor.  · If the affected leg is one that you use to drive, ask your doctor if it is safe to drive.  · Rest and protect your hip as much as you can until you feel better.  · Return to your normal activities as told by your doctor. Ask your doctor what activities are safe for you.  · Do exercises as told by your doctor.  General instructions  · Take over-the-counter and prescription medicines only as told by your doctor.  · Gently rub and stretch your injured area as often as is comfortable.  · Wear elastic bandages only as told by your doctor.  · If one of your legs is shorter than the other, get fitted for a shoe insert or orthotic.  · Keep a healthy weight. Follow instructions from your doctor.  · Keep all follow-up visits as told by your doctor. This is important.  How is this prevented?  · Exercise regularly, as told by your doctor.  · Wear the right shoes for the sport you play.  · Warm up and stretch before being active. Cool down and stretch after being active.  · Take breaks often from repeated activity.  · Avoid activities that bother your hip or cause pain.  · Avoid sitting down for a long time.  Where to find more information  · [employer]: orthoinfo.aaos.org  Contact a doctor if:  · You have a fever.  · You have new symptoms.  · You have trouble walking or doing everyday  activities.  · You have pain that gets worse or does not get better with medicine.  · Your skin around your hip is red.  · You get a feeling of warmth in your hip area.  Get help right away if:  · You cannot move your hip.  · You have very bad pain.  · You cannot control the muscles in your feet.  Summary  · Hip bursitis is swelling of one or more fluid-filled sacs (bursae) in your hip joint.  · Symptoms often come and go over time.  · This condition is often treated by resting and icing the hip. It also may help to keep the area raised and wrapped in an elastic bandage. Other treatments may be needed.  This information is not intended to replace advice given to you by your health care provider. Make sure you discuss any questions you have with your health care provider.  Document Revised: 10/19/2020 Document Reviewed: 08/26/2019  Elsevier Patient Education © 2021 [employer]

## 2021-12-28 NOTE — PROGRESS NOTES
Subjective   Patient ID: John Leyva is a 76 y.o. right hand dominant male  Follow-up of the Right Hip (States he has been in PT and that was going great, he went on a couple trips and when he got back home his hip has started hurting. It's better in the morning when he gets up but after a bit it starts hurting again. THR 4/13/21.)         History of Present Illness  Patient began having pain to right hip on or around 12-10-21 after traveling for an extensive period of time.   Before the traveling, his right hip was doing great.   The pain is worst with sitting for long periods and improves after standing and walking around. Denies anterior groin pain, numbness, tingling or radiation of pain.   He mentions his neurosurgeon Dr. Daniels proposed Lumbar spine treatment ? L5 per patient but the patient held off due to fear of covid.                                                   Past Medical History:   Diagnosis Date   • Arthritis    • BPH (benign prostatic hyperplasia)    • Cataract, bilateral     s/p surgery   • Diabetes mellitus (HCC)     no medications    • ED (erectile dysfunction)    • Elevated cholesterol    • Elevated PSA    • Gout    • Hypertension    • Hypogonadism in male    • Hypothyroidism     no medications   • Impaired functional mobility, balance, gait, and endurance    • Low kidney function    • Wears glasses         Past Surgical History:   Procedure Laterality Date   • APPENDECTOMY     • COLONOSCOPY     • ELBOW OLECRANNON BURSA EXCISION Right    • EYE SURGERY Bilateral     cataracts removed   • LUMBAR LAMINECTOMY DISCECTOMY DECOMPRESSION Right 6/4/2020    Procedure: LUMBAR LAMINECTOMY L4-5, HEMILAMINECTOMY L3-4 RIGHT;  Surgeon: Paulie Daniels MD;  Location: [facility];  Service: Neurosurgery;  Laterality: Right;   • RETINAL DETACHMENT SURGERY Right     x3 surgeries   • SKIN BIOPSY      benign   • TOTAL HIP ARTHROPLASTY Right 4/13/2021    Procedure: Elective total hip arthroplasty;  Surgeon:  Jose Carlson MD;  Location: [facility];  Service: Orthopedics;  Laterality: Right;       Family History   Problem Relation Age of Onset   • Cancer Other    • Cancer Mother    • Cancer Father         Bone cancer       Social History     Socioeconomic History   • Marital status:      Spouse name: Yolanda   Tobacco Use   • Smoking status: Former Smoker     Packs/day: 3.00     Years: 10.00     Pack years: 30.00     Types: Cigarettes     Quit date:      Years since quittin.0   • Smokeless tobacco: Never Used   Vaping Use   • Vaping Use: Never used   Substance and Sexual Activity   • Alcohol use: Not Currently   • Drug use: No   • Sexual activity: Defer         Current Outpatient Medications:   •  allopurinol (ZYLOPRIM) 100 MG tablet, TAKE ONE TABLET BY MOUTH EVERY DAY, Disp: 90 tablet, Rfl: 3  •  amLODIPine (NORVASC) 5 MG tablet, TAKE ONE TABLET BY MOUTH EVERY DAY, Disp: 90 tablet, Rfl: 3  •  atorvastatin (LIPITOR) 20 MG tablet, TAKE ONE TABLET BY MOUTH EVERY DAY, Disp: 90 tablet, Rfl: 3  •  doxazosin (CARDURA) 2 MG tablet, Take 2 mg by mouth every night at bedtime., Disp: , Rfl:   •  DULoxetine (CYMBALTA) 30 MG capsule, TAKE ONE CAPSULE BY MOUTH EVERY DAY, Disp: 90 capsule, Rfl: 3  •  lisinopril-hydrochlorothiazide (PRINZIDE,ZESTORETIC) 10-12.5 MG per tablet, TAKE ONE TABLET BY MOUTH EVERY DAY. (MUST MAKE APPOINTMENT FOR REFILLS), Disp: 90 tablet, Rfl: 3  •  metoprolol succinate XL (TOPROL-XL) 100 MG 24 hr tablet, TAKE ONE TABLET BY MOUTH EVERY DAY, Disp: 90 tablet, Rfl: 3  •  Misc. Devices (Commode Bedside) misc, Use as needed, Disp: 1 each, Rfl: 0  •  Multiple Vitamins-Minerals (MULTIVITAMIN ADULT PO), Take 1 tablet by mouth Daily., Disp: , Rfl:     No Known Allergies    Review of Systems   Constitutional: Negative for fever.   HENT: Negative for dental problem and voice change.    Eyes: Negative for visual disturbance.   Respiratory: Negative for shortness of breath.    Cardiovascular: Negative  "for chest pain.   Gastrointestinal: Negative for abdominal pain.   Genitourinary: Negative for dysuria.   Musculoskeletal: Positive for arthralgias (right hip). Negative for gait problem and joint swelling.   Skin: Negative for rash.   Neurological: Negative for speech difficulty.   Hematological: Does not bruise/bleed easily.   Psychiatric/Behavioral: Negative for confusion.       I have reviewed the medical and surgical history, family history, social history, medications, and/or allergies, and the review of systems of this report.    Objective   Temp 98 °F (36.7 °C)   Ht 172.7 cm (67.99\")   Wt 114 kg (251 lb)   BMI 38.17 kg/m²    Physical Exam  Vitals and nursing note reviewed.   Constitutional:       Appearance: Normal appearance.   Pulmonary:      Effort: Pulmonary effort is normal.   Musculoskeletal:      Lumbar back: Tenderness present.      Right hip: Tenderness present. No deformity, bony tenderness or crepitus. Normal range of motion.   Neurological:      Mental Status: He is alert and oriented to person, place, and time.       Ortho Exam   Extremity DVT signs are negative on physical exam with negative Gale sign, no calf pain, no palpable cords and no skin tone change   Neurologic Exam     Mental Status   Oriented to person, place, and time.       + ttp Greater trochanter bursa right hip  +TTP right SI joint          Assessment/Plan   Independent Review of Radiographic Studies:    AP and lateral of the right, indication to evaluate status of prosthesis and joint, and compared with prior imaging, shows no acute fracture or dislocation. Good position and alignment of prosthesis with no radiographic signs of loosening.       Large Joint Arthrocentesis: R greater trochanteric bursa  Date/Time: 12/28/2021 9:52 AM  Consent given by: patient  Site marked: site marked  Timeout: Immediately prior to procedure a time out was called to verify the correct patient, procedure, equipment, support staff and site/side " marked as required   Supporting Documentation  Indications: pain   Procedure Details  Location: hip - R greater trochanteric bursa  Preparation: Patient was prepped and draped in the usual sterile fashion  Needle size: 22 G  Approach: lateral  Medications administered: 2 mL lidocaine 1 %; 40 mg methylPREDNISolone acetate 40 MG/ML  Patient tolerance: patient tolerated the procedure well with no immediate complications             Diagnoses and all orders for this visit:    1. Arthralgia of right hip (Primary)  -     Large Joint Arthrocentesis: R greater trochanteric bursa    2. Status post total hip replacement, right  -     XR Hip With or Without Pelvis 2 - 3 View Right; Future  -     Large Joint Arthrocentesis: R greater trochanteric bursa    3. Greater trochanteric bursitis of right hip    4. Sacroiliac inflammation (HCC)       Orthopedic activities reviewed and patient expressed appreciation  Discussion of orthopedic goals  Risk, benefits, and merits of treatment alternatives reviewed with the patient and questions answered  Ice, heat, and/or modalities as beneficial    Recommendations/Plan:  Exercise, medications, injections, other patient advice, and return appointment as noted.  Patient is encouraged to call or return for any issues or concerns.    Use warm heat and ice modalities   patient agreeable to call or return sooner for any concerns.  If no improvement with the greater trochanter bursa injection he will need to follow-up with Dr. Daniels for possible LS spine eval             EMR Dragon-transcription disclaimer:  This encounter note is an electronic transcription of spoken language to printed text.  Electronic transcription of spoken language may permit erroneous or at times nonsensical words or phrases to be inadvertently transcribed.  Although I have reviewed the note for such errors, some may still exist

## 2022-04-05 ENCOUNTER — OFFICE VISIT (OUTPATIENT)
Dept: INTERNAL MEDICINE | Facility: CLINIC | Age: 77
End: 2022-04-05

## 2022-04-05 VITALS
DIASTOLIC BLOOD PRESSURE: 82 MMHG | TEMPERATURE: 98 F | SYSTOLIC BLOOD PRESSURE: 136 MMHG | HEIGHT: 68 IN | OXYGEN SATURATION: 98 % | BODY MASS INDEX: 37.59 KG/M2 | HEART RATE: 73 BPM | WEIGHT: 248 LBS

## 2022-04-05 DIAGNOSIS — M48.062 SPINAL STENOSIS OF LUMBAR REGION WITH NEUROGENIC CLAUDICATION: ICD-10-CM

## 2022-04-05 DIAGNOSIS — N40.0 BENIGN NON-NODULAR PROSTATIC HYPERPLASIA: ICD-10-CM

## 2022-04-05 DIAGNOSIS — E55.9 VITAMIN D DEFICIENCY: ICD-10-CM

## 2022-04-05 DIAGNOSIS — E03.9 HYPOTHYROIDISM, UNSPECIFIED TYPE: ICD-10-CM

## 2022-04-05 DIAGNOSIS — E11.9 TYPE 2 DIABETES MELLITUS WITHOUT COMPLICATION, WITHOUT LONG-TERM CURRENT USE OF INSULIN: ICD-10-CM

## 2022-04-05 DIAGNOSIS — K43.9 VENTRAL HERNIA WITHOUT OBSTRUCTION OR GANGRENE: ICD-10-CM

## 2022-04-05 DIAGNOSIS — M10.00 IDIOPATHIC GOUT, UNSPECIFIED CHRONICITY, UNSPECIFIED SITE: ICD-10-CM

## 2022-04-05 DIAGNOSIS — E78.5 HYPERLIPIDEMIA, UNSPECIFIED HYPERLIPIDEMIA TYPE: Primary | ICD-10-CM

## 2022-04-05 DIAGNOSIS — Z12.11 COLON CANCER SCREENING: ICD-10-CM

## 2022-04-05 DIAGNOSIS — R53.83 OTHER FATIGUE: ICD-10-CM

## 2022-04-05 DIAGNOSIS — Z00.00 WELLNESS EXAMINATION: ICD-10-CM

## 2022-04-05 DIAGNOSIS — I10 ESSENTIAL HYPERTENSION: ICD-10-CM

## 2022-04-05 PROBLEM — M25.551 ARTHRALGIA OF RIGHT HIP: Status: RESOLVED | Noted: 2021-03-30 | Resolved: 2022-04-05

## 2022-04-05 PROCEDURE — 1170F FXNL STATUS ASSESSED: CPT | Performed by: INTERNAL MEDICINE

## 2022-04-05 PROCEDURE — 1126F AMNT PAIN NOTED NONE PRSNT: CPT | Performed by: INTERNAL MEDICINE

## 2022-04-05 PROCEDURE — G0439 PPPS, SUBSEQ VISIT: HCPCS | Performed by: INTERNAL MEDICINE

## 2022-04-05 PROCEDURE — 1159F MED LIST DOCD IN RCRD: CPT | Performed by: INTERNAL MEDICINE

## 2022-04-05 PROCEDURE — 99397 PER PM REEVAL EST PAT 65+ YR: CPT | Performed by: INTERNAL MEDICINE

## 2022-04-05 PROCEDURE — 96160 PT-FOCUSED HLTH RISK ASSMT: CPT | Performed by: INTERNAL MEDICINE

## 2022-04-05 NOTE — PROGRESS NOTES
Subjective   John Leyva is a 76 y.o. male and is here for a comprehensive physical exam.     Do you take any herbs or supplements that were not prescribed by a doctor? no  Are you taking calcium supplements? no  Are you taking aspirin daily? no      The following portions of the patient's history were reviewed and updated as appropriate: allergies, current medications, past family history, past medical history, past social history, past surgical history and problem list.      Review of Systems   Constitutional: Negative.    HENT: Negative.    Eyes: Negative.    Respiratory: Negative.    Cardiovascular: Negative.    Gastrointestinal: Negative.    Endocrine: Negative.    Genitourinary: Negative.    Musculoskeletal: Negative.    Skin: Negative.    Allergic/Immunologic: Negative.    Neurological: Negative.    Hematological: Negative.    Psychiatric/Behavioral: Negative.    All other systems reviewed and are negative.        Physical Exam  Vitals and nursing note reviewed.   Constitutional:       Appearance: Normal appearance. He is well-developed. He is obese.   HENT:      Head: Normocephalic and atraumatic.      Right Ear: Tympanic membrane, ear canal and external ear normal.      Left Ear: Tympanic membrane, ear canal and external ear normal.      Nose: Nose normal.      Mouth/Throat:      Mouth: Mucous membranes are moist.      Pharynx: Oropharynx is clear.   Eyes:      Conjunctiva/sclera: Conjunctivae normal.      Pupils: Pupils are equal, round, and reactive to light.   Neck:      Thyroid: No thyromegaly.   Cardiovascular:      Rate and Rhythm: Normal rate and regular rhythm.      Heart sounds: Normal heart sounds.   Pulmonary:      Effort: Pulmonary effort is normal.      Breath sounds: Normal breath sounds.   Abdominal:      General: Bowel sounds are normal.      Palpations: Abdomen is soft.   Musculoskeletal:         General: Normal range of motion.      Cervical back: Normal range of motion and neck supple.    Skin:     General: Skin is warm and dry.   Neurological:      Mental Status: He is alert and oriented to person, place, and time.      Deep Tendon Reflexes: Reflexes are normal and symmetric.   Psychiatric:         Mood and Affect: Mood normal.         Behavior: Behavior normal.         Thought Content: Thought content normal.         Judgment: Judgment normal.         All  tests have been reviewed.    Assessment/Plan          1. Patient Counseling:  --Nutrition: Stressed importance of moderation in sodium/caffeine intake, saturated fat and cholesterol, caloric balance, sufficient intake of fresh fruits, vegetables, fiber, calcium and iron.  --Exercise: Stressed the importance of regular exercise.   --Injury prevention: Discussed safety belts, safety helmets, smoke detector, smoking near bedding or upholstery.   --Dental health: Discussed importance of regular tooth brushing, flossing, and dental visits.  --Immunizations reviewed.  --Discussed benefits of screening colonoscopy.  --After hours service discussed with patient    2. Discussed the patient's BMI with him.

## 2022-04-05 NOTE — PROGRESS NOTES
The ABCs of the Annual Wellness Visit  Subsequent Medicare Wellness Visit    Chief Complaint   Patient presents with   • Medicare Wellness-subsequent      Subjective    History of Present Illness:  John Leyva is a 76 y.o. male who presents for a Subsequent Medicare Wellness Visit.    The following portions of the patient's history were reviewed and   updated as appropriate: allergies, current medications, past family history, past medical history, past social history, past surgical history and problem list.    Compared to one year ago, the patient feels his physical   health is the same.    Compared to one year ago, the patient feels his mental   health is the same.    Recent Hospitalizations:  He was not admitted to the hospital during the last year.       Current Medical Providers:  Patient Care Team:  Mahesh Almanza MD as PCP - General (Internal Medicine)    Outpatient Medications Prior to Visit   Medication Sig Dispense Refill   • allopurinol (ZYLOPRIM) 100 MG tablet TAKE ONE TABLET BY MOUTH EVERY DAY 90 tablet 3   • amLODIPine (NORVASC) 5 MG tablet TAKE ONE TABLET BY MOUTH EVERY DAY 90 tablet 3   • atorvastatin (LIPITOR) 20 MG tablet TAKE ONE TABLET BY MOUTH EVERY DAY 90 tablet 3   • doxazosin (CARDURA) 2 MG tablet Take 2 mg by mouth every night at bedtime.     • DULoxetine (CYMBALTA) 30 MG capsule TAKE ONE CAPSULE BY MOUTH EVERY DAY 90 capsule 3   • lisinopril-hydrochlorothiazide (PRINZIDE,ZESTORETIC) 10-12.5 MG per tablet TAKE ONE TABLET BY MOUTH EVERY DAY. (MUST MAKE APPOINTMENT FOR REFILLS) 90 tablet 3   • metoprolol succinate XL (TOPROL-XL) 100 MG 24 hr tablet TAKE ONE TABLET BY MOUTH EVERY DAY 90 tablet 3   • Misc. Devices (Commode Bedside) misc Use as needed 1 each 0   • Multiple Vitamins-Minerals (MULTIVITAMIN ADULT PO) Take 1 tablet by mouth Daily.       No facility-administered medications prior to visit.       No opioid medication identified on active medication list. I have reviewed chart for  "other potential  high risk medication/s and harmful drug interactions in the elderly.          Aspirin is not on active medication list.  Aspirin use is not indicated based on review of current medical condition/s. Risk of harm outweighs potential benefits.  .    Patient Active Problem List   Diagnosis   • Benign non-nodular prostatic hyperplasia   • Vitamin D deficiency   • Type 2 diabetes mellitus without complication (HCC)   • Primary gout   • Hypothyroidism   • Hyperlipidemia   • Essential hypertension   • Other fatigue   • Spinal stenosis of lumbar region with neurogenic claudication   • Primary osteoarthritis of right hip   • Ventral hernia without obstruction or gangrene   • Status post total hip replacement, right     Advance Care Planning  Advance Directive is not on file.  ACP discussion was held with the patient during this visit. Patient does not have an advance directive, information provided.          Objective    Vitals:    22 0800   BP: 136/82   Pulse: 73   Temp: 98 °F (36.7 °C)   SpO2: 98%   Weight: 112 kg (248 lb)   Height: 172.7 cm (68\")   PainSc: 0-No pain     BMI Readings from Last 1 Encounters:   22 37.71 kg/m²   BMI is above normal parameters. Recommendations include: exercise counseling    Does the patient have evidence of cognitive impairment? No    Physical Exam            HEALTH RISK ASSESSMENT    Smoking Status:  Social History     Tobacco Use   Smoking Status Former Smoker   • Packs/day: 3.00   • Years: 10.00   • Pack years: 30.00   • Types: Cigarettes   • Quit date:    • Years since quittin.2   Smokeless Tobacco Never Used     Alcohol Consumption:  Social History     Substance and Sexual Activity   Alcohol Use Not Currently     Fall Risk Screen:    STEADI Fall Risk Assessment was completed, and patient is at MODERATE risk for falls. Assessment completed on:2022    Depression Screening:  PHQ-2/PHQ-9 Depression Screening 2022   Retired PHQ-9 Total Score - "   Retired Total Score -   Little Interest or Pleasure in Doing Things 0-->not at all   Feeling Down, Depressed or Hopeless 0-->not at all   PHQ-9: Brief Depression Severity Measure Score 0       Health Habits and Functional and Cognitive Screening:  Functional & Cognitive Status 4/5/2022   Do you have difficulty preparing food and eating? No   Do you have difficulty bathing yourself, getting dressed or grooming yourself? No   Do you have difficulty using the toilet? No   Do you have difficulty moving around from place to place? No   Do you have trouble with steps or getting out of a bed or a chair? No   Current Diet Well Balanced Diet   Dental Exam Not up to date   Eye Exam Up to date        Eye Exam Comment 2021   Exercise (times per week) 7 times per week   Current Exercises Include Walking   Current Exercise Activities Include -   Do you need help using the phone?  No   Are you deaf or do you have serious difficulty hearing?  No   Do you need help with transportation? No   Do you need help shopping? No   Do you need help preparing meals?  No   Do you need help with housework?  No   Do you need help with laundry? No   Do you need help taking your medications? No   Do you need help managing money? No   Do you ever drive or ride in a car without wearing a seat belt? No   Have you felt unusual stress, anger or loneliness in the last month? No   Who do you live with? Spouse   If you need help, do you have trouble finding someone available to you? No   Have you been bothered in the last four weeks by sexual problems? No   Do you have difficulty concentrating, remembering or making decisions? No       Age-appropriate Screening Schedule:  Refer to the list below for future screening recommendations based on patient's age, sex and/or medical conditions. Orders for these recommended tests are listed in the plan section. The patient has been provided with a written plan.    Health Maintenance   Topic Date Due   • ZOSTER  VACCINE (1 of 2) Never done   • DIABETIC EYE EXAM  10/16/2020   • DIABETIC FOOT EXAM  04/01/2022   • URINE MICROALBUMIN  04/01/2022   • HEMOGLOBIN A1C  04/04/2022   • INFLUENZA VACCINE  08/01/2022   • LIPID PANEL  10/04/2022   • TDAP/TD VACCINES (2 - Td or Tdap) 05/25/2027              Assessment/Plan   CMS Preventative Services Quick Reference  Risk Factors Identified During Encounter  Obesity/Overweight   The above risks/problems have been discussed with the patient.  Follow up actions/plans if indicated are seen below in the Assessment/Plan Section.  Pertinent information has been shared with the patient in the After Visit Summary.    Diagnoses and all orders for this visit:    1. Hyperlipidemia, unspecified hyperlipidemia type (Primary)    2. Essential hypertension    3. Vitamin D deficiency  -     Vitamin D 25 Hydroxy    4. Type 2 diabetes mellitus without complication, without long-term current use of insulin (HCC)  -     Hemoglobin A1c  -     MicroAlbumin, Urine, Random - Urine, Clean Catch    5. Hypothyroidism, unspecified type    6. Ventral hernia without obstruction or gangrene    7. Benign non-nodular prostatic hyperplasia    8. Idiopathic gout, unspecified chronicity, unspecified site    9. Spinal stenosis of lumbar region with neurogenic claudication    10. Other fatigue    11. Wellness examination  -     CBC & Differential  -     Comprehensive Metabolic Panel  -     Lipid Panel  -     TSH  -     PSA DIAGNOSTIC  -     CK    shingrix to pharmacy     Follow Up:   Return in about 6 months (around 10/5/2022) for Recheck.     An After Visit Summary and PPPS were made available to the patient.

## 2022-04-06 LAB
25(OH)D3+25(OH)D2 SERPL-MCNC: 38.9 NG/ML (ref 30–100)
ALBUMIN SERPL-MCNC: 4.2 G/DL (ref 3.5–5.2)
ALBUMIN/GLOB SERPL: 1.3 G/DL
ALP SERPL-CCNC: 115 U/L (ref 39–117)
ALT SERPL-CCNC: 18 U/L (ref 1–41)
AST SERPL-CCNC: 17 U/L (ref 1–40)
BASOPHILS # BLD AUTO: 0.06 10*3/MM3 (ref 0–0.2)
BASOPHILS NFR BLD AUTO: 0.7 % (ref 0–1.5)
BILIRUB SERPL-MCNC: 0.7 MG/DL (ref 0–1.2)
BUN SERPL-MCNC: 15 MG/DL (ref 8–23)
BUN/CREAT SERPL: 14 (ref 7–25)
CALCIUM SERPL-MCNC: 9.5 MG/DL (ref 8.6–10.5)
CHLORIDE SERPL-SCNC: 99 MMOL/L (ref 98–107)
CHOLEST SERPL-MCNC: 133 MG/DL (ref 0–200)
CK SERPL-CCNC: 204 U/L (ref 20–200)
CO2 SERPL-SCNC: 28.6 MMOL/L (ref 22–29)
CREAT SERPL-MCNC: 1.07 MG/DL (ref 0.76–1.27)
EGFRCR SERPLBLD CKD-EPI 2021: 72.8 ML/MIN/1.73
EOSINOPHIL # BLD AUTO: 0.22 10*3/MM3 (ref 0–0.4)
EOSINOPHIL NFR BLD AUTO: 2.7 % (ref 0.3–6.2)
ERYTHROCYTE [DISTWIDTH] IN BLOOD BY AUTOMATED COUNT: 13.8 % (ref 12.3–15.4)
GLOBULIN SER CALC-MCNC: 3.2 GM/DL
GLUCOSE SERPL-MCNC: 142 MG/DL (ref 65–99)
HBA1C MFR BLD: 6.9 % (ref 4.8–5.6)
HCT VFR BLD AUTO: 47.1 % (ref 37.5–51)
HDLC SERPL-MCNC: 36 MG/DL (ref 40–60)
HGB BLD-MCNC: 15.7 G/DL (ref 13–17.7)
IMM GRANULOCYTES # BLD AUTO: 0.02 10*3/MM3 (ref 0–0.05)
IMM GRANULOCYTES NFR BLD AUTO: 0.2 % (ref 0–0.5)
LDLC SERPL CALC-MCNC: 70 MG/DL (ref 0–100)
LYMPHOCYTES # BLD AUTO: 2.65 10*3/MM3 (ref 0.7–3.1)
LYMPHOCYTES NFR BLD AUTO: 32.4 % (ref 19.6–45.3)
MCH RBC QN AUTO: 30.4 PG (ref 26.6–33)
MCHC RBC AUTO-ENTMCNC: 33.3 G/DL (ref 31.5–35.7)
MCV RBC AUTO: 91.1 FL (ref 79–97)
MICROALBUMIN UR-MCNC: 6.8 UG/ML
MONOCYTES # BLD AUTO: 0.5 10*3/MM3 (ref 0.1–0.9)
MONOCYTES NFR BLD AUTO: 6.1 % (ref 5–12)
NEUTROPHILS # BLD AUTO: 4.73 10*3/MM3 (ref 1.7–7)
NEUTROPHILS NFR BLD AUTO: 57.9 % (ref 42.7–76)
NRBC BLD AUTO-RTO: 0 /100 WBC (ref 0–0.2)
PLATELET # BLD AUTO: 277 10*3/MM3 (ref 140–450)
POTASSIUM SERPL-SCNC: 4.6 MMOL/L (ref 3.5–5.2)
PROT SERPL-MCNC: 7.4 G/DL (ref 6–8.5)
PSA SERPL-MCNC: 2.77 NG/ML (ref 0–4)
RBC # BLD AUTO: 5.17 10*6/MM3 (ref 4.14–5.8)
SODIUM SERPL-SCNC: 136 MMOL/L (ref 136–145)
TRIGL SERPL-MCNC: 159 MG/DL (ref 0–150)
TSH SERPL DL<=0.005 MIU/L-ACNC: 2.56 UIU/ML (ref 0.27–4.2)
VLDLC SERPL CALC-MCNC: 27 MG/DL (ref 5–40)
WBC # BLD AUTO: 8.18 10*3/MM3 (ref 3.4–10.8)

## 2022-04-11 ENCOUNTER — OFFICE VISIT (OUTPATIENT)
Dept: ORTHOPEDIC SURGERY | Facility: CLINIC | Age: 77
End: 2022-04-11

## 2022-04-11 VITALS — HEIGHT: 68 IN | BODY MASS INDEX: 37.62 KG/M2 | WEIGHT: 248.2 LBS | TEMPERATURE: 98.2 F

## 2022-04-11 DIAGNOSIS — Z96.641 STATUS POST TOTAL HIP REPLACEMENT, RIGHT: Primary | ICD-10-CM

## 2022-04-11 PROCEDURE — 73502 X-RAY EXAM HIP UNI 2-3 VIEWS: CPT | Performed by: ORTHOPAEDIC SURGERY

## 2022-04-11 PROCEDURE — 99213 OFFICE O/P EST LOW 20 MIN: CPT | Performed by: ORTHOPAEDIC SURGERY

## 2022-04-11 NOTE — PROGRESS NOTES
Subjective   Patient ID: John Leyva is a 76 y.o. right hand dominant male is here today for orthopaedic evaluation of recovery progress with treatment.  Annual Exam of the Right Hip (S/P right total hip arthroplasty on 4/13/21.)       CHIEF COMPLAINT:     Right hip annual visit after total hip replacement surgery.    History of Present Illness     Progress Note:  Patient reports that with treatments and since the last visit, overall rigth hip pain is slight or resolved,  mobility is improved and strength is improved.  The patient is not currently taking pain medication .   Physical therapy has been performed post-operatively up until December 2021 and was effective. Injection therapy has  been helpful. He received a hip bursa injection in December, 2021 and states he has not had pain since. Since last visit, patient has been working on his farm, throwing hay rajendra, etc. He states he has hired someone to mow for him, and he has horses but has not ridden them to avoid placing too much stress on his hip.  Patient specifically states he wants to thank our team for his good right hip pain relief and hip replacement recovery.       Past Medical History:   Diagnosis Date   • Arthritis    • BPH (benign prostatic hyperplasia)    • Cataract, bilateral     s/p surgery   • Diabetes mellitus (HCC)     no medications    • ED (erectile dysfunction)    • Elevated cholesterol    • Elevated PSA    • Gout    • Hypertension    • Hypogonadism in male    • Hypothyroidism     no medications   • Impaired functional mobility, balance, gait, and endurance    • Low kidney function    • Wears glasses         Past Surgical History:   Procedure Laterality Date   • APPENDECTOMY     • COLONOSCOPY     • ELBOW OLECRANNON BURSA EXCISION Right    • EYE SURGERY Bilateral     cataracts removed   • LUMBAR LAMINECTOMY DISCECTOMY DECOMPRESSION Right 6/4/2020    Procedure: LUMBAR LAMINECTOMY L4-5, HEMILAMINECTOMY L3-4 RIGHT;  Surgeon: Paulie Daniels,  "MD;  Location: [facility] OR;  Service: Neurosurgery;  Laterality: Right;   • RETINAL DETACHMENT SURGERY Right     x3 surgeries   • SKIN BIOPSY      benign   • TOTAL HIP ARTHROPLASTY Right 2021    Procedure: Elective total hip arthroplasty;  Surgeon: Jose Carlson MD;  Location: [facility];  Service: Orthopedics;  Laterality: Right;        Family History   Problem Relation Age of Onset   • Cancer Other    • Cancer Mother    • Cancer Father         Bone cancer        Social History     Socioeconomic History   • Marital status:      Spouse name: Yolanda   Tobacco Use   • Smoking status: Former Smoker     Packs/day: 3.00     Years: 10.00     Pack years: 30.00     Types: Cigarettes     Quit date:      Years since quittin.3   • Smokeless tobacco: Never Used   Vaping Use   • Vaping Use: Never used   Substance and Sexual Activity   • Alcohol use: Not Currently   • Drug use: No   • Sexual activity: Defer       No Known Allergies    Review of Systems   Constitutional: Negative for fever.   Musculoskeletal: Negative for arthralgias, gait problem and joint swelling.   Skin: Negative for color change and rash.   Neurological: Negative for weakness.   Psychiatric/Behavioral: Negative for confusion.       I have reviewed the medical and surgical history, family history, social history, medications, and/or allergies, and the review of systems of this report.    Objective   Temp 98.2 °F (36.8 °C)   Ht 172.7 cm (68\")   Wt 113 kg (248 lb 3.2 oz)   BMI 37.74 kg/m²   Physical Exam  Vitals reviewed.   Constitutional:       General: He is not in acute distress.     Appearance: He is well-developed.   Skin:     General: Skin is warm and dry.      Findings: No erythema or rash.   Neurological:      Mental Status: He is alert.      Gait: Gait is intact.   Psychiatric:         Speech: Speech normal.       Right Hip Exam     Range of Motion   Abduction: 40   Flexion: 110   External rotation: 30   Internal rotation: 20 "     Muscle Strength   Adduction: 5/5   Flexion: 5/5     Tests   JANY: negative  Fadir:  Negative FADIR test    Other   Erythema: absent  Scars: present (well healed)  Pulse: present        Extremity DVT signs are negative on physical exam with negative Gale sign, no calf pain, no palpable cords and no skin tone change   Neurologic Exam     Mental Status   Attention: normal.   Speech: speech is normal   Level of consciousness: alert  Knowledge: good.     Motor Exam   Overall muscle tone: normal    Gait, Coordination, and Reflexes     Gait  Gait: normal (No Trendelenburg sign. )      Assessment/Plan     Independent Review of Radiographic Studies:    AP and lateral of the right hip, indication to evaluate status of prosthesis and joint, and compared with prior imaging, shows no acute fracture or dislocation. Good position and alignment of prosthesis with no radiographic signs of loosening.     Laboratory and Other Studies:  No new results reviewed today.     Medical Decision Making:    No complications of procedure and treatments.  Excellent progress.  Routine activity, work and exercise as tolerated.  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  He is avoiding over stressful activities such as mowing and horseback riding.    Procedures    Diagnoses and all orders for this visit:    1. Status post total hip replacement, right (Primary)  -     XR Hip With or Without Pelvis 2 - 3 View Right; Future       Discussion of orthopaedic goals and activities and patient and/or guardian expressed appreciation.  Regular exercise as tolerated  Guided on proper techniques for mobility, strength, agility and/or conditioning exercises  Home exercise program ongoing  After care and dental prophylaxis for joint replacement prosthesis  Hip replacement precautions reviewed again.    Recommendations/Plan:  Exercise, medications, injections, other patient  advice, and return appointment as noted.  Brace: No brace was given at today's visit.  Referral: No referrals made at today's visit.  Test/Studies: No additional studies ordered at this time.  Surgery: No surgery proposed at this visit.  Work/Activity Status: Usual activities, routine exercise as tolerated, routine physical work as tolerated.    Return in about 2 years (around 4/11/2024) for Annual recheck, XOA right hip.  Patient is encouraged and agreeable to call or return sooner for any issues or concerns.      Portions of this note have been scribed for Jose Carlson MD by Susanna Clifton CMA

## 2022-04-28 ENCOUNTER — OFFICE VISIT (OUTPATIENT)
Dept: UROLOGY | Facility: CLINIC | Age: 77
End: 2022-04-28

## 2022-04-28 VITALS
HEART RATE: 65 BPM | HEIGHT: 68 IN | DIASTOLIC BLOOD PRESSURE: 80 MMHG | BODY MASS INDEX: 37.59 KG/M2 | SYSTOLIC BLOOD PRESSURE: 144 MMHG | TEMPERATURE: 97.5 F | WEIGHT: 248 LBS | OXYGEN SATURATION: 95 %

## 2022-04-28 DIAGNOSIS — N40.1 BPH WITH OBSTRUCTION/LOWER URINARY TRACT SYMPTOMS: Primary | ICD-10-CM

## 2022-04-28 DIAGNOSIS — N13.8 BPH WITH OBSTRUCTION/LOWER URINARY TRACT SYMPTOMS: Primary | ICD-10-CM

## 2022-04-28 LAB
BILIRUB BLD-MCNC: NEGATIVE MG/DL
CLARITY, POC: CLEAR
COLOR UR: YELLOW
EXPIRATION DATE: NORMAL
GLUCOSE UR STRIP-MCNC: NEGATIVE MG/DL
KETONES UR QL: NEGATIVE
LEUKOCYTE EST, POC: NEGATIVE
Lab: NORMAL
NITRITE UR-MCNC: NEGATIVE MG/ML
PH UR: 6 [PH] (ref 5–8)
PROT UR STRIP-MCNC: NEGATIVE MG/DL
RBC # UR STRIP: NEGATIVE /UL
SP GR UR: 1.02 (ref 1–1.03)
UROBILINOGEN UR QL: NORMAL

## 2022-04-28 PROCEDURE — 81003 URINALYSIS AUTO W/O SCOPE: CPT | Performed by: PHYSICIAN ASSISTANT

## 2022-04-28 PROCEDURE — 99213 OFFICE O/P EST LOW 20 MIN: CPT | Performed by: PHYSICIAN ASSISTANT

## 2022-04-28 NOTE — PROGRESS NOTES
Chief Complaint   Patient presents with   • Follow-up        HPI  Mr. Leyva is a 76 y.o. male with history of BPH with LUTS who presents for follow up.     At this visit, states he has not noticed a progression in his symptoms since stopping finasteride about 8 months ago.  He denies dysuria, or hematuria.  He continues to take doxazosin and is happy with his medication.    IPSS Questionnaire (AUA-7):  Over the past month…    1)  Incomplete Emptying  How often have you had a sensation of not emptying your bladder?  1 - Less than 1 time in 5   2)  Frequency  How often have you had to urinate less than every two hours? 2 - Less than half the time   3)  Intermittency  How often have you found you stopped and started again several times when you urinated?  2 - Less than half the time   4) Urgency  How often have you found it difficult to postpone urination?  1 - Less than 1 time in 5   5) Weak Stream  How often have you had a weak urinary stream?  1 - Less than 1 time in 5   6) Straining  How often have you had to push or strain to begin urination?  0 - Not at all   7) Nocturia  How many times did you typically get up at night to urinate?  2 - 2 times   Total Score:  9       Quality of life due to urinary symptoms:  If you were to spend the rest of your life with your urinary condition the way it is now, how would you feel about that? 1-Pleased   Urine Leakage (Incontinence) 0-No Leakage       Past Medical History:   Diagnosis Date   • Arthritis    • BPH (benign prostatic hyperplasia)    • Cataract, bilateral     s/p surgery   • Diabetes mellitus (HCC)     no medications    • ED (erectile dysfunction)    • Elevated cholesterol    • Elevated PSA    • Gout    • Hypertension    • Hypogonadism in male    • Hypothyroidism     no medications   • Impaired functional mobility, balance, gait, and endurance    • Low kidney function    • Wears glasses        Past Surgical History:   Procedure Laterality Date   • APPENDECTOMY     •  "COLONOSCOPY     • ELBOW OLECRANNON BURSA EXCISION Right    • EYE SURGERY Bilateral     cataracts removed   • LUMBAR LAMINECTOMY DISCECTOMY DECOMPRESSION Right 6/4/2020    Procedure: LUMBAR LAMINECTOMY L4-5, HEMILAMINECTOMY L3-4 RIGHT;  Surgeon: Paulie Daniels MD;  [facility]: [facility];  Service: Neurosurgery;  Laterality: Right;   • RETINAL DETACHMENT SURGERY Right     x3 surgeries   • SKIN BIOPSY      benign   • TOTAL HIP ARTHROPLASTY Right 4/13/2021    Procedure: Elective total hip arthroplasty;  Surgeon: Jose Carlson MD;  Location: [facility];  Service: Orthopedics;  Laterality: Right;         Current Outpatient Medications:   •  allopurinol (ZYLOPRIM) 100 MG tablet, TAKE ONE TABLET BY MOUTH EVERY DAY, Disp: 90 tablet, Rfl: 3  •  amLODIPine (NORVASC) 5 MG tablet, TAKE ONE TABLET BY MOUTH EVERY DAY, Disp: 90 tablet, Rfl: 3  •  atorvastatin (LIPITOR) 20 MG tablet, TAKE ONE TABLET BY MOUTH EVERY DAY, Disp: 90 tablet, Rfl: 3  •  doxazosin (CARDURA) 2 MG tablet, Take 2 mg by mouth every night at bedtime., Disp: , Rfl:   •  DULoxetine (CYMBALTA) 30 MG capsule, TAKE ONE CAPSULE BY MOUTH EVERY DAY, Disp: 90 capsule, Rfl: 3  •  lisinopril-hydrochlorothiazide (PRINZIDE,ZESTORETIC) 10-12.5 MG per tablet, TAKE ONE TABLET BY MOUTH EVERY DAY. (MUST MAKE APPOINTMENT FOR REFILLS), Disp: 90 tablet, Rfl: 3  •  metoprolol succinate XL (TOPROL-XL) 100 MG 24 hr tablet, TAKE ONE TABLET BY MOUTH EVERY DAY, Disp: 90 tablet, Rfl: 3  •  Misc. Devices (Commode Bedside) misc, Use as needed, Disp: 1 each, Rfl: 0  •  Multiple Vitamins-Minerals (MULTIVITAMIN ADULT PO), Take 1 tablet by mouth Daily., Disp: , Rfl:      Physical Exam  Visit Vitals  /80   Pulse 65   Temp 97.5 °F (36.4 °C)   Ht 172.7 cm (68\")   Wt 112 kg (248 lb)   SpO2 95%   BMI 37.71 kg/m²       Labs  Brief Urine Lab Results  (Last result in the past 365 days)      Color   Clarity   Blood   Leuk Est   Nitrite   Protein   CREAT   Urine HCG        04/28/22 0908 " Yellow   Clear   Negative   Negative   Negative   Negative                 Lab Results   Component Value Date    GLUCOSE 142 (H) 04/05/2022    CALCIUM 9.5 04/05/2022     04/05/2022    K 4.6 04/05/2022    CO2 28.6 04/05/2022    CL 99 04/05/2022    BUN 15 04/05/2022    CREATININE 1.07 04/05/2022    EGFRIFAFRI 79 10/04/2021    EGFRIFNONA 65 10/04/2021    BCR 14.0 04/05/2022    ANIONGAP 8.9 04/14/2021       Lab Results   Component Value Date    WBC 8.18 04/05/2022    HGB 15.7 04/05/2022    HCT 47.1 04/05/2022    MCV 91.1 04/05/2022     04/05/2022            Lab Results   Component Value Date    PSA 2.770 04/05/2022    PSA 1.130 10/13/2020    PSA 2.240 09/23/2019       Lab Results   Component Value Date    TESTOSTERONE 469.5 06/13/2016          Assessment  76 y.o. male with BPH presenting for follow-up.  His IPSS has actually improved quite a bit and is only 9.  His urine is benign.  His most recent PSA was within normal limits, and slightly elevated as compared with prior.  He stopped finasteride over 6 months ago and an increase in PSA is to be expected.  As his IPSS is low, he is pleased with doxazosin for his LUTS, he is low risk for prostate cancer and outside of the AUA recommended guidelines by age for further screening, I have offered to the patient to release him back to primary care for management.  He is agreeable with this and will follow up with his primary care, return to our office as needed.    Plan  1.  Follow-up as needed

## 2022-04-29 RX ORDER — METOPROLOL SUCCINATE 100 MG/1
TABLET, EXTENDED RELEASE ORAL
Qty: 90 TABLET | Refills: 3 | Status: SHIPPED | OUTPATIENT
Start: 2022-04-29 | End: 2022-10-05 | Stop reason: SDUPTHER

## 2022-04-29 RX ORDER — DULOXETIN HYDROCHLORIDE 30 MG/1
CAPSULE, DELAYED RELEASE ORAL
Qty: 90 CAPSULE | Refills: 3 | Status: SHIPPED | OUTPATIENT
Start: 2022-04-29

## 2022-04-29 NOTE — TELEPHONE ENCOUNTER
Rx Refill Note  Requested Prescriptions     Pending Prescriptions Disp Refills   • metoprolol succinate XL (TOPROL-XL) 100 MG 24 hr tablet [Pharmacy Med Name: metoprolol succinate  mg tablet,extended release 24 hr] 90 tablet 3     Sig: TAKE ONE TABLET BY MOUTH EVERY DAY   • DULoxetine (CYMBALTA) 30 MG capsule [Pharmacy Med Name: duloxetine 30 mg capsule,delayed release] 90 capsule 3     Sig: TAKE ONE CAPSULE BY MOUTH EVERY DAY      Last office visit with prescribing clinician: 4/5/2022      Next office visit with prescribing clinician: 10/5/2022            KERRY VALE MA  04/29/22, 08:23 EDT

## 2022-05-15 DIAGNOSIS — I10 ESSENTIAL HYPERTENSION: ICD-10-CM

## 2022-05-16 RX ORDER — AMLODIPINE BESYLATE 5 MG/1
TABLET ORAL
Qty: 90 TABLET | Refills: 3 | Status: SHIPPED | OUTPATIENT
Start: 2022-05-16

## 2022-05-16 NOTE — TELEPHONE ENCOUNTER
Rx Refill Note  Requested Prescriptions     Pending Prescriptions Disp Refills   • amLODIPine (NORVASC) 5 MG tablet [Pharmacy Med Name: amlodipine 5 mg tablet] 90 tablet 3     Sig: TAKE ONE TABLET BY MOUTH EVERY DAY      Last office visit with prescribing clinician: 4/5/2022      Next office visit with prescribing clinician: 10/5/2022            Thuy Borjas MA  05/16/22, 15:04 EDT

## 2022-05-24 ENCOUNTER — TELEPHONE (OUTPATIENT)
Dept: SURGERY | Facility: CLINIC | Age: 77
End: 2022-05-24

## 2022-05-24 NOTE — TELEPHONE ENCOUNTER
PT RETURNED CALL AND DECLINED 7 YEAR COLONOSCOPY WITH DR. YATES HE IS BEING TREATED BY [facility] GASTROENTEROLOGY.

## 2022-05-26 RX ORDER — DOXAZOSIN 2 MG/1
TABLET ORAL
Qty: 90 TABLET | Refills: 3 | Status: SHIPPED | OUTPATIENT
Start: 2022-05-26

## 2022-05-26 NOTE — TELEPHONE ENCOUNTER
Rx Refill Note  Requested Prescriptions     Pending Prescriptions Disp Refills   • doxazosin (CARDURA) 2 MG tablet [Pharmacy Med Name: doxazosin 2 mg tablet] 90 tablet 3     Sig: TAKE ONE TABLET BY MOUTH AT BEDTIME      Last office visit with prescribing clinician: 4/5/2022      Next office visit with prescribing clinician: 10/5/2022            WILMAN YUAN MA  05/26/22, 08:02 EDT     Historical provider are you willing to refill this?

## 2022-06-02 RX ORDER — LISINOPRIL AND HYDROCHLOROTHIAZIDE 12.5; 1 MG/1; MG/1
TABLET ORAL
Qty: 90 TABLET | Refills: 0 | Status: SHIPPED | OUTPATIENT
Start: 2022-06-02 | End: 2022-09-15

## 2022-06-02 NOTE — TELEPHONE ENCOUNTER
Rx Refill Note  Requested Prescriptions     Pending Prescriptions Disp Refills   • lisinopril-hydrochlorothiazide (PRINZIDE,ZESTORETIC) 10-12.5 MG per tablet [Pharmacy Med Name: lisinopril 10 mg-hydrochlorothiazide 12.5 mg tablet] 90 tablet 3     Sig: TAKE ONE TABLET BY MOUTH EVERY DAY (MUST MAKE APPOINTMENT FOR REFILLS)      Last office visit with prescribing clinician: 4/5/2022      Next office visit with prescribing clinician: 10/5/2022            KERRY VALE MA  06/02/22, 08:30 EDT

## 2022-07-25 ENCOUNTER — OFFICE VISIT (OUTPATIENT)
Dept: GASTROENTEROLOGY | Facility: CLINIC | Age: 77
End: 2022-07-25

## 2022-07-25 VITALS
SYSTOLIC BLOOD PRESSURE: 132 MMHG | HEIGHT: 68 IN | BODY MASS INDEX: 37.59 KG/M2 | DIASTOLIC BLOOD PRESSURE: 70 MMHG | WEIGHT: 248 LBS | TEMPERATURE: 97.8 F

## 2022-07-25 DIAGNOSIS — Z12.11 ENCOUNTER FOR SCREENING FOR MALIGNANT NEOPLASM OF COLON: Primary | ICD-10-CM

## 2022-07-25 DIAGNOSIS — E11.9 TYPE 2 DIABETES MELLITUS WITHOUT COMPLICATION, WITHOUT LONG-TERM CURRENT USE OF INSULIN: ICD-10-CM

## 2022-07-25 PROCEDURE — S0260 H&P FOR SURGERY: HCPCS | Performed by: NURSE PRACTITIONER

## 2022-07-25 RX ORDER — SODIUM CHLORIDE 9 MG/ML
70 INJECTION, SOLUTION INTRAVENOUS CONTINUOUS PRN
Status: CANCELLED | OUTPATIENT
Start: 2022-07-25

## 2022-07-25 RX ORDER — CHOLECALCIFEROL (VITAMIN D3) 125 MCG
2000 CAPSULE ORAL DAILY
COMMUNITY

## 2022-07-25 RX ORDER — SODIUM, POTASSIUM,MAG SULFATES 17.5-3.13G
SOLUTION, RECONSTITUTED, ORAL ORAL
Qty: 177 ML | Refills: 0 | Status: SHIPPED | OUTPATIENT
Start: 2022-07-25 | End: 2022-10-05

## 2022-07-25 NOTE — PROGRESS NOTES
New Patient Consult      Date: 2022   Patient Name: John Leyva  MRN: [id number]  : 1945     Primary Care Provider: Mahesh Almanza MD    Chief Complaint   Patient presents with   • Colon Cancer Screening     History of Present Illness: John Leyva is a 77 y.o. male who is here today to establish care with gastroenterology for colon cancer screening.     The patient denies recent change in bowel habits. There is no diarrhea or constipation. There is no history of abdominal pain. There is no history of overt GI bleed (hematemesis melena or hematochezia). The patient denies nausea or vomiting. There is no history of reflux. The patient denies dysphagia or odynophagia. There is no history of recent significant weight loss. There is no history of liver disease in the past. There is no family history of GI malignancy. The patient's last colonoscopy was over 5 years ago by Dr. Yen and was normal per patient.     Subjective      Past Medical History:   Diagnosis Date   • Arthritis    • BPH (benign prostatic hyperplasia)    • Cataract, bilateral     s/p surgery   • Diabetes mellitus (HCC)     no medications    • ED (erectile dysfunction)    • Elevated cholesterol    • Elevated PSA    • Gout    • Hypertension    • Hypogonadism in male    • Hypothyroidism     no medications   • Impaired functional mobility, balance, gait, and endurance    • Low kidney function    • Retinal detachment    • Wears glasses      Past Surgical History:   Procedure Laterality Date   • APPENDECTOMY     • COLONOSCOPY      over 5 years ago  Dr Yen   • ELBOW OLECRANNON BURSA EXCISION Right    • EYE SURGERY Bilateral     cataracts removed   • LUMBAR LAMINECTOMY DISCECTOMY DECOMPRESSION Right 2020    Procedure: LUMBAR LAMINECTOMY L4-5, HEMILAMINECTOMY L3-4 RIGHT;  Surgeon: Paulie Daniels MD;  Location: [facility];  Service: Neurosurgery;  Laterality: Right;   • RETINAL DETACHMENT SURGERY Right     x3 surgeries   • SKIN  BIOPSY      benign   • TOTAL HIP ARTHROPLASTY Right 2021    Procedure: Elective total hip arthroplasty;  Surgeon: Jose Carlson MD;  Location: [facility];  Service: Orthopedics;  Laterality: Right;     Family History   Problem Relation Age of Onset   • Cancer Mother    • Cancer Father         Bone cancer   • Cancer Other    • Colon cancer Neg Hx    • Liver cancer Neg Hx    • Rectal cancer Neg Hx    • Stomach cancer Neg Hx      Social History     Socioeconomic History   • Marital status:      Spouse name: Yolanda   Tobacco Use   • Smoking status: Former Smoker     Packs/day: 3.00     Years: 10.00     Pack years: 30.00     Types: Cigarettes     Quit date:      Years since quittin.5   • Smokeless tobacco: Never Used   Vaping Use   • Vaping Use: Never used   Substance and Sexual Activity   • Alcohol use: Not Currently   • Drug use: No   • Sexual activity: Defer       Current Outpatient Medications:   •  allopurinol (ZYLOPRIM) 100 MG tablet, TAKE ONE TABLET BY MOUTH EVERY DAY, Disp: 90 tablet, Rfl: 3  •  amLODIPine (NORVASC) 5 MG tablet, TAKE ONE TABLET BY MOUTH EVERY DAY, Disp: 90 tablet, Rfl: 3  •  atorvastatin (LIPITOR) 20 MG tablet, TAKE ONE TABLET BY MOUTH EVERY DAY, Disp: 90 tablet, Rfl: 3  •  Cholecalciferol (Vitamin D3) 50 MCG (2000 UT) tablet, Take 2,000 Units by mouth Daily., Disp: , Rfl:   •  doxazosin (CARDURA) 2 MG tablet, TAKE ONE TABLET BY MOUTH AT BEDTIME, Disp: 90 tablet, Rfl: 3  •  DULoxetine (CYMBALTA) 30 MG capsule, TAKE ONE CAPSULE BY MOUTH EVERY DAY, Disp: 90 capsule, Rfl: 3  •  lisinopril-hydrochlorothiazide (PRINZIDE,ZESTORETIC) 10-12.5 MG per tablet, TAKE ONE TABLET BY MOUTH EVERY DAY (MUST MAKE APPOINTMENT FOR REFILLS), Disp: 90 tablet, Rfl: 0  •  metoprolol succinate XL (TOPROL-XL) 100 MG 24 hr tablet, TAKE ONE TABLET BY MOUTH EVERY DAY, Disp: 90 tablet, Rfl: 3  •  Multiple Vitamins-Minerals (MULTIVITAMIN ADULT PO), Take 1 tablet by mouth Daily., Disp: , Rfl:   •  Misc.  "Devices (Commode Bedside) misc, Use as needed, Disp: 1 each, Rfl: 0  •  sodium-potassium-magnesium sulfates (Suprep Bowel Prep Kit) 17.5-3.13-1.6 GM/177ML solution oral solution, Use as directed for colonoscopy prep. Patient has instructions., Disp: 177 mL, Rfl: 0     No Known Allergies     The following portions of the patient's history were reviewed and updated as appropriate: allergies, current medications, past family history, past medical history, past social history, past surgical history and problem list.    Objective     Physical Exam  Vitals and nursing note reviewed.   Constitutional:       General: He is not in acute distress.     Appearance: Normal appearance. He is well-developed.   HENT:      Head: Normocephalic and atraumatic.      Mouth/Throat:      Mouth: Mucous membranes are not pale, not dry and not cyanotic.   Eyes:      General: Lids are normal.   Neck:      Trachea: Trachea normal.   Cardiovascular:      Rate and Rhythm: Normal rate.   Pulmonary:      Effort: Pulmonary effort is normal. No respiratory distress.      Breath sounds: Normal breath sounds.   Abdominal:      Tenderness: There is no abdominal tenderness.   Skin:     General: Skin is warm and dry.   Neurological:      Mental Status: He is alert and oriented to person, place, and time.   Psychiatric:         Mood and Affect: Mood normal.         Speech: Speech normal.         Behavior: Behavior normal. Behavior is cooperative.       Vitals:    07/25/22 1001   BP: 132/70   Temp: 97.8 °F (36.6 °C)   Weight: 112 kg (248 lb)   Height: 172.7 cm (68\")     Body mass index is 37.71 kg/m².     Results Review:   I have reviewed the patient's new clinical and imaging results.    Office Visit on 04/28/2022   Component Date Value Ref Range Status   • Color 04/28/2022 Yellow  Yellow, Straw, Dark Yellow, María Final   • Clarity, UA 04/28/2022 Clear  Clear Final   • Specific Gravity  04/28/2022 1.020  1.005 - 1.030 Final   • pH, Urine 04/28/2022 6.0  " 5.0 - 8.0 Final   • Leukocytes 04/28/2022 Negative  Negative Final   • Nitrite, UA 04/28/2022 Negative  Negative Final   • Protein, POC 04/28/2022 Negative  Negative mg/dL Final   • Glucose, UA 04/28/2022 Negative  Negative, 1000 mg/dL (3+) mg/dL Final   • Ketones, UA 04/28/2022 Negative  Negative Final   • Urobilinogen, UA 04/28/2022 Normal  Normal Final   • Bilirubin 04/28/2022 Negative  Negative Final   • Blood, UA 04/28/2022 Negative  Negative Final   • Lot Number 04/28/2022 [id number]   Final   • Expiration Date 04/28/2022 [id number]   Final      No radiology results for the last 90 days.     Assessment / Plan      1. Encounter for screening for malignant neoplasm of colon  His last colonoscopy was >5 years ago by Dr. Yen and was normal per patient. Results unavailable. No family history of colon cancer.   Colonoscopy for screening.     - Case Request; Standing  - Case Request  - sodium-potassium-magnesium sulfates (Suprep Bowel Prep Kit) 17.5-3.13-1.6 GM/177ML solution oral solution; Use as directed for colonoscopy prep. Patient has instructions.  Dispense: 177 mL; Refill: 0    2. Type 2 diabetes mellitus without complication, without long-term current use of insulin ([facility])    Patient Instructions   1. Colonoscopy: The indications, technique, alternatives and potential risk and complications were discussed with the patient including but not limited to bleeding, perforations, missing lesions and anesthetic complications. The patient understands and wishes to proceed with the procedure and has given their verbal consent. Written patient education information was given to the patient.   2. The patient will call if they have further questions before procedure.       Anton Espinal, APRN  7/25/2022    Please note that portions of this note may have been completed with a voice recognition program. Efforts were made to edit the dictations, but occasionally words are mistranscribed.

## 2022-07-26 PROBLEM — Z12.11 ENCOUNTER FOR SCREENING FOR MALIGNANT NEOPLASM OF COLON: Status: ACTIVE | Noted: 2022-07-26

## 2022-09-15 RX ORDER — LISINOPRIL AND HYDROCHLOROTHIAZIDE 12.5; 1 MG/1; MG/1
1 TABLET ORAL DAILY
Qty: 90 TABLET | Refills: 0 | Status: SHIPPED | OUTPATIENT
Start: 2022-09-15 | End: 2022-12-16

## 2022-09-15 NOTE — TELEPHONE ENCOUNTER
Rx Refill Note  Requested Prescriptions     Pending Prescriptions Disp Refills   • lisinopril-hydrochlorothiazide (PRINZIDE,ZESTORETIC) 10-12.5 MG per tablet [Pharmacy Med Name: lisinopril 10 mg-hydrochlorothiazide 12.5 mg tablet] 90 tablet 0     Sig: TAKE ONE TABLET BY MOUTH EVERY DAY (must make appointment FOR refills)      Last office visit with prescribing clinician: 4/5/2022      Next office visit with prescribing clinician: 10/5/2022            Thuy Borjas MA  09/15/22, 11:56 EDT

## 2022-10-05 ENCOUNTER — OFFICE VISIT (OUTPATIENT)
Dept: INTERNAL MEDICINE | Facility: CLINIC | Age: 77
End: 2022-10-05

## 2022-10-05 VITALS
HEIGHT: 68 IN | SYSTOLIC BLOOD PRESSURE: 122 MMHG | HEART RATE: 64 BPM | TEMPERATURE: 96.9 F | BODY MASS INDEX: 36.37 KG/M2 | WEIGHT: 240 LBS | DIASTOLIC BLOOD PRESSURE: 80 MMHG | OXYGEN SATURATION: 96 %

## 2022-10-05 DIAGNOSIS — R53.83 OTHER FATIGUE: ICD-10-CM

## 2022-10-05 DIAGNOSIS — I10 ESSENTIAL HYPERTENSION: ICD-10-CM

## 2022-10-05 DIAGNOSIS — E11.9 TYPE 2 DIABETES MELLITUS WITHOUT COMPLICATION, WITHOUT LONG-TERM CURRENT USE OF INSULIN: ICD-10-CM

## 2022-10-05 DIAGNOSIS — E55.9 VITAMIN D DEFICIENCY, UNSPECIFIED: ICD-10-CM

## 2022-10-05 DIAGNOSIS — E78.5 HYPERLIPIDEMIA, UNSPECIFIED HYPERLIPIDEMIA TYPE: Primary | ICD-10-CM

## 2022-10-05 PROCEDURE — 0124A COVID-19 (PFIZER) BIVALENT BOOSTER 12+YRS: CPT | Performed by: INTERNAL MEDICINE

## 2022-10-05 PROCEDURE — 99214 OFFICE O/P EST MOD 30 MIN: CPT | Performed by: INTERNAL MEDICINE

## 2022-10-05 PROCEDURE — 90662 IIV NO PRSV INCREASED AG IM: CPT | Performed by: INTERNAL MEDICINE

## 2022-10-05 PROCEDURE — 91312 COVID-19 (PFIZER) BIVALENT BOOSTER 12+YRS: CPT | Performed by: INTERNAL MEDICINE

## 2022-10-05 PROCEDURE — G0008 ADMIN INFLUENZA VIRUS VAC: HCPCS | Performed by: INTERNAL MEDICINE

## 2022-10-05 RX ORDER — METOPROLOL SUCCINATE 50 MG/1
TABLET, EXTENDED RELEASE ORAL
Qty: 30 TABLET | Refills: 1 | Status: SHIPPED | OUTPATIENT
Start: 2022-10-05 | End: 2022-11-30

## 2022-10-05 NOTE — PROGRESS NOTES
Subjective   John Leyva is a 77 y.o. male.     Chief Complaint   Patient presents with   • Follow-up   • Hypertension   • Hyperlipidemia       History of Present Illness   Patient here for follow-up of.  Hyperlipidemia stable on medication hypertension stable on medication diabetes is stable on medication patient still complains of very tired no energy malaise/generalized.  Blood pressure 122/80.  Heart rate is 64.  Patient is on metoprolol 100 mg daily.  Weight loss 8 pounds on purpose.    Current Outpatient Medications:   •  allopurinol (ZYLOPRIM) 100 MG tablet, TAKE ONE TABLET BY MOUTH EVERY DAY, Disp: 90 tablet, Rfl: 3  •  amLODIPine (NORVASC) 5 MG tablet, TAKE ONE TABLET BY MOUTH EVERY DAY, Disp: 90 tablet, Rfl: 3  •  atorvastatin (LIPITOR) 20 MG tablet, TAKE ONE TABLET BY MOUTH EVERY DAY, Disp: 90 tablet, Rfl: 3  •  Cholecalciferol (Vitamin D3) 50 MCG (2000 UT) tablet, Take 2,000 Units by mouth Daily., Disp: , Rfl:   •  doxazosin (CARDURA) 2 MG tablet, TAKE ONE TABLET BY MOUTH AT BEDTIME, Disp: 90 tablet, Rfl: 3  •  DULoxetine (CYMBALTA) 30 MG capsule, TAKE ONE CAPSULE BY MOUTH EVERY DAY, Disp: 90 capsule, Rfl: 3  •  lisinopril-hydrochlorothiazide (PRINZIDE,ZESTORETIC) 10-12.5 MG per tablet, Take 1 tablet by mouth Daily., Disp: 90 tablet, Rfl: 0  •  metoprolol succinate XL (TOPROL-XL) 50 MG 24 hr tablet, 1 daily po, Disp: 30 tablet, Rfl: 1  •  Misc. Devices (Commode Bedside) misc, Use as needed, Disp: 1 each, Rfl: 0  •  Multiple Vitamins-Minerals (MULTIVITAMIN ADULT PO), Take 1 tablet by mouth Daily., Disp: , Rfl:     The following portions of the patient's history were reviewed and updated as appropriate: allergies, current medications, past family history, past medical history, past social history, past surgical history and problem list.    Review of Systems   Constitutional: Positive for fatigue.   Respiratory: Negative.    Cardiovascular: Negative.    Gastrointestinal: Negative.    Musculoskeletal:  Negative.    Skin: Negative.    Neurological: Negative.    Psychiatric/Behavioral: Negative.        Objective   Physical Exam  Cardiovascular:      Rate and Rhythm: Normal rate and regular rhythm.      Heart sounds: Normal heart sounds.   Pulmonary:      Effort: Pulmonary effort is normal.      Breath sounds: Normal breath sounds.   Abdominal:      General: Bowel sounds are normal.   Musculoskeletal:      Cervical back: Neck supple.   Skin:     General: Skin is warm.   Neurological:      Mental Status: He is alert and oriented to person, place, and time.         All tests have been reviewed.    Assessment & Plan   Diagnoses and all orders for this visit:    Hyperlipidemia, stable on atorvastatin 20 mg daily continue medication  -     CK  -     Comprehensive Metabolic Panel  -     Lipid Panel    Essential hypertension stable on metoprolol 100 mg daily since patient complains of feeling tired and malaise we will cut down metoprolol to 50 mg daily since her blood pressure today systolic of 120.  -     metoprolol succinate XL (TOPROL-XL) 50 MG 24 hr tablet; 1 daily po    Type 2 diabetes mellitus without complication, without long-term current use of insulin ([facility]) stable on diet continue  -     Hemoglobin A1c    Other fatigue lower metoprolol and check lab  -     metoprolol succinate XL (TOPROL-XL) 50 MG 24 hr tablet; 1 daily po  -     Vitamin B12  -     Vitamin D 25 Hydroxy  -     Folate  -     CBC & Differential  -     TSH    Vitamin D deficiency, unspecified   -     Vitamin D 25 Hydroxy    Other orders  -     Fluzone High-Dose 65+yrs ([id number])  -     COVID-19 Bivalent Booster ([employer]) 12+yrs    week 3 after blood tests

## 2022-10-06 LAB
25(OH)D3+25(OH)D2 SERPL-MCNC: 46.9 NG/ML (ref 30–100)
ALBUMIN SERPL-MCNC: 4.3 G/DL (ref 3.5–5.2)
ALBUMIN/GLOB SERPL: 1.4 G/DL
ALP SERPL-CCNC: 121 U/L (ref 39–117)
ALT SERPL-CCNC: 21 U/L (ref 1–41)
AST SERPL-CCNC: 21 U/L (ref 1–40)
BASOPHILS # BLD AUTO: 0.04 10*3/MM3 (ref 0–0.2)
BASOPHILS NFR BLD AUTO: 0.5 % (ref 0–1.5)
BILIRUB SERPL-MCNC: 1.1 MG/DL (ref 0–1.2)
BUN SERPL-MCNC: 15 MG/DL (ref 8–23)
BUN/CREAT SERPL: 13.8 (ref 7–25)
CALCIUM SERPL-MCNC: 9.8 MG/DL (ref 8.6–10.5)
CHLORIDE SERPL-SCNC: 100 MMOL/L (ref 98–107)
CHOLEST SERPL-MCNC: 151 MG/DL (ref 0–200)
CK SERPL-CCNC: 265 U/L (ref 20–200)
CO2 SERPL-SCNC: 29.6 MMOL/L (ref 22–29)
CREAT SERPL-MCNC: 1.09 MG/DL (ref 0.76–1.27)
EGFRCR SERPLBLD CKD-EPI 2021: 70.8 ML/MIN/1.73
EOSINOPHIL # BLD AUTO: 0.19 10*3/MM3 (ref 0–0.4)
EOSINOPHIL NFR BLD AUTO: 2.1 % (ref 0.3–6.2)
ERYTHROCYTE [DISTWIDTH] IN BLOOD BY AUTOMATED COUNT: 13.3 % (ref 12.3–15.4)
FOLATE SERPL-MCNC: >20 NG/ML (ref 4.78–24.2)
GLOBULIN SER CALC-MCNC: 3 GM/DL
GLUCOSE SERPL-MCNC: 130 MG/DL (ref 65–99)
HBA1C MFR BLD: 6.4 % (ref 4.8–5.6)
HCT VFR BLD AUTO: 46.9 % (ref 37.5–51)
HDLC SERPL-MCNC: 41 MG/DL (ref 40–60)
HGB BLD-MCNC: 16.4 G/DL (ref 13–17.7)
IMM GRANULOCYTES # BLD AUTO: 0.03 10*3/MM3 (ref 0–0.05)
IMM GRANULOCYTES NFR BLD AUTO: 0.3 % (ref 0–0.5)
LDLC SERPL CALC-MCNC: 85 MG/DL (ref 0–100)
LYMPHOCYTES # BLD AUTO: 2.89 10*3/MM3 (ref 0.7–3.1)
LYMPHOCYTES NFR BLD AUTO: 32.5 % (ref 19.6–45.3)
MCH RBC QN AUTO: 31.2 PG (ref 26.6–33)
MCHC RBC AUTO-ENTMCNC: 35 G/DL (ref 31.5–35.7)
MCV RBC AUTO: 89.3 FL (ref 79–97)
MONOCYTES # BLD AUTO: 0.62 10*3/MM3 (ref 0.1–0.9)
MONOCYTES NFR BLD AUTO: 7 % (ref 5–12)
NEUTROPHILS # BLD AUTO: 5.11 10*3/MM3 (ref 1.7–7)
NEUTROPHILS NFR BLD AUTO: 57.6 % (ref 42.7–76)
NRBC BLD AUTO-RTO: 0 /100 WBC (ref 0–0.2)
PLATELET # BLD AUTO: 240 10*3/MM3 (ref 140–450)
POTASSIUM SERPL-SCNC: 4.7 MMOL/L (ref 3.5–5.2)
PROT SERPL-MCNC: 7.3 G/DL (ref 6–8.5)
RBC # BLD AUTO: 5.25 10*6/MM3 (ref 4.14–5.8)
SODIUM SERPL-SCNC: 139 MMOL/L (ref 136–145)
TRIGL SERPL-MCNC: 142 MG/DL (ref 0–150)
TSH SERPL DL<=0.005 MIU/L-ACNC: 2.58 UIU/ML (ref 0.27–4.2)
VIT B12 SERPL-MCNC: 578 PG/ML (ref 211–946)
VLDLC SERPL CALC-MCNC: 25 MG/DL (ref 5–40)
WBC # BLD AUTO: 8.88 10*3/MM3 (ref 3.4–10.8)

## 2022-10-14 RX ORDER — ATORVASTATIN CALCIUM 20 MG/1
TABLET, FILM COATED ORAL
Qty: 90 TABLET | Refills: 3 | Status: SHIPPED | OUTPATIENT
Start: 2022-10-14

## 2022-10-20 ENCOUNTER — HOSPITAL ENCOUNTER (OUTPATIENT)
Facility: HOSPITAL | Age: 77
Setting detail: HOSPITAL OUTPATIENT SURGERY
Discharge: HOME | End: 2022-10-20
Attending: INTERNAL MEDICINE | Admitting: INTERNAL MEDICINE

## 2022-10-20 ENCOUNTER — ANESTHESIA EVENT (OUTPATIENT)
Dept: GASTROENTEROLOGY | Facility: HOSPITAL | Age: 77
End: 2022-10-20

## 2022-10-20 ENCOUNTER — ANESTHESIA (OUTPATIENT)
Dept: GASTROENTEROLOGY | Facility: HOSPITAL | Age: 77
End: 2022-10-20

## 2022-10-20 VITALS
SYSTOLIC BLOOD PRESSURE: 130 MMHG | RESPIRATION RATE: 16 BRPM | TEMPERATURE: 97.4 F | HEIGHT: 68 IN | DIASTOLIC BLOOD PRESSURE: 74 MMHG | HEART RATE: 67 BPM | OXYGEN SATURATION: 93 % | WEIGHT: 241 LBS | BODY MASS INDEX: 36.53 KG/M2

## 2022-10-20 DIAGNOSIS — Z12.11 ENCOUNTER FOR SCREENING FOR MALIGNANT NEOPLASM OF COLON: ICD-10-CM

## 2022-10-20 PROCEDURE — 88305 TISSUE EXAM BY PATHOLOGIST: CPT

## 2022-10-20 PROCEDURE — 25010000002 PROPOFOL 10 MG/ML EMULSION: Performed by: NURSE ANESTHETIST, CERTIFIED REGISTERED

## 2022-10-20 PROCEDURE — 45385 COLONOSCOPY W/LESION REMOVAL: CPT | Performed by: INTERNAL MEDICINE

## 2022-10-20 RX ORDER — SODIUM CHLORIDE 9 MG/ML
70 INJECTION, SOLUTION INTRAVENOUS CONTINUOUS PRN
Status: DISCONTINUED | OUTPATIENT
Start: 2022-10-20 | End: 2022-10-20 | Stop reason: HOSPADM

## 2022-10-20 RX ORDER — ONDANSETRON 2 MG/ML
4 INJECTION INTRAMUSCULAR; INTRAVENOUS ONCE AS NEEDED
Status: DISCONTINUED | OUTPATIENT
Start: 2022-10-20 | End: 2022-10-20 | Stop reason: HOSPADM

## 2022-10-20 RX ORDER — LIDOCAINE HYDROCHLORIDE 20 MG/ML
INJECTION, SOLUTION INTRAVENOUS AS NEEDED
Status: DISCONTINUED | OUTPATIENT
Start: 2022-10-20 | End: 2022-10-20 | Stop reason: SURG

## 2022-10-20 RX ORDER — PROPOFOL 10 MG/ML
VIAL (ML) INTRAVENOUS AS NEEDED
Status: DISCONTINUED | OUTPATIENT
Start: 2022-10-20 | End: 2022-10-20 | Stop reason: SURG

## 2022-10-20 RX ORDER — KETAMINE HYDROCHLORIDE 50 MG/ML
INJECTION, SOLUTION, CONCENTRATE INTRAMUSCULAR; INTRAVENOUS AS NEEDED
Status: DISCONTINUED | OUTPATIENT
Start: 2022-10-20 | End: 2022-10-20 | Stop reason: SURG

## 2022-10-20 RX ADMIN — KETAMINE HYDROCHLORIDE 50 MG: 50 INJECTION, SOLUTION INTRAMUSCULAR; INTRAVENOUS at 08:22

## 2022-10-20 RX ADMIN — LIDOCAINE HYDROCHLORIDE 100 MG: 20 INJECTION, SOLUTION INTRAVENOUS at 08:22

## 2022-10-20 RX ADMIN — PROPOFOL 300 MG: 10 INJECTION, EMULSION INTRAVENOUS at 08:22

## 2022-10-20 RX ADMIN — SODIUM CHLORIDE 70 ML/HR: 9 INJECTION, SOLUTION INTRAVENOUS at 07:33

## 2022-10-20 NOTE — ANESTHESIA POSTPROCEDURE EVALUATION
Patient: John Leyva    Procedure Summary     Date: 10/20/22 Room / Location[facility]    Anesthesia Start: 0818 Anesthesia Stop: 0849    Procedure: COLONOSCOPY with polypectomy (Anus) Diagnosis:       Encounter for screening for malignant neoplasm of colon      (Encounter for screening for malignant neoplasm of colon [Z12.11])    Surgeons: Nii Longoria MD Provider: Jimbo Spaulding CRNA    Anesthesia Type: MAC ASA Status: 3          Anesthesia Type: MAC    Vitals  Vitals Value Taken Time   /74 10/20/22 0918   Temp 97.4 °F (36.3 °C) 10/20/22 0848   Pulse 67 10/20/22 0918   Resp 16 10/20/22 0918   SpO2 93 % 10/20/22 0918           Post Anesthesia Care and Evaluation    Patient location during evaluation: PHASE II  Patient participation: complete - patient participated  Level of consciousness: awake  Pain score: 1  Pain management: adequate    Airway patency: patent  Anesthetic complications: No anesthetic complications  PONV Status: controlled  Cardiovascular status: acceptable and stable  Respiratory status: acceptable  Hydration status: acceptable

## 2022-10-20 NOTE — ANESTHESIA PREPROCEDURE EVALUATION
Anesthesia Evaluation     Patient summary reviewed and Nursing notes reviewed   no history of anesthetic complications:  NPO Solid Status: > 8 hours  NPO Liquid Status: > 8 hours           Airway   Mallampati: II  TM distance: >3 FB  Neck ROM: full  no difficulty expected  Dental - normal exam     Pulmonary - negative pulmonary ROS and normal exam   Cardiovascular - normal exam    Rhythm: regular  Rate: normal    (+) hypertension 2 medications or greater, hyperlipidemia,       Neuro/Psych  (+) numbness,    GI/Hepatic/Renal/Endo    (+)   renal disease, diabetes mellitus, thyroid problem hypothyroidism    Musculoskeletal     Abdominal    Substance History - negative use     OB/GYN negative ob/gyn ROS         Other   arthritis,                      Anesthesia Plan    ASA 3     MAC     (Pt told that intravenous sedation will be used as the primary anesthetic along with local anesthesia if necessary. Every effort will be made to make sure the patient is comfortable.     The patient was told they may or may not have recall for the procedure. It was further explained that if the MAC was not adequate that a general anesthetic with either an LMA or endotracheal tube would be required.     Will proceed with the plan of care.)  intravenous induction     Anesthetic plan, risks, benefits, and alternatives have been provided, discussed and informed consent has been obtained with: patient.        CODE STATUS:

## 2022-10-21 LAB — REF LAB TEST METHOD: NORMAL

## 2022-10-27 ENCOUNTER — OFFICE VISIT (OUTPATIENT)
Dept: INTERNAL MEDICINE | Facility: CLINIC | Age: 77
End: 2022-10-27

## 2022-10-27 VITALS
SYSTOLIC BLOOD PRESSURE: 126 MMHG | TEMPERATURE: 96.8 F | DIASTOLIC BLOOD PRESSURE: 82 MMHG | OXYGEN SATURATION: 98 % | HEIGHT: 68 IN | BODY MASS INDEX: 36.37 KG/M2 | WEIGHT: 240 LBS | HEART RATE: 70 BPM

## 2022-10-27 DIAGNOSIS — E78.5 HYPERLIPIDEMIA, UNSPECIFIED HYPERLIPIDEMIA TYPE: Primary | ICD-10-CM

## 2022-10-27 DIAGNOSIS — E11.9 TYPE 2 DIABETES MELLITUS WITHOUT COMPLICATION, WITHOUT LONG-TERM CURRENT USE OF INSULIN: ICD-10-CM

## 2022-10-27 DIAGNOSIS — R53.83 OTHER FATIGUE: ICD-10-CM

## 2022-10-27 DIAGNOSIS — E03.9 HYPOTHYROIDISM, UNSPECIFIED TYPE: ICD-10-CM

## 2022-10-27 DIAGNOSIS — E55.9 VITAMIN D DEFICIENCY: ICD-10-CM

## 2022-10-27 DIAGNOSIS — I10 ESSENTIAL HYPERTENSION: ICD-10-CM

## 2022-10-27 PROCEDURE — 99214 OFFICE O/P EST MOD 30 MIN: CPT | Performed by: INTERNAL MEDICINE

## 2022-10-27 NOTE — PROGRESS NOTES
Subjective   John Leyva is a 77 y.o. male.     Chief Complaint   Patient presents with   • Follow-up     Recent lab results   • Hypertension   • Hyperlipidemia       History of Present Illness   Patient here for follow-up.  Hypertension stable on medication after cutting down metoprolol to 50 mg.  Fatigue improved after cutting down metoprolol.  Diabetes is stable diet.  Hyperlipidemia stable on medication.  Weight is still high.  Vitamin D stable on supplement.    Current Outpatient Medications:   •  allopurinol (ZYLOPRIM) 100 MG tablet, TAKE ONE TABLET BY MOUTH EVERY DAY (Patient taking differently: Take 1 tablet by mouth Daily.), Disp: 90 tablet, Rfl: 3  •  amLODIPine (NORVASC) 5 MG tablet, TAKE ONE TABLET BY MOUTH EVERY DAY (Patient taking differently: Take 1 tablet by mouth Daily.), Disp: 90 tablet, Rfl: 3  •  atorvastatin (LIPITOR) 20 MG tablet, TAKE ONE TABLET BY MOUTH EVERY DAY (Patient taking differently: Take 1 tablet by mouth Daily.), Disp: 90 tablet, Rfl: 3  •  Cholecalciferol (Vitamin D3) 50 MCG (2000 UT) tablet, Take 2,000 Units by mouth Daily., Disp: , Rfl:   •  doxazosin (CARDURA) 2 MG tablet, TAKE ONE TABLET BY MOUTH AT BEDTIME (Patient taking differently: Take 1 tablet by mouth Every Night.), Disp: 90 tablet, Rfl: 3  •  DULoxetine (CYMBALTA) 30 MG capsule, TAKE ONE CAPSULE BY MOUTH EVERY DAY (Patient taking differently: 1 capsule Daily.), Disp: 90 capsule, Rfl: 3  •  lisinopril-hydrochlorothiazide (PRINZIDE,ZESTORETIC) 10-12.5 MG per tablet, Take 1 tablet by mouth Daily., Disp: 90 tablet, Rfl: 0  •  metoprolol succinate XL (TOPROL-XL) 50 MG 24 hr tablet, 1 daily po (Patient taking differently: 1 tablet Daily. 1 daily po), Disp: 30 tablet, Rfl: 1  •  Multiple Vitamins-Minerals (MULTIVITAMIN ADULT PO), Take 1 tablet by mouth Daily., Disp: , Rfl:     The following portions of the patient's history were reviewed and updated as appropriate: allergies, current medications, past family history, past  medical history, past social history, past surgical history and problem list.    Review of Systems   Constitutional: Negative.    Respiratory: Negative.    Cardiovascular: Negative.    Gastrointestinal: Negative.    Musculoskeletal: Negative.    Skin: Negative.    Neurological: Negative.    Psychiatric/Behavioral: Negative.        Objective   Physical Exam  Cardiovascular:      Rate and Rhythm: Normal rate and regular rhythm.      Heart sounds: Normal heart sounds.   Pulmonary:      Effort: Pulmonary effort is normal.      Breath sounds: Normal breath sounds.   Abdominal:      General: Bowel sounds are normal.   Musculoskeletal:      Cervical back: Neck supple.   Skin:     General: Skin is warm.   Neurological:      Mental Status: He is alert and oriented to person, place, and time.         All tests have been reviewed.    Assessment & Plan   There are no diagnoses linked to this encounter.          Hyperlipidemia, stable on atorvastatin 20 mg daily continue medication     Essential hypertension stable on  metoprolol  50 mg daily      Type 2 diabetes mellitus without complication, without long-term current use of insulin (HCC) stable on diet      Other fatigue improved after cutting down metoprolol     Vitamin D deficiency, unspecified   -     Vitamin D 25 Hydroxy   6 mo

## 2022-11-09 RX ORDER — ALLOPURINOL 100 MG/1
TABLET ORAL
Qty: 90 TABLET | Refills: 3 | Status: SHIPPED | OUTPATIENT
Start: 2022-11-09

## 2022-11-30 DIAGNOSIS — R53.83 OTHER FATIGUE: ICD-10-CM

## 2022-11-30 DIAGNOSIS — I10 ESSENTIAL HYPERTENSION: ICD-10-CM

## 2022-11-30 RX ORDER — METOPROLOL SUCCINATE 50 MG/1
TABLET, EXTENDED RELEASE ORAL
Qty: 90 TABLET | Refills: 3 | Status: SHIPPED | OUTPATIENT
Start: 2022-11-30

## 2022-11-30 NOTE — TELEPHONE ENCOUNTER
Rx Refill Note  Requested Prescriptions     Pending Prescriptions Disp Refills   • metoprolol succinate XL (TOPROL-XL) 50 MG 24 hr tablet [Pharmacy Med Name: metoprolol succinate ER 50 mg tablet,extended release 24 hr] 30 tablet 1     Sig: TAKE ONE TABLET BY MOUTH EVERY DAY      Last office visit with prescribing clinician: 10/27/2022     Next office visit with prescribing clinician: 4/6/2023                         Would you like a call back once the refill request has been completed: [] Yes [] No    If the office needs to give you a call back, can they leave a voicemail: [] Yes [] No    Thuy Borjas MA  11/30/22, 10:19 EST

## 2022-12-16 RX ORDER — LISINOPRIL AND HYDROCHLOROTHIAZIDE 12.5; 1 MG/1; MG/1
TABLET ORAL
Qty: 90 TABLET | Refills: 3 | Status: SHIPPED | OUTPATIENT
Start: 2022-12-16

## 2022-12-16 NOTE — TELEPHONE ENCOUNTER
Rx Refill Note  Requested Prescriptions     Pending Prescriptions Disp Refills   • lisinopril-hydrochlorothiazide (PRINZIDE,ZESTORETIC) 10-12.5 MG per tablet [Pharmacy Med Name: lisinopril 10 mg-hydrochlorothiazide 12.5 mg tablet] 90 tablet 3     Sig: TAKE ONE TABLET BY MOUTH DAILY      Last office visit with prescribing clinician: 10/27/2022   Last telemedicine visit with prescribing clinician: 4/6/2023   Next office visit with prescribing clinician: 4/6/2023                         Would you like a call back once the refill request has been completed: [] Yes [] No    If the office needs to give you a call back, can they leave a voicemail: [] Yes [] No    Deborah Baker MA  12/16/22, 18:03 EST

## 2023-01-05 ENCOUNTER — OFFICE VISIT (OUTPATIENT)
Dept: GASTROENTEROLOGY | Facility: CLINIC | Age: 78
End: 2023-01-05
Payer: MEDICARE

## 2023-01-05 VITALS
WEIGHT: 246 LBS | DIASTOLIC BLOOD PRESSURE: 80 MMHG | TEMPERATURE: 97.3 F | OXYGEN SATURATION: 95 % | BODY MASS INDEX: 37.28 KG/M2 | HEART RATE: 81 BPM | HEIGHT: 68 IN | SYSTOLIC BLOOD PRESSURE: 148 MMHG

## 2023-01-05 DIAGNOSIS — R74.8 ELEVATED ALKALINE PHOSPHATASE LEVEL: ICD-10-CM

## 2023-01-05 DIAGNOSIS — E66.01 CLASS 2 SEVERE OBESITY DUE TO EXCESS CALORIES WITH SERIOUS COMORBIDITY AND BODY MASS INDEX (BMI) OF 37.0 TO 37.9 IN ADULT: ICD-10-CM

## 2023-01-05 DIAGNOSIS — D12.6 ADENOMATOUS POLYP OF COLON, UNSPECIFIED PART OF COLON: Primary | ICD-10-CM

## 2023-01-05 PROCEDURE — 99214 OFFICE O/P EST MOD 30 MIN: CPT | Performed by: NURSE PRACTITIONER

## 2023-01-05 NOTE — PATIENT INSTRUCTIONS
High fiber, low fat diet with liberal water intake.   Advised to exercise 30 minutes 4-5 days per week.   Advised to lose 20-25 pounds in the next 6-12 months.   Colonoscopy for surveillance in 7 years, 2029.   Labs  Follow up: Pending labs

## 2023-01-05 NOTE — PROGRESS NOTES
Follow Up Note     Date: 2023   Patient Name: John Leyva  MRN: [id number]  : 1945     Primary Care Provider: Mahesh Almanza MD     Chief Complaint   Patient presents with   • Follow-up   • Colon Polyps     History of present illness:   2023  John Leyva is a 77 y.o. male who is here today for follow up after colonoscopy. No problems or concerns today.     Interval History:  2022  The patient denies recent change in bowel habits. There is no diarrhea or constipation. There is no history of abdominal pain. There is no history of overt GI bleed (hematemesis melena or hematochezia). The patient denies nausea or vomiting. There is no history of reflux. The patient denies dysphagia or odynophagia. There is no history of recent significant weight loss. There is no history of liver disease in the past. There is no family history of GI malignancy. The patient's last colonoscopy was over 5 years ago by Dr. Yen and was normal per patient.     Subjective      Past Medical History:   Diagnosis Date   • Arthritis    • Bell's palsy 10/19/2022    1974   • BPH (benign prostatic hyperplasia)    • Cataract, bilateral     s/p surgery   • Diabetes mellitus (HCC)     no medications    • ED (erectile dysfunction)    • Elevated cholesterol    • Elevated PSA    • Gout    • Hypertension    • Hypogonadism in male    • Hypothyroidism     no medications   • Impaired functional mobility, balance, gait, and endurance    • Low kidney function    • Retinal detachment    • Wears glasses      Past Surgical History:   Procedure Laterality Date   • APPENDECTOMY     • COLONOSCOPY      over 5 years ago  Dr Yen   • COLONOSCOPY N/A 10/20/2022    Procedure: COLONOSCOPY with polypectomy;  Surgeon: Nii Longoria MD;  Location: [facility] ENDOSCOPY;  Service: Gastroenterology;  Laterality: N/A;   • ELBOW OLECRANNON BURSA EXCISION Right    • EYE SURGERY Bilateral     cataracts removed   • LUMBAR LAMINECTOMY DISCECTOMY  DECOMPRESSION Right 2020    Procedure: LUMBAR LAMINECTOMY L4-5, HEMILAMINECTOMY L3-4 RIGHT;  Surgeon: Paulie Daniels MD;  Location: [facility] OR;  Service: Neurosurgery;  Laterality: Right;   • RETINAL DETACHMENT SURGERY Right     x3 surgeries   • SKIN BIOPSY      benign   • TOTAL HIP ARTHROPLASTY Right 2021    Procedure: Elective total hip arthroplasty;  Surgeon: Jose Carlson MD;  Location: [facility] OR;  Service: Orthopedics;  Laterality: Right;     Family History   Problem Relation Age of Onset   • Cancer Mother    • Cancer Father         Bone cancer   • Cancer Other    • Colon cancer Neg Hx    • Liver cancer Neg Hx    • Rectal cancer Neg Hx    • Stomach cancer Neg Hx      Social History     Socioeconomic History   • Marital status:      Spouse name: Yolanda   Tobacco Use   • Smoking status: Former     Packs/day: 3.00     Years: 10.00     Pack years: 30.00     Types: Cigarettes     Quit date:      Years since quittin.0   • Smokeless tobacco: Never   Vaping Use   • Vaping Use: Never used   Substance and Sexual Activity   • Alcohol use: Not Currently   • Drug use: No   • Sexual activity: Defer       Current Outpatient Medications:   •  allopurinol (ZYLOPRIM) 100 MG tablet, TAKE ONE TABLET BY MOUTH EVERY DAY, Disp: 90 tablet, Rfl: 3  •  amLODIPine (NORVASC) 5 MG tablet, TAKE ONE TABLET BY MOUTH EVERY DAY (Patient taking differently: Take 5 mg by mouth Daily.), Disp: 90 tablet, Rfl: 3  •  atorvastatin (LIPITOR) 20 MG tablet, TAKE ONE TABLET BY MOUTH EVERY DAY (Patient taking differently: Take 20 mg by mouth Daily.), Disp: 90 tablet, Rfl: 3  •  Cholecalciferol (Vitamin D3) 50 MCG (2000 UT) tablet, Take 2,000 Units by mouth Daily., Disp: , Rfl:   •  doxazosin (CARDURA) 2 MG tablet, TAKE ONE TABLET BY MOUTH AT BEDTIME (Patient taking differently: Take 2 mg by mouth Every Night.), Disp: 90 tablet, Rfl: 3  •  DULoxetine (CYMBALTA) 30 MG capsule, TAKE ONE CAPSULE BY MOUTH EVERY DAY (Patient  taking differently: 30 mg Daily.), Disp: 90 capsule, Rfl: 3  •  lisinopril-hydrochlorothiazide (PRINZIDE,ZESTORETIC) 10-12.5 MG per tablet, TAKE ONE TABLET BY MOUTH DAILY, Disp: 90 tablet, Rfl: 3  •  metoprolol succinate XL (TOPROL-XL) 50 MG 24 hr tablet, TAKE ONE TABLET BY MOUTH EVERY DAY, Disp: 90 tablet, Rfl: 3  •  Multiple Vitamins-Minerals (MULTIVITAMIN ADULT PO), Take 1 tablet by mouth Daily., Disp: , Rfl:      No Known Allergies     The following portions of the patient's history were reviewed and updated as appropriate: allergies, current medications, past family history, past medical history, past social history, past surgical history and problem list.  Objective     Physical Exam  Vitals and nursing note reviewed.   Constitutional:       General: He is not in acute distress.     Appearance: Normal appearance. He is well-developed.   HENT:      Head: Normocephalic and atraumatic.      Mouth/Throat:      Mouth: Mucous membranes are not pale, not dry and not cyanotic.   Eyes:      General: Lids are normal.   Neck:      Trachea: Trachea normal.   Cardiovascular:      Rate and Rhythm: Normal rate.   Pulmonary:      Effort: Pulmonary effort is normal. No respiratory distress.      Breath sounds: Normal breath sounds.   Abdominal:      Tenderness: There is no abdominal tenderness.   Skin:     General: Skin is warm and dry.   Neurological:      Mental Status: He is alert and oriented to person, place, and time.   Psychiatric:         Mood and Affect: Mood normal.         Speech: Speech normal.         Behavior: Behavior normal. Behavior is cooperative.       Vitals:    01/05/23 1459   BP: 148/80   Pulse: 81   Temp: 97.3 °F (36.3 °C)   TempSrc: Infrared   SpO2: 95%   Weight: 112 kg (246 lb)   Height: 171.5 cm (67.5\")     Body mass index is 37.96 kg/m².     Results Review:   I reviewed the patient's new clinical results.    Admission on 10/20/2022, Discharged on 10/20/2022   Component Date Value Ref Range Status   •  Reference Lab Report 10/20/2022    Final                    Value:[facility]  [address] [place] [address]  Phone: [phone] or [phone]  Fax: [phone]  Blayne Cooley M.D., Medical Director    PATIENT NAME                           LABORATORY NO.  [id number]  ADRIENNE [id number]  [id number]                         AGE              SEX  N           CLIENT REF #  [facility]            77      1945      [id number]   [id number]    [address]                REQUESTING M.D.     ATTENDING M.D.     COPY TO.  [address]                        [place] [address]                 [facility]  DATE COLLECTED      DATE RECEIVED      DATE REPORTED  10/20/2022          10/20/2022         10/21/2022    DIAGNOSIS:  A.   CECUM POLYP:  Tubular adenoma  Negative for carcinoma or high-grade dysplasia    B.   TRANSVERSE COLON POLYP:  Colonic mucosa with no significant histopathologic abnormality  No evidence of dysplasia, carcinoma,                           or microscopic colitis    C.   SPLENIC FLEXURE POLYP:  Benign colonic mucosa with prominent lymphoid aggregate  Negative for dysplasia or carcinoma    D.   RECTAL POLYP:  Hyperplastic polyp  Negative for dysplasia or carcinoma    CLINICAL HISTORY:  Encounter for screening for malignant neoplasm of colon                         REVIEWED, DIAGNOSED AND ELECTRONICALLY  SIGNED BY:    Paul Lerma M.D.,F.C.A.P.  CPT CODES:  88305x4        Colonoscopy dated 10/20/2022 per Dr. Chavis  - One 3 mm polyp in the cecum, removed with a cold snare. Resected and retrieved.  - One 2 to 3 mm polyp in the proximal transverse colon, removed with a cold snare. Resected and retrieved.  - One 5 mm polyp at the splenic flexure, removed with a cold snare. Resected and retrieved.  - One 2 to 3 mm polyp in the rectum, removed with a cold snare. Resected and  retrieved.  - Diverticulosis in the sigmoid colon, in the descending colon, in the transverse colon and in the ascending colon.  - Non-bleeding external and internal hemorrhoids.  - The examined portion of the ileum was normal.  A.   CECUM POLYP:   Tubular adenoma   Negative for carcinoma or high-grade dysplasia   B.   TRANSVERSE COLON POLYP:   Colonic mucosa with no significant histopathologic abnormality   No evidence of dysplasia, carcinoma, or microscopic colitis   C.   SPLENIC FLEXURE POLYP:   Benign colonic mucosa with prominent lymphoid aggregate   Negative for dysplasia or carcinoma   D.   RECTAL POLYP:   Hyperplastic polyp   Negative for dysplasia or carcinoma    Assessment / Plan      1. Adenomatous polyp of colon, unspecified part of colon  Colonoscopy with polyps removed, 1 tubular adenoma without dysplasia, 1 hyperplastic, 2 with benign colonic mucosa.  There is no family history of colon cancer.  Colonoscopy for surveillance in 7 years, 2029, per current ACG guidelines.    2. Elevated alkaline phosphatase level  3. Class 2 severe obesity due to excess calories with serious comorbidity and body mass index (BMI) of 37.0 to 37.9 in adult (HCC)  BMI 37.96  Upon review of records, he has a history of borderline elevation of alkaline phosphatase for the past couple of years.  Normal AST/ALT/total bilirubin. Normal platelet count.  Lipid panel normal.  No abdominal imaging to interpret.  Labs-needs further evaluation if GGT elevated  Advise low-fat diet, exercise and weight reduction.    - Comprehensive Metabolic Panel; Future  - Gamma GT; Future    Patient Instructions   1. High fiber, low fat diet with liberal water intake.   2. Advised to exercise 30 minutes 4-5 days per week.   3. Advised to lose 20-25 pounds in the next 6-12 months.   4. Colonoscopy for surveillance in 7 years, 2029.   5. Labs  6. Follow up: Pending labs    Anton Espinal, APRN  1/5/2023    Please note that portions of this note were  completed with a voice recognition program.

## 2023-04-06 ENCOUNTER — OFFICE VISIT (OUTPATIENT)
Dept: INTERNAL MEDICINE | Facility: CLINIC | Age: 78
End: 2023-04-06
Payer: MEDICARE

## 2023-04-06 VITALS
TEMPERATURE: 97 F | HEIGHT: 68 IN | OXYGEN SATURATION: 97 % | BODY MASS INDEX: 37.59 KG/M2 | SYSTOLIC BLOOD PRESSURE: 128 MMHG | WEIGHT: 248 LBS | DIASTOLIC BLOOD PRESSURE: 72 MMHG | HEART RATE: 74 BPM

## 2023-04-06 DIAGNOSIS — E03.9 ACQUIRED HYPOTHYROIDISM: ICD-10-CM

## 2023-04-06 DIAGNOSIS — E55.9 VITAMIN D DEFICIENCY: ICD-10-CM

## 2023-04-06 DIAGNOSIS — M10.00 IDIOPATHIC GOUT, UNSPECIFIED CHRONICITY, UNSPECIFIED SITE: ICD-10-CM

## 2023-04-06 DIAGNOSIS — I10 ESSENTIAL HYPERTENSION: ICD-10-CM

## 2023-04-06 DIAGNOSIS — E11.65 TYPE 2 DIABETES MELLITUS WITH HYPERGLYCEMIA, WITHOUT LONG-TERM CURRENT USE OF INSULIN: ICD-10-CM

## 2023-04-06 DIAGNOSIS — M48.062 SPINAL STENOSIS OF LUMBAR REGION WITH NEUROGENIC CLAUDICATION: ICD-10-CM

## 2023-04-06 DIAGNOSIS — E78.2 MIXED HYPERLIPIDEMIA: Primary | ICD-10-CM

## 2023-04-06 DIAGNOSIS — N40.0 BENIGN NON-NODULAR PROSTATIC HYPERPLASIA: ICD-10-CM

## 2023-04-06 PROBLEM — R53.83 OTHER FATIGUE: Status: RESOLVED | Noted: 2019-08-21 | Resolved: 2023-04-06

## 2023-04-06 PROBLEM — Z12.11 ENCOUNTER FOR SCREENING FOR MALIGNANT NEOPLASM OF COLON: Status: RESOLVED | Noted: 2022-07-26 | Resolved: 2023-04-06

## 2023-04-06 NOTE — PROGRESS NOTES
The ABCs of the Annual Wellness Visit  Subsequent Medicare Wellness Visit    Subjective      John Leyva is a 77 y.o. male who presents for a Subsequent Medicare Wellness Visit.    The following portions of the patient's history were reviewed and   updated as appropriate: allergies, current medications, past family history, past medical history, past social history, past surgical history and problem list.    Compared to one year ago, the patient feels his physical   health is the same.    Compared to one year ago, the patient feels his mental   health is the same.    Recent Hospitalizations:  He was not admitted to the hospital during the last year.       Current Medical Providers:  Patient Care Team:  Mahesh Almanza MD as PCP - General (Internal Medicine)    Outpatient Medications Prior to Visit   Medication Sig Dispense Refill   • allopurinol (ZYLOPRIM) 100 MG tablet TAKE ONE TABLET BY MOUTH EVERY DAY 90 tablet 3   • amLODIPine (NORVASC) 5 MG tablet TAKE ONE TABLET BY MOUTH EVERY DAY (Patient taking differently: Take 1 tablet by mouth Daily.) 90 tablet 3   • atorvastatin (LIPITOR) 20 MG tablet TAKE ONE TABLET BY MOUTH EVERY DAY (Patient taking differently: Take 1 tablet by mouth Daily.) 90 tablet 3   • Cholecalciferol (Vitamin D3) 50 MCG (2000 UT) tablet Take 1 tablet by mouth Daily.     • doxazosin (CARDURA) 2 MG tablet TAKE ONE TABLET BY MOUTH AT BEDTIME (Patient taking differently: Take 1 tablet by mouth Every Night.) 90 tablet 3   • DULoxetine (CYMBALTA) 30 MG capsule TAKE ONE CAPSULE BY MOUTH EVERY DAY (Patient taking differently: 1 capsule Daily.) 90 capsule 3   • lisinopril-hydrochlorothiazide (PRINZIDE,ZESTORETIC) 10-12.5 MG per tablet TAKE ONE TABLET BY MOUTH DAILY 90 tablet 3   • metoprolol succinate XL (TOPROL-XL) 50 MG 24 hr tablet TAKE ONE TABLET BY MOUTH EVERY DAY 90 tablet 3   • Multiple Vitamins-Minerals (MULTIVITAMIN ADULT PO) Take 1 tablet by mouth Daily.       No facility-administered  "medications prior to visit.       No opioid medication identified on active medication list. I have reviewed chart for other potential  high risk medication/s and harmful drug interactions in the elderly.          Aspirin is not on active medication list.  Aspirin use is not indicated based on review of current medical condition/s. Risk of harm outweighs potential benefits.  .    Patient Active Problem List   Diagnosis   • Benign non-nodular prostatic hyperplasia   • Vitamin D deficiency   • Type 2 diabetes mellitus with hyperglycemia   • Primary gout   • Hypothyroidism   • Hyperlipidemia   • Essential hypertension   • Other fatigue   • Spinal stenosis of lumbar region with neurogenic claudication   • Primary osteoarthritis of right hip   • Ventral hernia without obstruction or gangrene   • Status post total hip replacement, right   • Encounter for screening for malignant neoplasm of colon     Advance Care Planning   Advance Care Planning     Advance Directive is not on file.  ACP discussion was held with the patient during this visit. Patient has an advance directive (not in EMR), copy requested.     Objective    Vitals:    04/06/23 0904   BP: 128/72   BP Location: Left arm   Patient Position: Sitting   Cuff Size: Adult   Pulse: 74   Temp: 97 °F (36.1 °C)   SpO2: 97%   Weight: 112 kg (248 lb)   Height: 171.5 cm (67.5\")   PainSc: 0-No pain     Estimated body mass index is 38.27 kg/m² as calculated from the following:    Height as of this encounter: 171.5 cm (67.5\").    Weight as of this encounter: 112 kg (248 lb).    Class 2 Severe Obesity (BMI >=35 and <=39.9). Obesity-related health conditions include the following: hypertension. Obesity is improving with treatment. BMI is is above average; BMI management plan is completed. We discussed portion control and increasing exercise.      Does the patient have evidence of cognitive impairment?   No            HEALTH RISK ASSESSMENT    Smoking Status:  Social History "     Tobacco Use   Smoking Status Former   • Packs/day: 3.00   • Years: 10.00   • Pack years: 30.00   • Types: Cigarettes   • Quit date:    • Years since quittin.2   Smokeless Tobacco Never     Alcohol Consumption:  Social History     Substance and Sexual Activity   Alcohol Use Not Currently     Fall Risk Screen:    JAMARI Fall Risk Assessment was completed, and patient is at LOW risk for falls.Assessment completed on:2023    Depression Screening:  PHQ-2/PHQ-9 Depression Screening 2023   Little Interest or Pleasure in Doing Things 0-->not at all   Feeling Down, Depressed or Hopeless 0-->not at all   PHQ-9: Brief Depression Severity Measure Score 0       Health Habits and Functional and Cognitive Screening:  Functional & Cognitive Status 2022   Do you have difficulty preparing food and eating? No   Do you have difficulty bathing yourself, getting dressed or grooming yourself? No   Do you have difficulty using the toilet? No   Do you have difficulty moving around from place to place? No   Do you have trouble with steps or getting out of a bed or a chair? No   Current Diet Well Balanced Diet   Dental Exam Not up to date   Eye Exam Up to date        Eye Exam Comment    Exercise (times per week) 7 times per week   Current Exercises Include Walking   Current Exercise Activities Include -   Do you need help using the phone?  No   Are you deaf or do you have serious difficulty hearing?  No   Do you need help with transportation? No   Do you need help shopping? No   Do you need help preparing meals?  No   Do you need help with housework?  No   Do you need help with laundry? No   Do you need help taking your medications? No   Do you need help managing money? No   Do you ever drive or ride in a car without wearing a seat belt? No   Have you felt unusual stress, anger or loneliness in the last month? No   Who do you live with? Spouse   If you need help, do you have trouble finding someone available to  you? No   Have you been bothered in the last four weeks by sexual problems? No   Do you have difficulty concentrating, remembering or making decisions? No       Age-appropriate Screening Schedule:  Refer to the list below for future screening recommendations based on patient's age, sex and/or medical conditions. Orders for these recommended tests are listed in the plan section. The patient has been provided with a written plan.    Health Maintenance   Topic Date Due   • DIABETIC EYE EXAM  10/16/2020   • ANNUAL WELLNESS VISIT  04/05/2023   • DIABETIC FOOT EXAM  04/05/2023   • HEMOGLOBIN A1C  04/05/2023   • URINE MICROALBUMIN  04/05/2023   • ZOSTER VACCINE (1 of 2) 10/01/2025 (Originally 7/7/1997)   • INFLUENZA VACCINE  08/01/2023   • LIPID PANEL  10/05/2023   • TDAP/TD VACCINES (2 - Td or Tdap) 05/25/2027   • COLORECTAL CANCER SCREENING  10/20/2029   • HEPATITIS C SCREENING  Completed   • COVID-19 Vaccine  Completed   • Pneumococcal Vaccine 65+  Completed   • AAA SCREEN (ONE-TIME)  Completed                  CMS Preventative Services Quick Reference  Risk Factors Identified During Encounter:    None Identified    The above risks/problems have been discussed with the patient.  Pertinent information has been shared with the patient in the After Visit Summary.    Diagnoses and all orders for this visit:    1. Mixed hyperlipidemia (Primary)  -     CK  -     Comprehensive Metabolic Panel  -     Lipid Panel    2. Essential hypertension  -     CBC & Differential    3. Type 2 diabetes mellitus with hyperglycemia, without long-term current use of insulin  -     Hemoglobin A1c  -     MicroAlbumin, Urine, Random - Urine, Clean Catch    4. Vitamin D deficiency  -     Vitamin D,25-Hydroxy    5. Acquired hypothyroidism  -     TSH    6. Benign non-nodular prostatic hyperplasia    7. Idiopathic gout, unspecified chronicity, unspecified site  -     Uric Acid        Follow Up:   Next Medicare Wellness visit to be scheduled in 1 year.       An After Visit Summary and PPPS were made available to the patient.

## 2023-04-06 NOTE — PROGRESS NOTES
Subjective   John Leyva is a 77 y.o. male.     Chief Complaint   Patient presents with   • Hyperlipidemia   • Hypertension   • Diabetes       History of Present Illness       Current Outpatient Medications:   •  allopurinol (ZYLOPRIM) 100 MG tablet, TAKE ONE TABLET BY MOUTH EVERY DAY, Disp: 90 tablet, Rfl: 3  •  amLODIPine (NORVASC) 5 MG tablet, TAKE ONE TABLET BY MOUTH EVERY DAY (Patient taking differently: Take 1 tablet by mouth Daily.), Disp: 90 tablet, Rfl: 3  •  atorvastatin (LIPITOR) 20 MG tablet, TAKE ONE TABLET BY MOUTH EVERY DAY (Patient taking differently: Take 1 tablet by mouth Daily.), Disp: 90 tablet, Rfl: 3  •  Cholecalciferol (Vitamin D3) 50 MCG (2000 UT) tablet, Take 1 tablet by mouth Daily., Disp: , Rfl:   •  doxazosin (CARDURA) 2 MG tablet, TAKE ONE TABLET BY MOUTH AT BEDTIME (Patient taking differently: Take 1 tablet by mouth Every Night.), Disp: 90 tablet, Rfl: 3  •  DULoxetine (CYMBALTA) 30 MG capsule, TAKE ONE CAPSULE BY MOUTH EVERY DAY (Patient taking differently: 1 capsule Daily.), Disp: 90 capsule, Rfl: 3  •  lisinopril-hydrochlorothiazide (PRINZIDE,ZESTORETIC) 10-12.5 MG per tablet, TAKE ONE TABLET BY MOUTH DAILY, Disp: 90 tablet, Rfl: 3  •  metoprolol succinate XL (TOPROL-XL) 50 MG 24 hr tablet, TAKE ONE TABLET BY MOUTH EVERY DAY, Disp: 90 tablet, Rfl: 3  •  Multiple Vitamins-Minerals (MULTIVITAMIN ADULT PO), Take 1 tablet by mouth Daily., Disp: , Rfl:     {Common H&P Review Areas:04848}    Review of Systems    Objective   Physical Exam    All tests have been reviewed.    Assessment & Plan   Diagnoses and all orders for this visit:    Mixed hyperlipidemia  -     CK  -     Comprehensive Metabolic Panel  -     Lipid Panel    Essential hypertension    Type 2 diabetes mellitus with hyperglycemia, without long-term current use of insulin  -     Hemoglobin A1c

## 2023-04-06 NOTE — PROGRESS NOTES
Subjective   John Leyva is a 77 y.o. male and is here for a comprehensive physical exam.     Do you take any herbs or supplements that were not prescribed by a doctor? no  Are you taking calcium supplements? no  Are you taking aspirin daily? no      The following portions of the patient's history were reviewed and updated as appropriate: allergies, current medications, past family history, past medical history, past social history, past surgical history and problem list.    Patient Active Problem List   Diagnosis   • Benign non-nodular prostatic hyperplasia   • Vitamin D deficiency   • Type 2 diabetes mellitus with hyperglycemia   • Primary gout   • Hypothyroidism   • Hyperlipidemia   • Essential hypertension   • Spinal stenosis of lumbar region with neurogenic claudication   • Primary osteoarthritis of right hip   • Ventral hernia without obstruction or gangrene   • Status post total hip replacement, right       Review of Systems   Constitutional: Negative.    HENT: Negative.    Eyes: Negative.    Respiratory: Negative.    Cardiovascular: Negative.    Gastrointestinal: Negative.    Endocrine: Negative.    Genitourinary: Negative.    Musculoskeletal: Negative.    Skin: Negative.    Allergic/Immunologic: Negative.    Neurological: Negative.    Hematological: Negative.    Psychiatric/Behavioral: Negative.    All other systems reviewed and are negative.      Physical Exam  Vitals and nursing note reviewed.   Constitutional:       Appearance: Normal appearance. He is well-developed. He is obese.   HENT:      Head: Normocephalic and atraumatic.      Right Ear: Tympanic membrane, ear canal and external ear normal.      Left Ear: Tympanic membrane, ear canal and external ear normal.      Nose: Nose normal.   Eyes:      Conjunctiva/sclera: Conjunctivae normal.      Pupils: Pupils are equal, round, and reactive to light.   Neck:      Thyroid: No thyromegaly.   Cardiovascular:      Rate and Rhythm: Normal rate and regular  rhythm.      Heart sounds: Normal heart sounds.   Pulmonary:      Effort: Pulmonary effort is normal.      Breath sounds: Normal breath sounds.   Abdominal:      General: Bowel sounds are normal.      Palpations: Abdomen is soft.   Musculoskeletal:         General: Normal range of motion.      Cervical back: Normal range of motion and neck supple.   Skin:     General: Skin is warm and dry.   Neurological:      Mental Status: He is alert and oriented to person, place, and time.      Deep Tendon Reflexes: Reflexes are normal and symmetric.   Psychiatric:         Mood and Affect: Mood normal.         Behavior: Behavior normal.         Thought Content: Thought content normal.         Judgment: Judgment normal.         All  tests have been reviewed.    Assessment & Plan          1. Patient Counseling:  --Nutrition: Stressed importance of moderation in sodium/caffeine intake, saturated fat and cholesterol, caloric balance, sufficient intake of fresh fruits, vegetables, fiber, calcium and iron.  --Exercise: Stressed the importance of regular exercise.   --Injury prevention: Discussed safety belts, safety helmets, smoke detector, smoking near bedding or upholstery.   --Dental health: Discussed importance of regular tooth brushing, flossing, and dental visits.  --Immunizations reviewed.  --Discussed benefits of screening colonoscopy.  --After hours service discussed with patient    2. Discussed the patient's BMI with him.    Body mass index is 38.27 kg/m².  Class 2 Severe Obesity (BMI >=35 and <=39.9). Obesity-related health conditions include the following: hypertension. Obesity is improving with treatment. BMI is is above average; BMI management plan is completed. We discussed portion control and increasing exercise.

## 2023-04-08 LAB
25(OH)D3+25(OH)D2 SERPL-MCNC: 45 NG/ML (ref 30–100)
ALBUMIN SERPL-MCNC: 4.1 G/DL (ref 3.5–5.2)
ALBUMIN/GLOB SERPL: 1.1 G/DL
ALP SERPL-CCNC: 111 U/L (ref 39–117)
ALT SERPL-CCNC: 27 U/L (ref 1–41)
AST SERPL-CCNC: 17 U/L (ref 1–40)
BASOPHILS # BLD AUTO: 0.04 10*3/MM3 (ref 0–0.2)
BASOPHILS NFR BLD AUTO: 0.4 % (ref 0–1.5)
BILIRUB SERPL-MCNC: 0.8 MG/DL (ref 0–1.2)
BUN SERPL-MCNC: 22 MG/DL (ref 8–23)
BUN/CREAT SERPL: 21 (ref 7–25)
CALCIUM SERPL-MCNC: 9.8 MG/DL (ref 8.6–10.5)
CHLORIDE SERPL-SCNC: 98 MMOL/L (ref 98–107)
CHOLEST SERPL-MCNC: 121 MG/DL (ref 0–200)
CK SERPL-CCNC: 138 U/L (ref 20–200)
CO2 SERPL-SCNC: 25.9 MMOL/L (ref 22–29)
CREAT SERPL-MCNC: 1.05 MG/DL (ref 0.76–1.27)
EGFRCR SERPLBLD CKD-EPI 2021: 74 ML/MIN/1.73
EOSINOPHIL # BLD AUTO: 0.17 10*3/MM3 (ref 0–0.4)
EOSINOPHIL NFR BLD AUTO: 1.9 % (ref 0.3–6.2)
ERYTHROCYTE [DISTWIDTH] IN BLOOD BY AUTOMATED COUNT: 13.4 % (ref 12.3–15.4)
GLOBULIN SER CALC-MCNC: 3.8 GM/DL
GLUCOSE SERPL-MCNC: 118 MG/DL (ref 65–99)
HBA1C MFR BLD: 6.9 % (ref 4.8–5.6)
HCT VFR BLD AUTO: 45 % (ref 37.5–51)
HDLC SERPL-MCNC: 36 MG/DL (ref 40–60)
HGB BLD-MCNC: 15.2 G/DL (ref 13–17.7)
IMM GRANULOCYTES # BLD AUTO: 0.04 10*3/MM3 (ref 0–0.05)
IMM GRANULOCYTES NFR BLD AUTO: 0.4 % (ref 0–0.5)
LDLC SERPL CALC-MCNC: 65 MG/DL (ref 0–100)
LYMPHOCYTES # BLD AUTO: 3.04 10*3/MM3 (ref 0.7–3.1)
LYMPHOCYTES NFR BLD AUTO: 33.3 % (ref 19.6–45.3)
MCH RBC QN AUTO: 30.6 PG (ref 26.6–33)
MCHC RBC AUTO-ENTMCNC: 33.8 G/DL (ref 31.5–35.7)
MCV RBC AUTO: 90.5 FL (ref 79–97)
MONOCYTES # BLD AUTO: 0.61 10*3/MM3 (ref 0.1–0.9)
MONOCYTES NFR BLD AUTO: 6.7 % (ref 5–12)
NEUTROPHILS # BLD AUTO: 5.22 10*3/MM3 (ref 1.7–7)
NEUTROPHILS NFR BLD AUTO: 57.3 % (ref 42.7–76)
NRBC BLD AUTO-RTO: 0 /100 WBC (ref 0–0.2)
PLATELET # BLD AUTO: 332 10*3/MM3 (ref 140–450)
POTASSIUM SERPL-SCNC: 4.5 MMOL/L (ref 3.5–5.2)
PROT SERPL-MCNC: 7.9 G/DL (ref 6–8.5)
RBC # BLD AUTO: 4.97 10*6/MM3 (ref 4.14–5.8)
SODIUM SERPL-SCNC: 138 MMOL/L (ref 136–145)
TRIGL SERPL-MCNC: 109 MG/DL (ref 0–150)
TSH SERPL DL<=0.005 MIU/L-ACNC: 1.78 UIU/ML (ref 0.27–4.2)
UNABLE TO VOID: NORMAL
URATE SERPL-MCNC: 6.5 MG/DL (ref 3.4–7)
VLDLC SERPL CALC-MCNC: 20 MG/DL (ref 5–40)
WBC # BLD AUTO: 9.12 10*3/MM3 (ref 3.4–10.8)

## 2023-05-01 DIAGNOSIS — I10 ESSENTIAL HYPERTENSION: ICD-10-CM

## 2023-05-01 RX ORDER — DULOXETIN HYDROCHLORIDE 30 MG/1
CAPSULE, DELAYED RELEASE ORAL
Qty: 90 CAPSULE | Refills: 3 | Status: SHIPPED | OUTPATIENT
Start: 2023-05-01

## 2023-05-01 RX ORDER — AMLODIPINE BESYLATE 5 MG/1
TABLET ORAL
Qty: 90 TABLET | Refills: 3 | Status: SHIPPED | OUTPATIENT
Start: 2023-05-01

## 2023-05-25 RX ORDER — DOXAZOSIN 2 MG/1
TABLET ORAL
Qty: 90 TABLET | Refills: 3 | Status: SHIPPED | OUTPATIENT
Start: 2023-05-25

## 2023-07-25 ENCOUNTER — OFFICE VISIT (OUTPATIENT)
Dept: ORTHOPEDIC SURGERY | Facility: CLINIC | Age: 78
End: 2023-07-25
Payer: MEDICARE

## 2023-07-25 VITALS
DIASTOLIC BLOOD PRESSURE: 74 MMHG | SYSTOLIC BLOOD PRESSURE: 163 MMHG | BODY MASS INDEX: 35.34 KG/M2 | HEIGHT: 68 IN | WEIGHT: 233.2 LBS | HEART RATE: 68 BPM

## 2023-07-25 DIAGNOSIS — Z96.641 STATUS POST TOTAL HIP REPLACEMENT, RIGHT: Primary | ICD-10-CM

## 2023-07-25 DIAGNOSIS — M25.551 ARTHRALGIA OF RIGHT HIP: ICD-10-CM

## 2023-07-25 PROCEDURE — 1160F RVW MEDS BY RX/DR IN RCRD: CPT | Performed by: PHYSICIAN ASSISTANT

## 2023-07-25 PROCEDURE — 3078F DIAST BP <80 MM HG: CPT | Performed by: PHYSICIAN ASSISTANT

## 2023-07-25 PROCEDURE — 99213 OFFICE O/P EST LOW 20 MIN: CPT | Performed by: PHYSICIAN ASSISTANT

## 2023-07-25 PROCEDURE — 3077F SYST BP >= 140 MM HG: CPT | Performed by: PHYSICIAN ASSISTANT

## 2023-07-25 PROCEDURE — 1159F MED LIST DOCD IN RCRD: CPT | Performed by: PHYSICIAN ASSISTANT

## 2023-07-25 NOTE — PROGRESS NOTES
Subjective   Patient ID: John Leyva is a 78 y.o. right hand dominant male  Pain of the Right Hip (LASHELL 4/13/21, no new injury, reports sudden pain since 7/21/23)         History of Present Illness  Patient presents to the clinic with complaints of right posterior lateral hip pain that began 7/21/2023.  No injury or trauma.  He states he has to position his back and hip in a certain way to alleviate the discomfort.  He denies numbness or tingling to the lower extremity.  No bowel or bladder incontinence.                                                 Past Medical History:   Diagnosis Date    Arthritis     Bell's palsy 10/19/2022    1974    BPH (benign prostatic hyperplasia)     Cataract, bilateral     s/p surgery    Diabetes mellitus     no medications     ED (erectile dysfunction)     Elevated cholesterol     Elevated PSA     Gout     Hypertension     Hypogonadism in male     Hypothyroidism     no medications    Impaired functional mobility, balance, gait, and endurance     Low kidney function     Retinal detachment     Wears glasses         Past Surgical History:   Procedure Laterality Date    APPENDECTOMY      COLONOSCOPY      over 5 years ago  Dr Yen    COLONOSCOPY N/A 10/20/2022    Procedure: COLONOSCOPY with polypectomy;  Surgeon: Nii Longoria MD;  Location: [facility] ENDOSCOPY;  Service: Gastroenterology;  Laterality: N/A;    ELBOW OLECRANNON BURSA EXCISION Right     EYE SURGERY Bilateral     cataracts removed    LUMBAR LAMINECTOMY DISCECTOMY DECOMPRESSION Right 06/04/2020    Procedure: LUMBAR LAMINECTOMY L4-5, HEMILAMINECTOMY L3-4 RIGHT;  Surgeon: Paulie Daniels MD;  Location: [facility] OR;  Service: Neurosurgery;  Laterality: Right;    RETINAL DETACHMENT SURGERY Right     x3 surgeries    SKIN BIOPSY      benign    TOTAL HIP ARTHROPLASTY Right 04/13/2021    Procedure: Elective total hip arthroplasty;  Surgeon: Jose Carlson MD;  Location: [facility] OR;  Service: Orthopedics;  Laterality: Right;        Family History   Problem Relation Age of Onset    Cancer Mother     Cancer Father         Bone cancer    Cancer Other     Colon cancer Neg Hx     Liver cancer Neg Hx     Rectal cancer Neg Hx     Stomach cancer Neg Hx        Social History     Socioeconomic History    Marital status:      Spouse name: Yolanda   Tobacco Use    Smoking status: Former     Packs/day: 3.00     Years: 10.00     Pack years: 30.00     Types: Cigarettes     Quit date:      Years since quittin.5    Smokeless tobacco: Never   Vaping Use    Vaping Use: Never used   Substance and Sexual Activity    Alcohol use: Not Currently    Drug use: No    Sexual activity: Defer         Current Outpatient Medications:     allopurinol (ZYLOPRIM) 100 MG tablet, TAKE ONE TABLET BY MOUTH EVERY DAY, Disp: 90 tablet, Rfl: 3    amLODIPine (NORVASC) 5 MG tablet, TAKE ONE TABLET BY MOUTH EVERY DAY, Disp: 90 tablet, Rfl: 3    atorvastatin (LIPITOR) 20 MG tablet, TAKE ONE TABLET BY MOUTH EVERY DAY (Patient taking differently: Take 1 tablet by mouth Daily.), Disp: 90 tablet, Rfl: 3    Cholecalciferol (Vitamin D3) 50 MCG (2000 UT) tablet, Take 1 tablet by mouth Daily., Disp: , Rfl:     doxazosin (CARDURA) 2 MG tablet, TAKE ONE TABLET BY MOUTH AT BEDTIME, Disp: 90 tablet, Rfl: 3    DULoxetine (CYMBALTA) 30 MG capsule, TAKE ONE CAPSULE BY MOUTH EVERY DAY, Disp: 90 capsule, Rfl: 3    lisinopril-hydrochlorothiazide (PRINZIDE,ZESTORETIC) 10-12.5 MG per tablet, TAKE ONE TABLET BY MOUTH DAILY, Disp: 90 tablet, Rfl: 3    metoprolol succinate XL (TOPROL-XL) 50 MG 24 hr tablet, TAKE ONE TABLET BY MOUTH EVERY DAY, Disp: 90 tablet, Rfl: 3    Multiple Vitamins-Minerals (MULTIVITAMIN ADULT PO), Take 1 tablet by mouth Daily., Disp: , Rfl:     No Known Allergies    Review of Systems   Musculoskeletal:  Positive for arthralgias (right hip) and back pain.     I have reviewed the medical and surgical history, family history, social history, medications, and/or allergies,  "and the review of systems of this report.    Objective   /74 (BP Location: Left arm, Patient Position: Sitting, Cuff Size: Adult)   Pulse 68   Ht 171.5 cm (67.5\")   Wt 106 kg (233 lb 3.2 oz)   BMI 35.99 kg/m²    Physical Exam  Vitals and nursing note reviewed.   Constitutional:       Appearance: Normal appearance.   Pulmonary:      Effort: Pulmonary effort is normal.   Musculoskeletal:      Lumbar back: Tenderness present. No bony tenderness.        Back:    Neurological:      Mental Status: He is alert and oriented to person, place, and time.     Right Hip Exam     Tenderness   The patient is experiencing tenderness in the lateral and posterior.    Range of Motion   Abduction:  35   Adduction:  20   Flexion:  90     Tests   JANY: negative  Fadir:  Negative FADIR test         Extremity DVT signs are negative by clinical screen.   Neurologic Exam     Mental Status   Oriented to person, place, and time.            Assessment & Plan   Independent Review of Radiographic Studies:    AP and lateral of the right hip, indication to evaluate status of prosthesis and joint, and compared with prior imaging, shows no acute fracture or dislocation. Good position and alignment of prosthesis with no radiographic signs of loosening.       Procedures       Diagnoses and all orders for this visit:    1. Status post total hip replacement, right (Primary)  -     XR Hip With or Without Pelvis 2 - 3 View Right; Future  -     NM Bone Scan 3 Phase; Future    2. Arthralgia of right hip  -     XR Hip With or Without Pelvis 2 - 3 View Right; Future  -     NM Bone Scan 3 Phase; Future       Reduced physical activity as appropriate  Ice, heat, and/or modalities as beneficial    Recommendations/Plan:  Patient is encouraged to call or return for any issues or concerns.  I would like to order a three-phase bone scan and if within normal limits, he may need a spine subspecialty referral.  Follow up after 3pbs  Patient agreeable to call " or return sooner for any concerns.                 EMR Dragon-transcription disclaimer:  This encounter note is an electronic transcription of spoken language to printed text.  Electronic transcription of spoken language may permit erroneous or at times nonsensical words or phrases to be inadvertently transcribed.  Although I have reviewed the note for such errors, some may still exist

## 2023-07-26 ENCOUNTER — TELEPHONE (OUTPATIENT)
Dept: ORTHOPEDIC SURGERY | Facility: CLINIC | Age: 78
End: 2023-07-26
Payer: MEDICARE

## 2023-07-26 DIAGNOSIS — M46.1 SACROILIAC INFLAMMATION: Primary | ICD-10-CM

## 2023-07-26 DIAGNOSIS — M70.61 GREATER TROCHANTERIC BURSITIS OF RIGHT HIP: ICD-10-CM

## 2023-07-26 RX ORDER — CELECOXIB 100 MG/1
100 CAPSULE ORAL 2 TIMES DAILY
Qty: 14 CAPSULE | Refills: 0 | Status: SHIPPED | OUTPATIENT
Start: 2023-07-26

## 2023-07-26 RX ORDER — CYCLOBENZAPRINE HCL 5 MG
5 TABLET ORAL NIGHTLY PRN
Qty: 14 TABLET | Refills: 0 | Status: SHIPPED | OUTPATIENT
Start: 2023-07-26

## 2023-08-11 ENCOUNTER — HOSPITAL ENCOUNTER (OUTPATIENT)
Dept: NUCLEAR MEDICINE | Facility: HOSPITAL | Age: 78
Discharge: HOME | End: 2023-08-11
Payer: MEDICARE

## 2023-08-11 DIAGNOSIS — Z96.641 STATUS POST TOTAL HIP REPLACEMENT, RIGHT: ICD-10-CM

## 2023-08-11 DIAGNOSIS — M25.551 ARTHRALGIA OF RIGHT HIP: ICD-10-CM

## 2023-08-11 PROCEDURE — 78315 BONE IMAGING 3 PHASE: CPT

## 2023-08-11 PROCEDURE — 0 TECHNETIUM MEDRONATE KIT: Performed by: PHYSICIAN ASSISTANT

## 2023-08-11 PROCEDURE — A9503 TC99M MEDRONATE: HCPCS | Performed by: PHYSICIAN ASSISTANT

## 2023-08-11 RX ORDER — TC 99M MEDRONATE 20 MG/10ML
27.4 INJECTION, POWDER, LYOPHILIZED, FOR SOLUTION INTRAVENOUS
Status: COMPLETED | OUTPATIENT
Start: 2023-08-11 | End: 2023-08-11

## 2023-08-11 RX ADMIN — TC 99M MEDRONATE 27.4 MILLICURIE: 20 INJECTION, POWDER, LYOPHILIZED, FOR SOLUTION INTRAVENOUS at 11:10

## 2023-09-08 ENCOUNTER — OFFICE VISIT (OUTPATIENT)
Dept: ORTHOPEDIC SURGERY | Facility: CLINIC | Age: 78
End: 2023-09-08
Payer: MEDICARE

## 2023-09-08 VITALS
BODY MASS INDEX: 35.68 KG/M2 | DIASTOLIC BLOOD PRESSURE: 80 MMHG | TEMPERATURE: 98.2 F | WEIGHT: 235.4 LBS | SYSTOLIC BLOOD PRESSURE: 122 MMHG | HEIGHT: 68 IN

## 2023-09-08 DIAGNOSIS — M51.36 LUMBAR DEGENERATIVE DISC DISEASE: ICD-10-CM

## 2023-09-08 DIAGNOSIS — M62.561 RIGHT CALF ATROPHY: ICD-10-CM

## 2023-09-08 DIAGNOSIS — Z96.641 STATUS POST TOTAL HIP REPLACEMENT, RIGHT: Primary | ICD-10-CM

## 2023-09-08 NOTE — PROGRESS NOTES
Subjective   Patient ID: John Leyva is a 78 y.o. right hand dominant male is here today for orthopaedic evaluation of recovery progress with treatment.  Follow-up and Results of the Right Hip (Go over bone scan results.)       CHIEF COMPLAINT:    Progress with treatment.   Review of three phase bone scan imaging study.    History of Present Illness     Progress Note:  Patient tells me his right sided lower back pain has subsided and essentially resolved.  His right hip mobility and strength are good, and independent ambulation is stable.  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  He does notice chronic gradual right calf muscle atrophy that is not new. No bowel or bladder changes and no radiating pain down the legs.  He does have a history of lumbar DDD involving the right side L3-4 and L4-5 nerve roots with prior right lumbar laminectomy at these levels.  He plans to follow up with his lumbar spine surgeon Dr. Jeremiah MD.      The patient is not currently taking pain medication .   Physical therapy has not been ordered.  Injection therapy has not been given.  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      We reviewed his three phase bone scan that is stable with no signs of any loosening, infection or concern with hiis right total hip replacment.  Reviewed the other bone scan findings, including very mild uptake of the right lower lumbar spine, four right sided rib mild uptake spots in a linear pattern consistent with old healed rib fractures, one similar spot at a left sided rib consistent with old injury, and uptake at the right shoulder consistent with arthritis.  Patient has no rib pain or symptoms, and notes occasional right shoulder pain and cannot elevate his right shoulder fully as he can on the left and consistent with right shoulder arthritis.  Patient expressed appreciation for the review of the bone  scan and his questions answered.     Past Medical History:   Diagnosis Date    Arthritis     Bell's palsy 10/19/2022    1974    BPH (benign prostatic hyperplasia)     Cataract, bilateral     s/p surgery    Diabetes mellitus     no medications     ED (erectile dysfunction)     Elevated cholesterol     Elevated PSA     Gout     Hypertension     Hypogonadism in male     Hypothyroidism     no medications    Impaired functional mobility, balance, gait, and endurance     Low kidney function     Retinal detachment     Wears glasses         Past Surgical History:   Procedure Laterality Date    APPENDECTOMY      COLONOSCOPY      over 5 years ago  Dr Yen    COLONOSCOPY N/A 10/20/2022    Procedure: COLONOSCOPY with polypectomy;  Surgeon: Nii Longoria MD;  Location: [facility] ENDOSCOPY;  Service: Gastroenterology;  Laterality: N/A;    ELBOW OLECRANNON BURSA EXCISION Right     EYE SURGERY Bilateral     cataracts removed    LUMBAR LAMINECTOMY DISCECTOMY DECOMPRESSION Right 2020    Procedure: LUMBAR LAMINECTOMY L4-5, HEMILAMINECTOMY L3-4 RIGHT;  Surgeon: Paulie Daniels MD;  Location: [facility] OR;  Service: Neurosurgery;  Laterality: Right;    RETINAL DETACHMENT SURGERY Right     x3 surgeries    SKIN BIOPSY      benign    TOTAL HIP ARTHROPLASTY Right 2021    Procedure: Elective total hip arthroplasty;  Surgeon: Jose Carlson MD;  Location: [facility] OR;  Service: Orthopedics;  Laterality: Right;        Family History   Problem Relation Age of Onset    Cancer Mother     Cancer Father         Bone cancer    Cancer Other     Colon cancer Neg Hx     Liver cancer Neg Hx     Rectal cancer Neg Hx     Stomach cancer Neg Hx         Social History     Socioeconomic History    Marital status:      Spouse name: Yolanda   Tobacco Use    Smoking status: Former     Packs/day: 3.00     Years: 10.00     Pack years: 30.00     Types: Cigarettes     Quit date:      Years since quittin.7    Smokeless tobacco:  "Never   Vaping Use    Vaping Use: Never used   Substance and Sexual Activity    Alcohol use: Not Currently    Drug use: No    Sexual activity: Defer       No Known Allergies    Review of Systems   Constitutional:  Negative for fever.   Musculoskeletal:  Positive for arthralgias and back pain (occasional). Negative for gait problem and joint swelling.   Skin:  Negative for color change and rash.   Psychiatric/Behavioral:  Negative for confusion.      I have reviewed the medical and surgical history, family history, social history, medications, and/or allergies, and the review of systems of this report.    Objective   /80   Temp 98.2 °F (36.8 °C)   Ht 171.5 cm (67.5\")   Wt 107 kg (235 lb 6.4 oz)   BMI 36.32 kg/m²   Physical Exam  Ortho Exam  Painless right hip flexion and rotation.  Negative JANY and FADIR.  Normal ambulation with no Trendelenburg sign.  Right calf muscle atrophy.   Neurologic Exam    Assessment & Plan     Independent Review of Radiographic Studies:    Reviewed three phase bone scan images in detail with patient and as noted above, and agree with radiologist interpretation with the addition of right shoulder arthritis changes on the bone scan.    Laboratory and Other Studies:  No new results reviewed today.     Medical Decision Making:    Good progress, significantly improved.  Continue current treatment plan.    Procedures    Diagnoses and all orders for this visit:    1. Status post total hip replacement, right (Primary)    2. Lumbar degenerative disc disease    3. Right calf atrophy       Discussion of orthopaedic goals and activities and patient expressed appreciation.  Regular exercise as tolerated  Guided on proper techniques for mobility and conditioning exercises    Recommendations/Plan:  Exercise, medications, other patient advice, and return appointment as noted.  Brace: No brace was given at today's visit.  Referral: Patient plans spine sub-specialist follow-up with Dr. Apodaca " MD Jeremiah  Test/Studies: No additional studies ordered at this time.  Work/Activity Status: Usual activities, routine exercise as tolerated, routine physical work as tolerated. no strenuous activity.    Return for Annual recheck, XOA pelvis and right hip.  Patient is encouraged and agreeable to call or return sooner for any issues or concerns.

## 2023-10-18 RX ORDER — ATORVASTATIN CALCIUM 20 MG/1
TABLET, FILM COATED ORAL
Qty: 90 TABLET | Refills: 3 | OUTPATIENT
Start: 2023-10-18

## 2023-10-23 RX ORDER — ATORVASTATIN CALCIUM 20 MG/1
20 TABLET, FILM COATED ORAL DAILY
Qty: 30 TABLET | Refills: 0 | Status: SHIPPED | OUTPATIENT
Start: 2023-10-23

## 2023-10-23 NOTE — TELEPHONE ENCOUNTER
Incoming Refill Request      Medication requested (name and dose): LIPITOR 20MG    Pharmacy where request should be sent: [facility]    Additional details provided by patient: HE IS OUT OF THIS RX    Best call back number: [phone]    Does the patient have less than a 3 day supply:  [x] Yes  [] No    Martin Stone Rep  10/23/23, 10:47 EDT

## 2023-11-01 ENCOUNTER — OFFICE VISIT (OUTPATIENT)
Dept: INTERNAL MEDICINE | Facility: CLINIC | Age: 78
End: 2023-11-01
Payer: MEDICARE

## 2023-11-01 VITALS
SYSTOLIC BLOOD PRESSURE: 136 MMHG | HEART RATE: 62 BPM | OXYGEN SATURATION: 97 % | RESPIRATION RATE: 16 BRPM | WEIGHT: 237 LBS | DIASTOLIC BLOOD PRESSURE: 71 MMHG | HEIGHT: 68 IN | BODY MASS INDEX: 35.92 KG/M2

## 2023-11-01 DIAGNOSIS — I10 ESSENTIAL HYPERTENSION: Primary | ICD-10-CM

## 2023-11-01 DIAGNOSIS — E55.9 VITAMIN D DEFICIENCY: ICD-10-CM

## 2023-11-01 DIAGNOSIS — M10.00 IDIOPATHIC GOUT, UNSPECIFIED CHRONICITY, UNSPECIFIED SITE: ICD-10-CM

## 2023-11-01 DIAGNOSIS — E11.65 TYPE 2 DIABETES MELLITUS WITH HYPERGLYCEMIA, WITHOUT LONG-TERM CURRENT USE OF INSULIN: ICD-10-CM

## 2023-11-01 DIAGNOSIS — E78.2 MIXED HYPERLIPIDEMIA: ICD-10-CM

## 2023-11-01 DIAGNOSIS — N18.2 CKD (CHRONIC KIDNEY DISEASE) STAGE 2, GFR 60-89 ML/MIN: ICD-10-CM

## 2023-11-01 DIAGNOSIS — M16.11 PRIMARY OSTEOARTHRITIS OF RIGHT HIP: ICD-10-CM

## 2023-11-01 DIAGNOSIS — N40.0 BENIGN NON-NODULAR PROSTATIC HYPERPLASIA: ICD-10-CM

## 2023-11-01 LAB
EXPIRATION DATE: ABNORMAL
HBA1C MFR BLD: 6.3 % (ref 4.5–5.7)
Lab: ABNORMAL

## 2023-11-01 RX ORDER — FINASTERIDE 5 MG/1
1 TABLET, FILM COATED ORAL DAILY
COMMUNITY

## 2023-11-01 RX ORDER — TRIAMCINOLONE ACETONIDE 1 MG/G
1 CREAM TOPICAL 2 TIMES DAILY
COMMUNITY
Start: 2023-10-09 | End: 2023-11-01

## 2023-11-01 NOTE — ASSESSMENT & PLAN NOTE
Stable on current medication and dosage. Will continue current management. Refill medication as necessary.  Lisinopril-hctz, amlodipine

## 2023-11-01 NOTE — ASSESSMENT & PLAN NOTE
Stable on current medication and dosage. Will continue current management. Refill medication as necessary.  atorvastatin

## 2023-11-01 NOTE — PROGRESS NOTES
Office Note     Name: John Leyva    : 1945     MRN: [id number]     Chief Complaint  Establish Care (Offboarding Dr. Almanza/)    Subjective     History of Present Illness:  John Leyva is a 78 y.o. male who presents today for chronic conditions.     HTN on lisinopril-hctz and metoprolol  HLD on statin, last labs reviewed, normal LDL  Hyperglycemia:  Last A1c       Lab 23  0814   HEMOGLOBIN A1C 6.3*     Screen for nephropathy:   Lab Results   Component Value Date    CREATININE 1.05 2023    BUN 22 2023     2023    K 4.5 2023    CL 98 2023    CO2 25.9 2023       BPH asymptomatic, not on meds  Gout: on allopurinol, stable at this time, no flare ups for 8-10 years now  CKD 2: Follows with nephrology    Colonoscopy:  polyps, diverticulosis, repeat in 7 years  AAA screen normal on 2017    Family History:   Family History   Problem Relation Age of Onset    Cancer Mother     Cancer Father         Bone cancer    Cancer Other     Colon cancer Neg Hx     Liver cancer Neg Hx     Rectal cancer Neg Hx     Stomach cancer Neg Hx        Social History:   Social History     Socioeconomic History    Marital status:      Spouse name: Yolanda   Tobacco Use    Smoking status: Former     Packs/day: 3.00     Years: 10.00     Additional pack years: 0.00     Total pack years: 30.00     Types: Cigarettes     Quit date:      Years since quittin.8    Smokeless tobacco: Never   Vaping Use    Vaping Use: Never used   Substance and Sexual Activity    Alcohol use: Not Currently    Drug use: No    Sexual activity: Defer       Health Maintenance   Topic Date Due    URINE MICROALBUMIN  2023    DIABETIC EYE EXAM  2023 (Originally 10/16/2020)    ZOSTER VACCINE (1 of 2) 10/01/2025 (Originally 1997)    ANNUAL WELLNESS VISIT  2024    BMI FOLLOWUP  2024    LIPID PANEL  2024    HEMOGLOBIN A1C  2024    TDAP/TD VACCINES (2 - Td or Tdap) 2027  "   COLORECTAL CANCER SCREENING  10/20/2029    HEPATITIS C SCREENING  Completed    COVID-19 Vaccine  Completed    INFLUENZA VACCINE  Completed    Pneumococcal Vaccine 65+  Completed       Objective     Vital Signs  /71   Pulse 62   Resp 16   Ht 171.5 cm (67.52\")   Wt 108 kg (237 lb)   SpO2 97%   BMI 36.55 kg/m²   Estimated body mass index is 36.55 kg/m² as calculated from the following:    Height as of this encounter: 171.5 cm (67.52\").    Weight as of this encounter: 108 kg (237 lb).        Physical Exam  Vitals and nursing note reviewed.   Constitutional:       Appearance: Normal appearance.   HENT:      Head: Normocephalic and atraumatic.   Cardiovascular:      Rate and Rhythm: Normal rate and regular rhythm.      Pulses: Normal pulses.      Heart sounds: Normal heart sounds.   Pulmonary:      Effort: Pulmonary effort is normal. No respiratory distress.      Breath sounds: Normal breath sounds. No wheezing, rhonchi or rales.   Abdominal:      General: Abdomen is flat. Bowel sounds are normal.      Palpations: Abdomen is soft.      Tenderness: There is no abdominal tenderness. There is no guarding.   Musculoskeletal:      Cervical back: Neck supple.   Skin:     General: Skin is warm.      Capillary Refill: Capillary refill takes less than 2 seconds.   Neurological:      General: No focal deficit present.      Mental Status: He is alert. Mental status is at baseline.   Psychiatric:         Mood and Affect: Mood normal.         Behavior: Behavior normal.          Procedures     Assessment and Plan     Diagnoses and all orders for this visit:    1. Essential hypertension (Primary)  Assessment & Plan:  Stable on current medication and dosage. Will continue current management. Refill medication as necessary.  Lisinopril-hctz, amlodipine    Orders:  -     CBC & Differential  -     Comprehensive Metabolic Panel  -     TSH Rfx On Abnormal To Free T4    2. Mixed hyperlipidemia  Assessment & Plan:  Stable on " current medication and dosage. Will continue current management. Refill medication as necessary.  atorvastatin    Orders:  -     Lipid Panel    3. Type 2 diabetes mellitus with hyperglycemia, without long-term current use of insulin  Assessment & Plan:  Improving, not on medications, diet controlled  A1c today 6.3    Orders:  -     POC Glycosylated Hemoglobin (Hb A1C)  -     MicroAlbumin, Urine, Random - Urine, Clean Catch    4. Vitamin D deficiency  Assessment & Plan:  Discontinued vitamin D3 2000 units daily  Will monitor    Orders:  -     Vitamin D,25-Hydroxy    5. Benign non-nodular prostatic hyperplasia  Assessment & Plan:  Asymptomatic, not on medications, stable      6. Idiopathic gout, unspecified chronicity, unspecified site  Assessment & Plan:  Stable on current medication and dosage. Will continue current management. Refill medication as necessary.  Allopurinol  Monitor uric acid    Orders:  -     Uric acid    7. Primary osteoarthritis of right hip  Assessment & Plan:  S/p hip replacement  Stable at this time      8. CKD (chronic kidney disease) stage 2, GFR 60-89 ml/min  Assessment & Plan:  Follows with nephrology  Stable for now  Advised to avoid NSAIDs and nephrotoxic medications  Will monitor           Counseling was given to patient for the following topics: instructions for management and risks and benefits of treatment options.    Follow Up  Return in about 23 weeks (around 4/10/2024) for Medicare Wellness.    MD KEVIN Lin  [facility]  [address] [place] [id number] [phone]

## 2023-11-01 NOTE — ASSESSMENT & PLAN NOTE
Follows with nephrology  Stable for now  Advised to avoid NSAIDs and nephrotoxic medications  Will monitor

## 2023-11-01 NOTE — ASSESSMENT & PLAN NOTE
Stable on current medication and dosage. Will continue current management. Refill medication as necessary.  Allopurinol  Monitor uric acid

## 2023-11-02 LAB
25(OH)D3+25(OH)D2 SERPL-MCNC: 47.6 NG/ML (ref 30–100)
ALBUMIN SERPL-MCNC: 4.2 G/DL (ref 3.5–5.2)
ALBUMIN/GLOB SERPL: 1.3 G/DL
ALP SERPL-CCNC: 114 U/L (ref 39–117)
ALT SERPL-CCNC: 22 U/L (ref 1–41)
AST SERPL-CCNC: 24 U/L (ref 1–40)
BASOPHILS # BLD AUTO: 0.02 10*3/MM3 (ref 0–0.2)
BASOPHILS NFR BLD AUTO: 0.2 % (ref 0–1.5)
BILIRUB SERPL-MCNC: 1 MG/DL (ref 0–1.2)
BUN SERPL-MCNC: 15 MG/DL (ref 8–23)
BUN/CREAT SERPL: 13.8 (ref 7–25)
CALCIUM SERPL-MCNC: 9.8 MG/DL (ref 8.6–10.5)
CHLORIDE SERPL-SCNC: 102 MMOL/L (ref 98–107)
CHOLEST SERPL-MCNC: 153 MG/DL (ref 0–200)
CO2 SERPL-SCNC: 27.1 MMOL/L (ref 22–29)
CREAT SERPL-MCNC: 1.09 MG/DL (ref 0.76–1.27)
EGFRCR SERPLBLD CKD-EPI 2021: 70.3 ML/MIN/1.73
EOSINOPHIL # BLD AUTO: 0.14 10*3/MM3 (ref 0–0.4)
EOSINOPHIL NFR BLD AUTO: 1.7 % (ref 0.3–6.2)
ERYTHROCYTE [DISTWIDTH] IN BLOOD BY AUTOMATED COUNT: 13.7 % (ref 12.3–15.4)
GLOBULIN SER CALC-MCNC: 3.3 GM/DL
GLUCOSE SERPL-MCNC: 135 MG/DL (ref 65–99)
HCT VFR BLD AUTO: 48.7 % (ref 37.5–51)
HDLC SERPL-MCNC: 38 MG/DL (ref 40–60)
HGB BLD-MCNC: 16.3 G/DL (ref 13–17.7)
IMM GRANULOCYTES # BLD AUTO: 0.02 10*3/MM3 (ref 0–0.05)
IMM GRANULOCYTES NFR BLD AUTO: 0.2 % (ref 0–0.5)
LDLC SERPL CALC-MCNC: 94 MG/DL (ref 0–100)
LYMPHOCYTES # BLD AUTO: 2.68 10*3/MM3 (ref 0.7–3.1)
LYMPHOCYTES NFR BLD AUTO: 32.1 % (ref 19.6–45.3)
MCH RBC QN AUTO: 31.1 PG (ref 26.6–33)
MCHC RBC AUTO-ENTMCNC: 33.5 G/DL (ref 31.5–35.7)
MCV RBC AUTO: 92.9 FL (ref 79–97)
MICROALBUMIN UR-MCNC: 10 UG/ML
MONOCYTES # BLD AUTO: 0.56 10*3/MM3 (ref 0.1–0.9)
MONOCYTES NFR BLD AUTO: 6.7 % (ref 5–12)
NEUTROPHILS # BLD AUTO: 4.94 10*3/MM3 (ref 1.7–7)
NEUTROPHILS NFR BLD AUTO: 59.1 % (ref 42.7–76)
NRBC BLD AUTO-RTO: 0 /100 WBC (ref 0–0.2)
PLATELET # BLD AUTO: 264 10*3/MM3 (ref 140–450)
POTASSIUM SERPL-SCNC: 4.5 MMOL/L (ref 3.5–5.2)
PROT SERPL-MCNC: 7.5 G/DL (ref 6–8.5)
RBC # BLD AUTO: 5.24 10*6/MM3 (ref 4.14–5.8)
SODIUM SERPL-SCNC: 138 MMOL/L (ref 136–145)
TRIGL SERPL-MCNC: 118 MG/DL (ref 0–150)
TSH SERPL DL<=0.005 MIU/L-ACNC: 2.99 UIU/ML (ref 0.27–4.2)
URATE SERPL-MCNC: 7.1 MG/DL (ref 3.4–7)
VLDLC SERPL CALC-MCNC: 21 MG/DL (ref 5–40)
WBC # BLD AUTO: 8.36 10*3/MM3 (ref 3.4–10.8)

## 2023-11-06 RX ORDER — ALLOPURINOL 100 MG/1
TABLET ORAL
Qty: 90 TABLET | Refills: 1 | Status: SHIPPED | OUTPATIENT
Start: 2023-11-06

## 2023-11-22 RX ORDER — ATORVASTATIN CALCIUM 20 MG/1
20 TABLET, FILM COATED ORAL DAILY
Qty: 30 TABLET | Refills: 0 | Status: SHIPPED | OUTPATIENT
Start: 2023-11-22

## 2023-12-15 DIAGNOSIS — I10 ESSENTIAL HYPERTENSION: ICD-10-CM

## 2023-12-15 DIAGNOSIS — R53.83 OTHER FATIGUE: ICD-10-CM

## 2023-12-15 RX ORDER — METOPROLOL SUCCINATE 50 MG/1
TABLET, EXTENDED RELEASE ORAL
Qty: 90 TABLET | Refills: 3 | OUTPATIENT
Start: 2023-12-15

## 2023-12-22 DIAGNOSIS — I10 ESSENTIAL HYPERTENSION: ICD-10-CM

## 2023-12-22 DIAGNOSIS — R53.83 OTHER FATIGUE: ICD-10-CM

## 2023-12-22 RX ORDER — METOPROLOL SUCCINATE 50 MG/1
TABLET, EXTENDED RELEASE ORAL
Qty: 90 TABLET | Refills: 3 | Status: SHIPPED | OUTPATIENT
Start: 2023-12-22

## 2023-12-27 RX ORDER — LISINOPRIL AND HYDROCHLOROTHIAZIDE 12.5; 1 MG/1; MG/1
TABLET ORAL
Qty: 90 TABLET | Refills: 3 | Status: SHIPPED | OUTPATIENT
Start: 2023-12-27

## 2023-12-28 RX ORDER — ATORVASTATIN CALCIUM 20 MG/1
20 TABLET, FILM COATED ORAL EVERY EVENING
Qty: 30 TABLET | Refills: 0 | Status: SHIPPED | OUTPATIENT
Start: 2023-12-28

## 2024-01-29 RX ORDER — ATORVASTATIN CALCIUM 20 MG/1
20 TABLET, FILM COATED ORAL EVERY EVENING
Qty: 90 TABLET | Refills: 0 | Status: SHIPPED | OUTPATIENT
Start: 2024-01-29

## 2024-04-10 ENCOUNTER — OFFICE VISIT (OUTPATIENT)
Dept: INTERNAL MEDICINE | Facility: CLINIC | Age: 79
End: 2024-04-10
Payer: MEDICARE

## 2024-04-10 VITALS
OXYGEN SATURATION: 94 % | SYSTOLIC BLOOD PRESSURE: 158 MMHG | WEIGHT: 236 LBS | BODY MASS INDEX: 35.77 KG/M2 | HEIGHT: 68 IN | RESPIRATION RATE: 16 BRPM | DIASTOLIC BLOOD PRESSURE: 76 MMHG | HEART RATE: 69 BPM

## 2024-04-10 DIAGNOSIS — N40.0 BENIGN NON-NODULAR PROSTATIC HYPERPLASIA: ICD-10-CM

## 2024-04-10 DIAGNOSIS — I10 ESSENTIAL HYPERTENSION: Primary | ICD-10-CM

## 2024-04-10 DIAGNOSIS — E55.9 VITAMIN D DEFICIENCY: ICD-10-CM

## 2024-04-10 DIAGNOSIS — M10.00 IDIOPATHIC GOUT, UNSPECIFIED CHRONICITY, UNSPECIFIED SITE: ICD-10-CM

## 2024-04-10 DIAGNOSIS — E78.2 MIXED HYPERLIPIDEMIA: ICD-10-CM

## 2024-04-10 DIAGNOSIS — M16.11 PRIMARY OSTEOARTHRITIS OF RIGHT HIP: ICD-10-CM

## 2024-04-10 DIAGNOSIS — Z12.5 PROSTATE CANCER SCREENING: ICD-10-CM

## 2024-04-10 DIAGNOSIS — E11.65 TYPE 2 DIABETES MELLITUS WITH HYPERGLYCEMIA, WITHOUT LONG-TERM CURRENT USE OF INSULIN: ICD-10-CM

## 2024-04-10 DIAGNOSIS — N18.2 CKD (CHRONIC KIDNEY DISEASE) STAGE 2, GFR 60-89 ML/MIN: ICD-10-CM

## 2024-04-10 LAB
EXPIRATION DATE: ABNORMAL
HBA1C MFR BLD: 6.4 % (ref 4.5–5.7)
Lab: ABNORMAL

## 2024-04-10 RX ORDER — LISINOPRIL AND HYDROCHLOROTHIAZIDE 12.5; 1 MG/1; MG/1
1 TABLET ORAL DAILY
Qty: 90 TABLET | Refills: 1 | Status: SHIPPED | OUTPATIENT
Start: 2024-04-10

## 2024-04-10 RX ORDER — ATORVASTATIN CALCIUM 20 MG/1
20 TABLET, FILM COATED ORAL EVERY EVENING
Qty: 90 TABLET | Refills: 1 | Status: SHIPPED | OUTPATIENT
Start: 2024-04-10

## 2024-04-10 RX ORDER — METOPROLOL SUCCINATE 50 MG/1
TABLET, EXTENDED RELEASE ORAL
Qty: 90 TABLET | Refills: 1 | Status: SHIPPED | OUTPATIENT
Start: 2024-04-10

## 2024-04-10 NOTE — ASSESSMENT & PLAN NOTE
Stable on current medication and dosage. Will continue current management. Refill medication as necessary.  celecoxib

## 2024-04-10 NOTE — ASSESSMENT & PLAN NOTE
Stable on current medication and dosage. Will continue current management. Refill medication as necessary.  Allopurinol  Repeat uric acid prior to next visit

## 2024-04-10 NOTE — PROGRESS NOTES
The ABCs of the Annual Wellness Visit  Subsequent Medicare Wellness Visit    Subjective      John Leyva is a 78 y.o. male who presents for a Subsequent Medicare Wellness Visit.  HTN on lisinopril-hctz and metoprolol  HLD on statin, last labs reviewed, normal LDL 94  Dm type 2; last A1c was 6.3. A1c today 6.4 diet controlled not on any medications  BPH asymptomatic, on finasteride and doxazosin  Gout: on allopurinol, stable at this time, no flare ups for 8-10 years now  CKD 2: Follows with nephrology     Colonoscopy: 2022 polyps, diverticulosis, repeat in 7 years  AAA screen normal on 2017  PSA normal (2022)    Review of Systems   All other systems reviewed and are negative.       The following portions of the patient's history were reviewed and   updated as appropriate: allergies, current medications, past family history, past medical history, past social history, past surgical history, and problem list.    Compared to one year ago, the patient feels his physical   health is better.    Compared to one year ago, the patient feels his mental   health is better.    Recent Hospitalizations:  He was not admitted to the hospital during the last year.       Current Medical Providers:  Patient Care Team:  Eli Greenwood MD as PCP - General (Family Medicine)    Outpatient Medications Prior to Visit   Medication Sig Dispense Refill    allopurinol (ZYLOPRIM) 100 MG tablet TAKE ONE TABLET BY MOUTH EVERY DAY 90 tablet 1    amLODIPine (NORVASC) 5 MG tablet TAKE ONE TABLET BY MOUTH EVERY DAY 90 tablet 3    celecoxib (CeleBREX) 100 MG capsule Take 1 capsule by mouth 2 (Two) Times a Day. 14 capsule 0    cyclobenzaprine (FLEXERIL) 5 MG tablet Take 1 tablet by mouth At Night As Needed (back pain). 14 tablet 0    doxazosin (CARDURA) 2 MG tablet TAKE ONE TABLET BY MOUTH AT BEDTIME 90 tablet 3    DULoxetine (CYMBALTA) 30 MG capsule TAKE ONE CAPSULE BY MOUTH EVERY DAY 90 capsule 3    finasteride (PROSCAR) 5 MG tablet Take 1 tablet  "by mouth Daily.      Multiple Vitamins-Minerals (MULTIVITAMIN ADULT PO) Take 1 tablet by mouth Daily.      atorvastatin (LIPITOR) 20 MG tablet TAKE ONE TABLET BY MOUTH EVERY DAY IN THE EVENING 90 tablet 0    lisinopril-hydrochlorothiazide (PRINZIDE,ZESTORETIC) 10-12.5 MG per tablet TAKE ONE TABLET BY MOUTH DAILY 90 tablet 3    metoprolol succinate XL (TOPROL-XL) 50 MG 24 hr tablet TAKE ONE TABLET BY MOUTH EVERY DAY 90 tablet 3     No facility-administered medications prior to visit.       No opioid medication identified on active medication list. I have reviewed chart for other potential  high risk medication/s and harmful drug interactions in the elderly.        Aspirin is not on active medication list.  Aspirin use is not indicated based on review of current medical condition/s. Risk of harm outweighs potential benefits.  .    Patient Active Problem List   Diagnosis    Benign non-nodular prostatic hyperplasia    Vitamin D deficiency    Type 2 diabetes mellitus with hyperglycemia    Primary gout    Hyperlipidemia    Essential hypertension    Spinal stenosis of lumbar region with neurogenic claudication    Primary osteoarthritis of right hip    Ventral hernia without obstruction or gangrene    Status post total hip replacement, right    CKD (chronic kidney disease) stage 2, GFR 60-89 ml/min     Advance Care Planning  Advance Directive is not on file.  ACP discussion was held with the patient during this visit. Patient does not have an advance directive, information provided.     Objective    Vitals:    04/10/24 0839   BP: 158/76   Pulse: 69   Resp: 16   SpO2: 94%   Weight: 107 kg (236 lb)   Height: 171.5 cm (67.52\")     Estimated body mass index is 36.4 kg/m² as calculated from the following:    Height as of this encounter: 171.5 cm (67.52\").    Weight as of this encounter: 107 kg (236 lb).    Physical Exam  Vitals and nursing note reviewed.   Constitutional:       Appearance: Normal appearance.   HENT:      Head: " Normocephalic and atraumatic.   Cardiovascular:      Rate and Rhythm: Normal rate and regular rhythm.      Pulses: Normal pulses.      Heart sounds: Normal heart sounds.   Pulmonary:      Effort: Pulmonary effort is normal. No respiratory distress.      Breath sounds: Normal breath sounds. No wheezing, rhonchi or rales.   Abdominal:      General: Abdomen is flat. Bowel sounds are normal.      Palpations: Abdomen is soft.      Tenderness: There is no abdominal tenderness. There is no guarding.   Musculoskeletal:      Cervical back: Neck supple.   Skin:     General: Skin is warm.      Capillary Refill: Capillary refill takes less than 2 seconds.   Neurological:      General: No focal deficit present.      Mental Status: He is alert. Mental status is at baseline.   Psychiatric:         Mood and Affect: Mood normal.         Behavior: Behavior normal.         Class 2 Severe Obesity (BMI >=35 and <=39.9). Obesity-related health conditions include the following: hypertension, diabetes mellitus, and dyslipidemias. Obesity is unchanged. BMI is is above average; BMI management plan is completed. We discussed portion control and increasing exercise.      Does the patient have evidence of cognitive impairment?   No            HEALTH RISK ASSESSMENT    Smoking Status:  Social History     Tobacco Use   Smoking Status Former    Current packs/day: 0.00    Average packs/day: 3.0 packs/day for 10.0 years (30.0 ttl pk-yrs)    Types: Cigarettes    Start date:     Quit date:     Years since quittin.3   Smokeless Tobacco Never     Alcohol Consumption:  Social History     Substance and Sexual Activity   Alcohol Use Not Currently     Fall Risk Screen:    STEADI Fall Risk Assessment was completed, and patient is at MODERATE risk for falls. Assessment completed on:4/10/2024    Depression Screenin/10/2024     8:39 AM   PHQ-2/PHQ-9 Depression Screening   Little Interest or Pleasure in Doing Things 0-->not at all   Feeling  Down, Depressed or Hopeless 0-->not at all   PHQ-9: Brief Depression Severity Measure Score 0       Health Habits and Functional and Cognitive Screenin/10/2024     8:38 AM   Functional & Cognitive Status   Do you have difficulty preparing food and eating? No   Do you have difficulty bathing yourself, getting dressed or grooming yourself? No   Do you have difficulty using the toilet? No   Do you have difficulty moving around from place to place? No   Do you have trouble with steps or getting out of a bed or a chair? No   Current Diet Limited Junk Food   Dental Exam Up to date   Eye Exam Up to date   Exercise (times per week) 7 times per week   Current Exercises Include Walking;Weightlifting   Do you need help using the phone?  No   Are you deaf or do you have serious difficulty hearing?  No   Do you need help to go to places out of walking distance? No   Do you need help shopping? No   Do you need help preparing meals?  No   Do you need help with housework?  No   Do you need help with laundry? No   Do you need help taking your medications? No   Do you need help managing money? No   Do you ever drive or ride in a car without wearing a seat belt? No   Have you felt unusual stress, anger or loneliness in the last month? No   Who do you live with? Spouse   If you need help, do you have trouble finding someone available to you? No   Have you been bothered in the last four weeks by sexual problems? No       Age-appropriate Screening Schedule:  Refer to the list below for future screening recommendations based on patient's age, sex and/or medical conditions. Orders for these recommended tests are listed in the plan section. The patient has been provided with a written plan.    Health Maintenance   Topic Date Due    HEMOGLOBIN A1C  2024    RSV Vaccine - Adults (1 - 1-dose 60+ series) 04/10/2025 (Originally 2007)    ZOSTER VACCINE (1 of 2) 10/01/2025 (Originally 1997)    INFLUENZA VACCINE  2024     LIPID PANEL  11/01/2024    URINE MICROALBUMIN  11/01/2024    DIABETIC EYE EXAM  03/21/2025    ANNUAL WELLNESS VISIT  04/10/2025    BMI FOLLOWUP  04/10/2025    TDAP/TD VACCINES (2 - Td or Tdap) 05/25/2027    COLORECTAL CANCER SCREENING  10/20/2029    HEPATITIS C SCREENING  Completed    COVID-19 Vaccine  Completed    Pneumococcal Vaccine 65+  Completed                CMS Preventative Services Quick Reference  Risk Factors Identified During Encounter:    Dental Screening Recommended    The above risks/problems have been discussed with the patient.  Pertinent information has been shared with the patient in the After Visit Summary.    Diagnoses and all orders for this visit:    1. Essential hypertension (Primary)  Assessment & Plan:  Stable on current medication and dosage. Will continue current management. Refill medication as necessary.  Amlodipine, lisinopril-hctz, metoprolol    Orders:  -     lisinopril-hydrochlorothiazide (PRINZIDE,ZESTORETIC) 10-12.5 MG per tablet; Take 1 tablet by mouth Daily.  Dispense: 90 tablet; Refill: 1  -     metoprolol succinate XL (TOPROL-XL) 50 MG 24 hr tablet; TAKE ONE TABLET BY MOUTH EVERY DAY  Dispense: 90 tablet; Refill: 1  -     CBC & Differential  -     Comprehensive Metabolic Panel  -     TSH Rfx On Abnormal To Free T4    2. Mixed hyperlipidemia  Assessment & Plan:  Stable on current medication and dosage. Will continue current management. Refill medication as necessary.  atorvastatin     Orders:  -     atorvastatin (LIPITOR) 20 MG tablet; Take 1 tablet by mouth Every Evening.  Dispense: 90 tablet; Refill: 1  -     Lipid Panel    3. Type 2 diabetes mellitus with hyperglycemia, without long-term current use of insulin  Assessment & Plan:  Diet controlled, not on any medications  A1c at goal 6.4% today  Recheck with labs prior to next visit    Orders:  -     Hemoglobin A1c    4. Vitamin D deficiency  Assessment & Plan:  Not on vitamin d will continue to monitor    Orders:  -      Vitamin D,25-Hydroxy    5. Benign non-nodular prostatic hyperplasia  Assessment & Plan:  Follows with urology  On doxazosin and finasteride      6. CKD (chronic kidney disease) stage 2, GFR 60-89 ml/min  Assessment & Plan:  Follows with nephrology      7. Idiopathic gout, unspecified chronicity, unspecified site  Assessment & Plan:  Stable on current medication and dosage. Will continue current management. Refill medication as necessary.  Allopurinol  Repeat uric acid prior to next visit    Orders:  -     Uric Acid    8. Primary osteoarthritis of right hip  Assessment & Plan:  Stable on current medication and dosage. Will continue current management. Refill medication as necessary.  celecoxib      9. Prostate cancer screening  -     PSA Screen        Follow Up:   Next Medicare Wellness visit to be scheduled in 1 year.      An After Visit Summary and PPPS were made available to the patient.

## 2024-04-10 NOTE — ASSESSMENT & PLAN NOTE
Stable on current medication and dosage. Will continue current management. Refill medication as necessary.  Amlodipine, lisinopril-hctz, metoprolol

## 2024-04-10 NOTE — ASSESSMENT & PLAN NOTE
Diet controlled, not on any medications  A1c at goal 6.4% today  Recheck with labs prior to next visit

## 2024-04-29 RX ORDER — DULOXETIN HYDROCHLORIDE 30 MG/1
30 CAPSULE, DELAYED RELEASE ORAL DAILY
Qty: 90 CAPSULE | Refills: 1 | Status: SHIPPED | OUTPATIENT
Start: 2024-04-29

## 2024-05-01 RX ORDER — ALLOPURINOL 100 MG/1
TABLET ORAL
Qty: 90 TABLET | Refills: 1 | Status: SHIPPED | OUTPATIENT
Start: 2024-05-01

## 2024-05-23 DIAGNOSIS — I10 ESSENTIAL HYPERTENSION: ICD-10-CM

## 2024-05-23 RX ORDER — AMLODIPINE BESYLATE 5 MG/1
5 TABLET ORAL DAILY
Qty: 90 TABLET | Refills: 1 | Status: SHIPPED | OUTPATIENT
Start: 2024-05-23

## 2024-05-29 RX ORDER — DOXAZOSIN 2 MG/1
2 TABLET ORAL
Qty: 90 TABLET | Refills: 0 | Status: SHIPPED | OUTPATIENT
Start: 2024-05-29

## 2024-05-29 NOTE — TELEPHONE ENCOUNTER
Caller: Gordon John CARSON    Relationship: Self    Best call back number: [phone]     Requested Prescriptions:   Requested Prescriptions     Pending Prescriptions Disp Refills    doxazosin (CARDURA) 2 MG tablet 90 tablet 3     Sig: Take 1 tablet by mouth every night at bedtime.        Pharmacy where request should be sent: [facility] [address] [phone] [facility] [phone]      Last office visit with prescribing clinician: 4/10/2024   Last telemedicine visit with prescribing clinician: Visit date not found   Next office visit with prescribing clinician: 10/17/2024     Does the patient have less than a 3 day supply:  [x] Yes  [] No    Would you like a call back once the refill request has been completed: [] Yes [x] No    If the office needs to give you a call back, can they leave a voicemail: [] Yes [x] No    Martin Mcdaniel Rep   05/29/24 14:15 EDT

## 2024-05-30 ENCOUNTER — OFFICE VISIT (OUTPATIENT)
Dept: INTERNAL MEDICINE | Facility: CLINIC | Age: 79
End: 2024-05-30
Payer: MEDICARE

## 2024-05-30 VITALS
HEART RATE: 60 BPM | OXYGEN SATURATION: 94 % | BODY MASS INDEX: 36.53 KG/M2 | HEIGHT: 68 IN | WEIGHT: 241 LBS | DIASTOLIC BLOOD PRESSURE: 84 MMHG | RESPIRATION RATE: 17 BRPM | SYSTOLIC BLOOD PRESSURE: 138 MMHG

## 2024-05-30 DIAGNOSIS — N18.2 CKD (CHRONIC KIDNEY DISEASE) STAGE 2, GFR 60-89 ML/MIN: ICD-10-CM

## 2024-05-30 DIAGNOSIS — R30.0 DYSURIA: Primary | ICD-10-CM

## 2024-05-30 DIAGNOSIS — N40.0 BENIGN NON-NODULAR PROSTATIC HYPERPLASIA: ICD-10-CM

## 2024-05-30 LAB
BILIRUB BLD-MCNC: NEGATIVE MG/DL
CLARITY, POC: CLEAR
COLOR UR: YELLOW
EXPIRATION DATE: NORMAL
GLUCOSE UR STRIP-MCNC: NEGATIVE MG/DL
KETONES UR QL: NEGATIVE
LEUKOCYTE EST, POC: NEGATIVE
Lab: NORMAL
NITRITE UR-MCNC: NEGATIVE MG/ML
PH UR: 7 [PH] (ref 5–8)
PROT UR STRIP-MCNC: NEGATIVE MG/DL
RBC # UR STRIP: NEGATIVE /UL
SP GR UR: 1.01 (ref 1–1.03)
UROBILINOGEN UR QL: NORMAL

## 2024-05-30 PROCEDURE — G2211 COMPLEX E/M VISIT ADD ON: HCPCS | Performed by: STUDENT IN AN ORGANIZED HEALTH CARE EDUCATION/TRAINING PROGRAM

## 2024-05-30 PROCEDURE — 3075F SYST BP GE 130 - 139MM HG: CPT | Performed by: STUDENT IN AN ORGANIZED HEALTH CARE EDUCATION/TRAINING PROGRAM

## 2024-05-30 PROCEDURE — 99214 OFFICE O/P EST MOD 30 MIN: CPT | Performed by: STUDENT IN AN ORGANIZED HEALTH CARE EDUCATION/TRAINING PROGRAM

## 2024-05-30 PROCEDURE — 1126F AMNT PAIN NOTED NONE PRSNT: CPT | Performed by: STUDENT IN AN ORGANIZED HEALTH CARE EDUCATION/TRAINING PROGRAM

## 2024-05-30 PROCEDURE — 81003 URINALYSIS AUTO W/O SCOPE: CPT | Performed by: STUDENT IN AN ORGANIZED HEALTH CARE EDUCATION/TRAINING PROGRAM

## 2024-05-30 PROCEDURE — 3079F DIAST BP 80-89 MM HG: CPT | Performed by: STUDENT IN AN ORGANIZED HEALTH CARE EDUCATION/TRAINING PROGRAM

## 2024-05-30 RX ORDER — FINASTERIDE 5 MG/1
5 TABLET, FILM COATED ORAL DAILY
Qty: 15 TABLET | Refills: 0 | Status: SHIPPED | OUTPATIENT
Start: 2024-05-30

## 2024-05-30 NOTE — PROGRESS NOTES
Office Note     Name: John Leyva    : 1945     MRN: [id number]     Chief Complaint  Difficulty Urinating (Started Saturday, getting worse)    Subjective     History of Present Illness:  John Leyva is a 78 y.o. male who presents today for dysuria and urinary frequency x4 days. He drinks two 16.9oz sodas per day, only drinks 48oz of water per day. No fever or chills  Hx of BPH but he notes that urologist advised him to stop his prostate medications in the past. So he claims he is not taking these. Appointment set for next week with urology.   He comes in with soda bottle in hand and notes he has increased his soda intake and decreased water intake.    Family History:   Family History   Problem Relation Age of Onset    Cancer Mother     Cancer Father         Bone cancer    Cancer Other     Colon cancer Neg Hx     Liver cancer Neg Hx     Rectal cancer Neg Hx     Stomach cancer Neg Hx        Social History:   Social History     Socioeconomic History    Marital status:      Spouse name: Yolanda   Tobacco Use    Smoking status: Former     Current packs/day: 0.00     Average packs/day: 3.0 packs/day for 10.0 years (30.0 ttl pk-yrs)     Types: Cigarettes     Start date:      Quit date:      Years since quittin.4    Smokeless tobacco: Never   Vaping Use    Vaping status: Never Used   Substance and Sexual Activity    Alcohol use: Not Currently    Drug use: No    Sexual activity: Not Currently     Partners: Male, Female       Health Maintenance   Topic Date Due    COVID-19 Vaccine ( season) 2024    RSV Vaccine - Adults (1 - 1-dose 60+ series) 04/10/2025 (Originally 2007)    ZOSTER VACCINE (1 of 2) 10/01/2025 (Originally 1997)    INFLUENZA VACCINE  2024    HEMOGLOBIN A1C  10/10/2024    LIPID PANEL  2024    URINE MICROALBUMIN  2024    DIABETIC EYE EXAM  2025    ANNUAL WELLNESS VISIT  04/10/2025    BMI FOLLOWUP  04/10/2025    TDAP/TD VACCINES (2 - Td  "or Tdap) 05/25/2027    COLORECTAL CANCER SCREENING  10/20/2029    HEPATITIS C SCREENING  Completed    Pneumococcal Vaccine 65+  Completed       Objective     Vital Signs  /84   Pulse 60   Resp 17   Ht 171.5 cm (67.52\")   Wt 109 kg (241 lb)   SpO2 94%   BMI 37.17 kg/m²   Estimated body mass index is 37.17 kg/m² as calculated from the following:    Height as of this encounter: 171.5 cm (67.52\").    Weight as of this encounter: 109 kg (241 lb).        Physical Exam  Vitals and nursing note reviewed.   Constitutional:       Appearance: Normal appearance.   HENT:      Head: Normocephalic and atraumatic.   Cardiovascular:      Rate and Rhythm: Normal rate and regular rhythm.      Pulses: Normal pulses.      Heart sounds: Normal heart sounds.   Pulmonary:      Effort: Pulmonary effort is normal. No respiratory distress.      Breath sounds: Normal breath sounds. No wheezing, rhonchi or rales.   Abdominal:      General: Abdomen is flat. Bowel sounds are normal.      Palpations: Abdomen is soft.      Tenderness: There is no abdominal tenderness. There is no guarding.   Genitourinary:     Comments: No pelvic pain. No CVA tenderness  Musculoskeletal:      Cervical back: Neck supple.   Skin:     General: Skin is warm.      Capillary Refill: Capillary refill takes less than 2 seconds.   Neurological:      General: No focal deficit present.      Mental Status: He is alert. Mental status is at baseline.   Psychiatric:         Mood and Affect: Mood normal.         Behavior: Behavior normal.          Procedures     Assessment and Plan     Diagnoses and all orders for this visit:    1. Dysuria (Primary)  Assessment & Plan:  Urinalysis in office is normal. Will send for culture. Patient came in with soda bottle in hand. Likely due to enlarged prostate and/or dehydration. Will obtain CBC and BMP. Advised to follow with urology for further assessment.     Orders:  -     CBC w AUTO Differential  -     Basic Metabolic " Panel    2. CKD (chronic kidney disease) stage 2, GFR 60-89 ml/min  Assessment & Plan:  Recheck labs including BMP  Avoid sode  Increase water intake to 80oz per day    Orders:  -     CBC w AUTO Differential  -     Basic Metabolic Panel    3. Benign non-nodular prostatic hyperplasia  Assessment & Plan:  Has appt with urologist  Likely having lower urinary symptoms sec to BPH  May need to continue doxazosin and restart finasteride.    Orders:  -     finasteride (PROSCAR) 5 MG tablet; Take 1 tablet by mouth Daily.  Dispense: 15 tablet; Refill: 0         Counseling was given to patient for the following topics: instructions for management, risks and benefits of treatment options, and importance of treatment compliance.    Follow Up  No follow-ups on file.    MD KEVIN Lin  [facility]  [address] [place] [id number] [phone]

## 2024-05-30 NOTE — ADDENDUM NOTE
Addended by: HUGO MCCARTHY on: 5/30/2024 04:44 PM     Modules accepted: Orders    
I have personally seen and examined this patient.  I have fully participated in the care of this patient. I performed a substantive portion of the visit including all aspects of the medical decision making. I have reviewed all pertinent clinical information, including history, physical exam, plan and the Resident’s note and agree except as noted. - MD Marty.    see dispo charts

## 2024-05-30 NOTE — ASSESSMENT & PLAN NOTE
Has appt with urologist  Likely having lower urinary symptoms sec to BPH  May need to continue doxazosin and restart finasteride.

## 2024-05-30 NOTE — ASSESSMENT & PLAN NOTE
Urinalysis in office is normal. Will send for culture. Patient came in with soda bottle in hand. Likely due to enlarged prostate and/or dehydration. Will obtain CBC and BMP. Advised to follow with urology for further assessment.

## 2024-05-31 LAB
BASOPHILS # BLD AUTO: 0.02 10*3/MM3 (ref 0–0.2)
BASOPHILS NFR BLD AUTO: 0.2 % (ref 0–1.5)
BUN SERPL-MCNC: 12 MG/DL (ref 8–23)
BUN/CREAT SERPL: 12.9 (ref 7–25)
CALCIUM SERPL-MCNC: 9.5 MG/DL (ref 8.6–10.5)
CHLORIDE SERPL-SCNC: 97 MMOL/L (ref 98–107)
CO2 SERPL-SCNC: 27.3 MMOL/L (ref 22–29)
CREAT SERPL-MCNC: 0.93 MG/DL (ref 0.76–1.27)
EGFRCR SERPLBLD CKD-EPI 2021: 85.1 ML/MIN/1.73
EOSINOPHIL # BLD AUTO: 0.2 10*3/MM3 (ref 0–0.4)
EOSINOPHIL NFR BLD AUTO: 2.2 % (ref 0.3–6.2)
ERYTHROCYTE [DISTWIDTH] IN BLOOD BY AUTOMATED COUNT: 13.2 % (ref 12.3–15.4)
GLUCOSE SERPL-MCNC: 94 MG/DL (ref 65–99)
HCT VFR BLD AUTO: 47.8 % (ref 37.5–51)
HGB BLD-MCNC: 16 G/DL (ref 13–17.7)
IMM GRANULOCYTES # BLD AUTO: 0.03 10*3/MM3 (ref 0–0.05)
IMM GRANULOCYTES NFR BLD AUTO: 0.3 % (ref 0–0.5)
LYMPHOCYTES # BLD AUTO: 3.09 10*3/MM3 (ref 0.7–3.1)
LYMPHOCYTES NFR BLD AUTO: 34.6 % (ref 19.6–45.3)
MCH RBC QN AUTO: 30.4 PG (ref 26.6–33)
MCHC RBC AUTO-ENTMCNC: 33.5 G/DL (ref 31.5–35.7)
MCV RBC AUTO: 90.9 FL (ref 79–97)
MONOCYTES # BLD AUTO: 0.56 10*3/MM3 (ref 0.1–0.9)
MONOCYTES NFR BLD AUTO: 6.3 % (ref 5–12)
NEUTROPHILS # BLD AUTO: 5.03 10*3/MM3 (ref 1.7–7)
NEUTROPHILS NFR BLD AUTO: 56.4 % (ref 42.7–76)
NRBC BLD AUTO-RTO: 0 /100 WBC (ref 0–0.2)
PLATELET # BLD AUTO: 274 10*3/MM3 (ref 140–450)
POTASSIUM SERPL-SCNC: 4.8 MMOL/L (ref 3.5–5.2)
RBC # BLD AUTO: 5.26 10*6/MM3 (ref 4.14–5.8)
SODIUM SERPL-SCNC: 135 MMOL/L (ref 136–145)
WBC # BLD AUTO: 8.93 10*3/MM3 (ref 3.4–10.8)

## 2024-06-01 LAB
BACTERIA UR CULT: NO GROWTH
BACTERIA UR CULT: NORMAL

## 2024-06-04 ENCOUNTER — LAB (OUTPATIENT)
Dept: LAB | Facility: HOSPITAL | Age: 79
End: 2024-06-04
Payer: MEDICARE

## 2024-06-04 ENCOUNTER — OFFICE VISIT (OUTPATIENT)
Dept: UROLOGY | Facility: CLINIC | Age: 79
End: 2024-06-04
Payer: MEDICARE

## 2024-06-04 VITALS
BODY MASS INDEX: 36.53 KG/M2 | WEIGHT: 241 LBS | TEMPERATURE: 98.6 F | HEIGHT: 68 IN | HEART RATE: 67 BPM | SYSTOLIC BLOOD PRESSURE: 118 MMHG | OXYGEN SATURATION: 94 % | DIASTOLIC BLOOD PRESSURE: 70 MMHG

## 2024-06-04 DIAGNOSIS — N40.1 BENIGN PROSTATIC HYPERPLASIA WITH LOWER URINARY TRACT SYMPTOMS, SYMPTOM DETAILS UNSPECIFIED: Primary | ICD-10-CM

## 2024-06-04 DIAGNOSIS — N40.1 BENIGN PROSTATIC HYPERPLASIA WITH LOWER URINARY TRACT SYMPTOMS, SYMPTOM DETAILS UNSPECIFIED: ICD-10-CM

## 2024-06-04 LAB
25(OH)D3 SERPL-MCNC: 56.5 NG/ML (ref 30–100)
ALBUMIN SERPL-MCNC: 4 G/DL (ref 3.5–5.2)
ALBUMIN/GLOB SERPL: 1 G/DL
ALP SERPL-CCNC: 121 U/L (ref 39–117)
ALT SERPL W P-5'-P-CCNC: 21 U/L (ref 1–41)
ANION GAP SERPL CALCULATED.3IONS-SCNC: 9.6 MMOL/L (ref 5–15)
AST SERPL-CCNC: 23 U/L (ref 1–40)
BASOPHILS # BLD AUTO: 0.03 10*3/MM3 (ref 0–0.2)
BASOPHILS NFR BLD AUTO: 0.3 % (ref 0–1.5)
BILIRUB BLD-MCNC: NEGATIVE MG/DL
BILIRUB SERPL-MCNC: 1.1 MG/DL (ref 0–1.2)
BUN SERPL-MCNC: 15 MG/DL (ref 8–23)
BUN/CREAT SERPL: 16.3 (ref 7–25)
CALCIUM SPEC-SCNC: 9.8 MG/DL (ref 8.6–10.5)
CHLORIDE SERPL-SCNC: 98 MMOL/L (ref 98–107)
CHOLEST SERPL-MCNC: 154 MG/DL (ref 0–200)
CLARITY, POC: CLEAR
CO2 SERPL-SCNC: 24.4 MMOL/L (ref 22–29)
COLOR UR: YELLOW
CREAT SERPL-MCNC: 0.92 MG/DL (ref 0.76–1.27)
DEPRECATED RDW RBC AUTO: 45.2 FL (ref 37–54)
EGFRCR SERPLBLD CKD-EPI 2021: 86.2 ML/MIN/1.73
EOSINOPHIL # BLD AUTO: 0.07 10*3/MM3 (ref 0–0.4)
EOSINOPHIL NFR BLD AUTO: 0.8 % (ref 0.3–6.2)
ERYTHROCYTE [DISTWIDTH] IN BLOOD BY AUTOMATED COUNT: 13.5 % (ref 12.3–15.4)
EXPIRATION DATE: NORMAL
GLOBULIN UR ELPH-MCNC: 4.2 GM/DL
GLUCOSE SERPL-MCNC: 119 MG/DL (ref 65–99)
GLUCOSE UR STRIP-MCNC: NEGATIVE MG/DL
HBA1C MFR BLD: 6.3 % (ref 4.8–5.6)
HCT VFR BLD AUTO: 47.2 % (ref 37.5–51)
HDLC SERPL-MCNC: 39 MG/DL (ref 40–60)
HGB BLD-MCNC: 15.9 G/DL (ref 13–17.7)
IMM GRANULOCYTES # BLD AUTO: 0.04 10*3/MM3 (ref 0–0.05)
IMM GRANULOCYTES NFR BLD AUTO: 0.4 % (ref 0–0.5)
KETONES UR QL: NEGATIVE
LDLC SERPL CALC-MCNC: 87 MG/DL (ref 0–100)
LDLC/HDLC SERPL: 2.14 {RATIO}
LEUKOCYTE EST, POC: NEGATIVE
LYMPHOCYTES # BLD AUTO: 2.44 10*3/MM3 (ref 0.7–3.1)
LYMPHOCYTES NFR BLD AUTO: 27.3 % (ref 19.6–45.3)
Lab: NORMAL
MCH RBC QN AUTO: 30.6 PG (ref 26.6–33)
MCHC RBC AUTO-ENTMCNC: 33.7 G/DL (ref 31.5–35.7)
MCV RBC AUTO: 90.9 FL (ref 79–97)
MONOCYTES # BLD AUTO: 0.52 10*3/MM3 (ref 0.1–0.9)
MONOCYTES NFR BLD AUTO: 5.8 % (ref 5–12)
NEUTROPHILS NFR BLD AUTO: 5.84 10*3/MM3 (ref 1.7–7)
NEUTROPHILS NFR BLD AUTO: 65.4 % (ref 42.7–76)
NITRITE UR-MCNC: NEGATIVE MG/ML
NRBC BLD AUTO-RTO: 0 /100 WBC (ref 0–0.2)
PH UR: 7 [PH] (ref 5–8)
PLATELET # BLD AUTO: 234 10*3/MM3 (ref 140–450)
PMV BLD AUTO: 10.7 FL (ref 6–12)
POTASSIUM SERPL-SCNC: 4.4 MMOL/L (ref 3.5–5.2)
PROT SERPL-MCNC: 8.2 G/DL (ref 6–8.5)
PROT UR STRIP-MCNC: NEGATIVE MG/DL
PSA SERPL-MCNC: 5.3 NG/ML (ref 0–4)
PSA SERPL-MCNC: 5.3 NG/ML (ref 0–4)
RBC # BLD AUTO: 5.19 10*6/MM3 (ref 4.14–5.8)
RBC # UR STRIP: NEGATIVE /UL
SODIUM SERPL-SCNC: 132 MMOL/L (ref 136–145)
SP GR UR: 1.01 (ref 1–1.03)
TRIGL SERPL-MCNC: 158 MG/DL (ref 0–150)
TSH SERPL DL<=0.05 MIU/L-ACNC: 2.38 UIU/ML (ref 0.27–4.2)
URATE SERPL-MCNC: 6.7 MG/DL (ref 3.4–7)
UROBILINOGEN UR QL: NORMAL
VLDLC SERPL-MCNC: 28 MG/DL (ref 5–40)
WBC NRBC COR # BLD AUTO: 8.94 10*3/MM3 (ref 3.4–10.8)

## 2024-06-04 PROCEDURE — 80053 COMPREHEN METABOLIC PANEL: CPT | Performed by: STUDENT IN AN ORGANIZED HEALTH CARE EDUCATION/TRAINING PROGRAM

## 2024-06-04 PROCEDURE — 80061 LIPID PANEL: CPT | Performed by: STUDENT IN AN ORGANIZED HEALTH CARE EDUCATION/TRAINING PROGRAM

## 2024-06-04 PROCEDURE — 1159F MED LIST DOCD IN RCRD: CPT | Performed by: NURSE PRACTITIONER

## 2024-06-04 PROCEDURE — 99214 OFFICE O/P EST MOD 30 MIN: CPT | Performed by: NURSE PRACTITIONER

## 2024-06-04 PROCEDURE — 84153 ASSAY OF PSA TOTAL: CPT

## 2024-06-04 PROCEDURE — 1160F RVW MEDS BY RX/DR IN RCRD: CPT | Performed by: NURSE PRACTITIONER

## 2024-06-04 PROCEDURE — 85025 COMPLETE CBC W/AUTO DIFF WBC: CPT | Performed by: STUDENT IN AN ORGANIZED HEALTH CARE EDUCATION/TRAINING PROGRAM

## 2024-06-04 PROCEDURE — 3078F DIAST BP <80 MM HG: CPT | Performed by: NURSE PRACTITIONER

## 2024-06-04 PROCEDURE — 51798 US URINE CAPACITY MEASURE: CPT | Performed by: NURSE PRACTITIONER

## 2024-06-04 PROCEDURE — 81003 URINALYSIS AUTO W/O SCOPE: CPT | Performed by: NURSE PRACTITIONER

## 2024-06-04 PROCEDURE — 82306 VITAMIN D 25 HYDROXY: CPT | Performed by: STUDENT IN AN ORGANIZED HEALTH CARE EDUCATION/TRAINING PROGRAM

## 2024-06-04 PROCEDURE — 3074F SYST BP LT 130 MM HG: CPT | Performed by: NURSE PRACTITIONER

## 2024-06-04 PROCEDURE — 84443 ASSAY THYROID STIM HORMONE: CPT | Performed by: STUDENT IN AN ORGANIZED HEALTH CARE EDUCATION/TRAINING PROGRAM

## 2024-06-04 PROCEDURE — 84550 ASSAY OF BLOOD/URIC ACID: CPT | Performed by: STUDENT IN AN ORGANIZED HEALTH CARE EDUCATION/TRAINING PROGRAM

## 2024-06-04 PROCEDURE — 83036 HEMOGLOBIN GLYCOSYLATED A1C: CPT | Performed by: STUDENT IN AN ORGANIZED HEALTH CARE EDUCATION/TRAINING PROGRAM

## 2024-06-04 PROCEDURE — G0103 PSA SCREENING: HCPCS | Performed by: STUDENT IN AN ORGANIZED HEALTH CARE EDUCATION/TRAINING PROGRAM

## 2024-06-04 NOTE — PROGRESS NOTES
Office Visit Established Male Patient     Patient Name: John Leyva  : 1945   MRN: [id number]     Chief Complaint:   Chief Complaint   Patient presents with    Follow-up    Urinary Urgency     Some burning with urination  Low stream but has improved some       History of Present Illness: Mr. John Leyva is a 78 y.o. male who presents today for follow up for BPH with LUTS.  Patient has history of elevated PSA in the past with prostate biopsy with Dr. Glaser per patient report.  Patient's last OV was in  and was to follow up PRN.  Patient reports around 2 weeks ago dysuria with weak stream.  Reports it has improved some.  He was seen by PCP a week ago and urine was clear.  He does report a lot of coffee intake.  He has since increased his water intake.  He has been trying to get over 100 ounces of water per day.  He was formerly on finasteride and was discontinued by us.  He has been taking doxazosin for years.  He was restarted on finasteride last week.        IPSS Questionnaire (AUA-7):  Incomplete emptying  Over the past month, how often have you had a sensation of not emptying your bladder completely after you finished urinating?: More than half the time (24)  Frequency  Over the past month, how often have you had to urinate again less than two hours after you finishied urinating ?: More than half the time (24)  Intermittency  Over the past month, how often have you found you stopped and started again several time when you urinated ?: Almost always (24)  Urgency  Over the last month, how often have you found it difficult  have you found it difficult to postpone urination ?: Almost always (24)  Weak Stream  Over the past month, how often have you had a weak urinary stream ?: Almost always (24)  Straining  Over the past month, how often have you had to push or strain to begin urination ?: Less than 1 time in 5 (24)  Nocturia  Over  the past month, how many times did you most typically get up to urinate from the time you went to bed until the time you got up in the morning ?: 4 times (06/04/24 0952)  Quality of life due to urinary symptoms  If you were to spend the rest of your life with your urinary condition the way it is now, how would feel about that?: Mixed - about equally satisfied (06/04/24 0952)    Scores  Total IPSS Score: (!) 28 (06/04/24 0952)  Total Score = Symptomatic Level: Severely symptomatic: 20-35 (06/04/24 0952)        Subjective      Review of System:   As noted in HPI.    Past Medical History:   Past Medical History:   Diagnosis Date    Arthritis     Bell's palsy 10/19/2022    1974    BPH (benign prostatic hyperplasia)     Cataract, bilateral     s/p surgery    Diabetes mellitus     no medications     ED (erectile dysfunction)     Elevated cholesterol     Elevated PSA     Gout     Hypertension     Hypogonadism in male     Hypothyroidism     no medications    Impaired functional mobility, balance, gait, and endurance     Low kidney function     Retinal detachment     Wears glasses        Past Surgical History:   Past Surgical History:   Procedure Laterality Date    APPENDECTOMY      COLONOSCOPY      over 5 years ago  Dr Yen    COLONOSCOPY N/A 10/20/2022    Procedure: COLONOSCOPY with polypectomy;  Surgeon: Nii Longoria MD;  Location: [facility] ENDOSCOPY;  Service: Gastroenterology;  Laterality: N/A;    ELBOW OLECRANNON BURSA EXCISION Right     EYE SURGERY Bilateral     cataracts removed    LUMBAR LAMINECTOMY DISCECTOMY DECOMPRESSION Right 06/04/2020    Procedure: LUMBAR LAMINECTOMY L4-5, HEMILAMINECTOMY L3-4 RIGHT;  Surgeon: Paulie Daniels MD;  Location: [facility] OR;  Service: Neurosurgery;  Laterality: Right;    RETINAL DETACHMENT SURGERY Right     x3 surgeries    SKIN BIOPSY      benign    TOTAL HIP ARTHROPLASTY Right 04/13/2021    Procedure: Elective total hip arthroplasty;  Surgeon: Jose Carlson MD;   Location: [facility] OR;  Service: Orthopedics;  Laterality: Right;       Family History:   Family History   Problem Relation Age of Onset    Cancer Mother     Cancer Father         Bone cancer    Cancer Other     Colon cancer Neg Hx     Liver cancer Neg Hx     Rectal cancer Neg Hx     Stomach cancer Neg Hx        Social History:   Social History     Socioeconomic History    Marital status:      Spouse name: Yolanda   Tobacco Use    Smoking status: Former     Current packs/day: 0.00     Average packs/day: 3.0 packs/day for 10.0 years (30.0 ttl pk-yrs)     Types: Cigarettes     Start date:      Quit date:      Years since quittin.4    Smokeless tobacco: Never   Vaping Use    Vaping status: Never Used   Substance and Sexual Activity    Alcohol use: Not Currently    Drug use: No    Sexual activity: Not Currently     Partners: Male, Female       Medications:     Current Outpatient Medications:     allopurinol (ZYLOPRIM) 100 MG tablet, TAKE ONE TABLET BY MOUTH EVERY DAY, Disp: 90 tablet, Rfl: 1    amLODIPine (NORVASC) 5 MG tablet, Take 1 tablet by mouth Daily., Disp: 90 tablet, Rfl: 1    atorvastatin (LIPITOR) 20 MG tablet, Take 1 tablet by mouth Every Evening., Disp: 90 tablet, Rfl: 1    doxazosin (CARDURA) 2 MG tablet, Take 1 tablet by mouth every night at bedtime., Disp: 90 tablet, Rfl: 0    DULoxetine (CYMBALTA) 30 MG capsule, Take 1 capsule by mouth Daily., Disp: 90 capsule, Rfl: 1    finasteride (PROSCAR) 5 MG tablet, Take 1 tablet by mouth Daily., Disp: 15 tablet, Rfl: 0    lisinopril-hydrochlorothiazide (PRINZIDE,ZESTORETIC) 10-12.5 MG per tablet, Take 1 tablet by mouth Daily., Disp: 90 tablet, Rfl: 1    metoprolol succinate XL (TOPROL-XL) 50 MG 24 hr tablet, TAKE ONE TABLET BY MOUTH EVERY DAY, Disp: 90 tablet, Rfl: 1    Multiple Vitamins-Minerals (MULTIVITAMIN ADULT PO), Take 1 tablet by mouth Daily., Disp: , Rfl:     Allergies:   No Known Allergies    Objective     Physical Exam:   Vital Signs:  "  Vitals:    06/04/24 0953   BP: 118/70   Pulse: 67   Temp: 98.6 °F (37 °C)   SpO2: 94%   Weight: 109 kg (241 lb)   Height: 171.5 cm (67.52\")     Body mass index is 37.17 kg/m².   Physical Exam  Vitals and nursing note reviewed. Exam conducted with a chaperone present.   Constitutional:       General: He is awake. He is not in acute distress.     Appearance: He is obese. He is not ill-appearing.   Pulmonary:      Effort: Pulmonary effort is normal.   Abdominal:      Hernia: A hernia is present. Hernia is present in the ventral area.   Genitourinary:     Penis: Normal and uncircumcised. No phimosis, paraphimosis, hypospadias, erythema, tenderness, discharge, swelling or lesions.       Testes: Normal.      Epididymis:      Right: Normal.      Left: Normal.   Skin:     General: Skin is warm and dry.   Neurological:      Mental Status: He is alert and oriented to person, place, and time. Mental status is at baseline.   Psychiatric:         Mood and Affect: Mood normal.         Behavior: Behavior is cooperative.              Labs  Brief Urine Lab Results  (Last result in the past 365 days)        Color   Clarity   Blood   Leuk Est   Nitrite   Protein   CREAT   Urine HCG        06/04/24 1019 Yellow   Clear   Negative   Negative   Negative   Negative                   Lab Results   Component Value Date    GLUCOSE 94 05/30/2024    CALCIUM 9.5 05/30/2024     (L) 05/30/2024    K 4.8 05/30/2024    CO2 27.3 05/30/2024    CL 97 (L) 05/30/2024    BUN 12 05/30/2024    CREATININE 0.93 05/30/2024    EGFRIFAFRI 79 10/04/2021    EGFRIFNONA 65 10/04/2021    BCR 12.9 05/30/2024    ANIONGAP 8.9 04/14/2021       Lab Results   Component Value Date    WBC 8.93 05/30/2024    HGB 16.0 05/30/2024    HCT 47.8 05/30/2024    MCV 90.9 05/30/2024     05/30/2024       Urine Culture          5/30/2024    16:55   Urine Culture   Urine Culture Final report         Lab Results   Component Value Date    PSA 2.770 04/05/2022       Office " Visit on 06/04/2024   Component Date Value Ref Range Status    Color 06/04/2024 Yellow  Yellow, Straw, Dark Yellow, María Final    Clarity, UA 06/04/2024 Clear  Clear Final    Specific Gravity  06/04/2024 1.010  1.005 - 1.030 Final    pH, Urine 06/04/2024 7.0  5.0 - 8.0 Final    Leukocytes 06/04/2024 Negative  Negative Final    Nitrite, UA 06/04/2024 Negative  Negative Final    Protein, POC 06/04/2024 Negative  Negative mg/dL Final    Glucose, UA 06/04/2024 Negative  Negative mg/dL Final    Ketones, UA 06/04/2024 Negative  Negative Final    Urobilinogen, UA 06/04/2024 Normal  Normal, 0.2 E.U./dL Final    Bilirubin 06/04/2024 Negative  Negative Final    Blood, UA 06/04/2024 Negative  Negative Final    Lot Number 06/04/2024 [id number]   Final    Expiration Date 06/04/2024 10/21/2025   Final   Office Visit on 05/30/2024   Component Date Value Ref Range Status    WBC 05/30/2024 8.93  3.40 - 10.80 10*3/mm3 Final    RBC 05/30/2024 5.26  4.14 - 5.80 10*6/mm3 Final    Hemoglobin 05/30/2024 16.0  13.0 - 17.7 g/dL Final    Hematocrit 05/30/2024 47.8  37.5 - 51.0 % Final    MCV 05/30/2024 90.9  79.0 - 97.0 fL Final    MCH 05/30/2024 30.4  26.6 - 33.0 pg Final    MCHC 05/30/2024 33.5  31.5 - 35.7 g/dL Final    RDW 05/30/2024 13.2  12.3 - 15.4 % Final    Platelets 05/30/2024 274  140 - 450 10*3/mm3 Final    Neutrophil Rel % 05/30/2024 56.4  42.7 - 76.0 % Final    Lymphocyte Rel % 05/30/2024 34.6  19.6 - 45.3 % Final    Monocyte Rel % 05/30/2024 6.3  5.0 - 12.0 % Final    Eosinophil Rel % 05/30/2024 2.2  0.3 - 6.2 % Final    Basophil Rel % 05/30/2024 0.2  0.0 - 1.5 % Final    Neutrophils Absolute 05/30/2024 5.03  1.70 - 7.00 10*3/mm3 Final    Lymphocytes Absolute 05/30/2024 3.09  0.70 - 3.10 10*3/mm3 Final    Monocytes Absolute 05/30/2024 0.56  0.10 - 0.90 10*3/mm3 Final    Eosinophils Absolute 05/30/2024 0.20  0.00 - 0.40 10*3/mm3 Final    Basophils Absolute 05/30/2024 0.02  0.00 - 0.20 10*3/mm3 Final    Immature Granulocyte  Rel % 05/30/2024 0.3  0.0 - 0.5 % Final    Immature Grans Absolute 05/30/2024 0.03  0.00 - 0.05 10*3/mm3 Final    nRBC 05/30/2024 0.0  0.0 - 0.2 /100 WBC Final    Glucose 05/30/2024 94  65 - 99 mg/dL Final    BUN 05/30/2024 12  8 - 23 mg/dL Final    Creatinine 05/30/2024 0.93  0.76 - 1.27 mg/dL Final    EGFR Result 05/30/2024 85.1  >60.0 mL/min/1.73 Final    Comment: GFR Normal >60  Chronic Kidney Disease <60  Kidney Failure <15  The GFR formula is only valid for adults with stable renal  function between ages 18 and 70.      BUN/Creatinine Ratio 05/30/2024 12.9  7.0 - 25.0 Final    Sodium 05/30/2024 135 (L)  136 - 145 mmol/L Final    Potassium 05/30/2024 4.8  3.5 - 5.2 mmol/L Final    Chloride 05/30/2024 97 (L)  98 - 107 mmol/L Final    Total CO2 05/30/2024 27.3  22.0 - 29.0 mmol/L Final    Calcium 05/30/2024 9.5  8.6 - 10.5 mg/dL Final    Color 05/30/2024 Yellow  Yellow, Straw, Dark Yellow, María Final    Clarity, UA 05/30/2024 Clear  Clear Final    Specific Gravity  05/30/2024 1.010  1.005 - 1.030 Final    pH, Urine 05/30/2024 7.0  5.0 - 8.0 Final    Leukocytes 05/30/2024 Negative  Negative Final    Nitrite, UA 05/30/2024 Negative  Negative Final    Protein, POC 05/30/2024 Negative  Negative mg/dL Final    Glucose, UA 05/30/2024 Negative  Negative mg/dL Final    Ketones, UA 05/30/2024 Negative  Negative Final    Urobilinogen, UA 05/30/2024 Normal  Normal, 0.2 E.U./dL Final    Bilirubin 05/30/2024 Negative  Negative Final    Blood, UA 05/30/2024 Negative  Negative Final    Lot Number 05/30/2024 [id number]   Final    Expiration Date 05/30/2024 11/08/2025   Final    Urine Culture 05/30/2024 Final report   Final    Result 1 05/30/2024 No growth   Final   Office Visit on 04/10/2024   Component Date Value Ref Range Status    Hemoglobin A1C 04/10/2024 6.4 (A)  4.5 - 5.7 % Final    Lot Number 04/10/2024 [id number]   Final    Expiration Date 04/10/2024 07/30/2025   Final   Office Visit on 11/01/2023   Component Date  Value Ref Range Status    Hemoglobin A1C 11/01/2023 6.3 (A)  4.5 - 5.7 % Final    Lot Number 11/01/2023 [id number]   Final    Expiration Date 11/01/2023 07/02/2025   Final    WBC 11/01/2023 8.36  3.40 - 10.80 10*3/mm3 Final    RBC 11/01/2023 5.24  4.14 - 5.80 10*6/mm3 Final    Hemoglobin 11/01/2023 16.3  13.0 - 17.7 g/dL Final    Hematocrit 11/01/2023 48.7  37.5 - 51.0 % Final    MCV 11/01/2023 92.9  79.0 - 97.0 fL Final    MCH 11/01/2023 31.1  26.6 - 33.0 pg Final    MCHC 11/01/2023 33.5  31.5 - 35.7 g/dL Final    RDW 11/01/2023 13.7  12.3 - 15.4 % Final    Platelets 11/01/2023 264  140 - 450 10*3/mm3 Final    Neutrophil Rel % 11/01/2023 59.1  42.7 - 76.0 % Final    Lymphocyte Rel % 11/01/2023 32.1  19.6 - 45.3 % Final    Monocyte Rel % 11/01/2023 6.7  5.0 - 12.0 % Final    Eosinophil Rel % 11/01/2023 1.7  0.3 - 6.2 % Final    Basophil Rel % 11/01/2023 0.2  0.0 - 1.5 % Final    Neutrophils Absolute 11/01/2023 4.94  1.70 - 7.00 10*3/mm3 Final    Lymphocytes Absolute 11/01/2023 2.68  0.70 - 3.10 10*3/mm3 Final    Monocytes Absolute 11/01/2023 0.56  0.10 - 0.90 10*3/mm3 Final    Eosinophils Absolute 11/01/2023 0.14  0.00 - 0.40 10*3/mm3 Final    Basophils Absolute 11/01/2023 0.02  0.00 - 0.20 10*3/mm3 Final    Immature Granulocyte Rel % 11/01/2023 0.2  0.0 - 0.5 % Final    Immature Grans Absolute 11/01/2023 0.02  0.00 - 0.05 10*3/mm3 Final    nRBC 11/01/2023 0.0  0.0 - 0.2 /100 WBC Final    Glucose 11/01/2023 135 (H)  65 - 99 mg/dL Final    BUN 11/01/2023 15  8 - 23 mg/dL Final    Creatinine 11/01/2023 1.09  0.76 - 1.27 mg/dL Final    EGFR Result 11/01/2023 70.3  >60.0 mL/min/1.73 Final    Comment: GFR Normal >60  Chronic Kidney Disease <60  Kidney Failure <15  The GFR formula is only valid for adults with stable renal  function between ages 18 and 70.      BUN/Creatinine Ratio 11/01/2023 13.8  7.0 - 25.0 Final    Sodium 11/01/2023 138  136 - 145 mmol/L Final    Potassium 11/01/2023 4.5  3.5 - 5.2 mmol/L Final     Chloride 11/01/2023 102  98 - 107 mmol/L Final    Total CO2 11/01/2023 27.1  22.0 - 29.0 mmol/L Final    Calcium 11/01/2023 9.8  8.6 - 10.5 mg/dL Final    Total Protein 11/01/2023 7.5  6.0 - 8.5 g/dL Final    Albumin 11/01/2023 4.2  3.5 - 5.2 g/dL Final    Globulin 11/01/2023 3.3  gm/dL Final    A/G Ratio 11/01/2023 1.3  g/dL Final    Total Bilirubin 11/01/2023 1.0  0.0 - 1.2 mg/dL Final    Alkaline Phosphatase 11/01/2023 114  39 - 117 U/L Final    AST (SGOT) 11/01/2023 24  1 - 40 U/L Final    ALT (SGPT) 11/01/2023 22  1 - 41 U/L Final    Total Cholesterol 11/01/2023 153  0 - 200 mg/dL Final    Comment: Cholesterol Reference Ranges  (U.S. Department of Health and Human Services ATP III  Classifications)  Desirable          <200 mg/dL  Borderline High    200-239 mg/dL  High Risk          >240 mg/dL  Triglyceride Reference Ranges  (U.S. Department of Health and Human Services ATP III  Classifications)  Normal           <150 mg/dL  Borderline High  150-199 mg/dL  High             200-499 mg/dL  Very High        >500 mg/dL  HDL Reference Ranges  (U.S. Department of Health and Human Services ATP III  Classifications)  Low     <40 mg/dl (major risk factor for CHD)  High    >60 mg/dl ('negative' risk factor for CHD)  LDL Reference Ranges  (U.S. Department of Health and Human Services ATP III  Classifications)  Optimal          <100 mg/dL  Near Optimal     100-129 mg/dL  Borderline High  130-159 mg/dL  High             160-189 mg/dL  Very High        >189 mg/dL      Triglycerides 11/01/2023 118  0 - 150 mg/dL Final    HDL Cholesterol 11/01/2023 38 (L)  40 - 60 mg/dL Final    VLDL Cholesterol Vamsi 11/01/2023 21  5 - 40 mg/dL Final    LDL Chol Calc (NIH) 11/01/2023 94  0 - 100 mg/dL Final    TSH 11/01/2023 2.990  0.270 - 4.200 uIU/mL Final    25 Hydroxy, Vitamin D 11/01/2023 47.6  30.0 - 100.0 ng/ml Final    Comment: Reference Range for Total Vitamin D 25(OH)  Deficiency <20.0 ng/mL  Insufficiency 21-29 ng/mL  Sufficiency   ng/mL  Toxicity >100 ng/ml      Microalbumin, Urine 11/01/2023 10.0  Not Estab. ug/mL Final    Uric Acid 11/01/2023 7.1 (H)  3.4 - 7.0 mg/dL Final       Radiographic Studies  No Images in the past 120 days found..    I have reviewed the above labs and imaging.     PVR  Post-void residual performed with ultrasound by staff and interpreted by me - 0 mL     Assessment / Plan      Assessment:   Diagnoses and all orders for this visit:    1. Benign prostatic hyperplasia with lower urinary tract symptoms, symptom details unspecified (Primary)  -     PSA Diagnostic; Future  -     POC Urinalysis Dipstick, Automated         78 y.o. male presents for BPH with LUTS.  Patient has history of elevated PSA in the past with prostate biopsy with Dr. Glaser per patient report.  Patient's last OV was in 2022 and was to follow up PRN.  Patient reports around 2 weeks ago dysuria with weak stream.  Reports it has improved some.  He was seen by PCP a week ago and urine was clear.  He does report a lot of coffee intake.  He has since increased his water intake.  He has been trying to get over 100 ounces of water per day.  He was formerly on finasteride and was discontinued by us.  He has been taking doxazosin for years.  He was restarted on finasteride last week.      UA today does not reveal any s/s of infection and urine culture by PCP showed no growth.  Likely related to little water intake and excessive intake of bladder irritants.  Patient will continue to increase water intake and decrease bladder irritants.      Patient's IPSS has worsened to 28 with primary symptoms of intermittency, urgency, weak stream and nocturia x5.  He does not recall having a BPH workup in the past.  Recommend BPH workup.  Discussed cysto, TRUS, and uroflow and patient agreed to proceed.      No recent PSA, will obtain today.    Plan:    Follow up with Dr. Yoon for BPH workup  PSA today.      Follow Up:   Return for for cysto, TRUS, uroflow, KJ, and  JOSE MARIA, with Dr. Yoon.      POP Nguyễn  [facility]

## 2024-06-05 ENCOUNTER — TELEPHONE (OUTPATIENT)
Dept: INTERNAL MEDICINE | Facility: CLINIC | Age: 79
End: 2024-06-05
Payer: MEDICARE

## 2024-06-05 DIAGNOSIS — I10 ESSENTIAL HYPERTENSION: Primary | ICD-10-CM

## 2024-06-05 DIAGNOSIS — E87.1 HYPONATREMIA: Primary | ICD-10-CM

## 2024-06-05 DIAGNOSIS — I10 ESSENTIAL HYPERTENSION: ICD-10-CM

## 2024-06-05 RX ORDER — LISINOPRIL 20 MG/1
20 TABLET ORAL DAILY
Qty: 90 TABLET | Refills: 1 | Status: SHIPPED | OUTPATIENT
Start: 2024-06-05

## 2024-06-05 NOTE — TELEPHONE ENCOUNTER
Still hyponatremic with Na level 135 and now down to 132. Still on hctz 12.5- lisinopril 10mg daily. Serum osmolality <280. Could be due to thiazide diuretic hctz. Will stop hctz and increase lisinopril 20mg daily. Repeat BMP in 6 weeks.

## 2024-06-06 NOTE — PROGRESS NOTES
Attempted to contact patient regarding elevated PSA.  Patient will need to come in for 4K.  Left voicemail. 
Spoke with his wife and he is scheduled for a 4k draw on Tuesday 6/11, nurse visit
No

## 2024-06-11 ENCOUNTER — LAB (OUTPATIENT)
Dept: UROLOGY | Facility: CLINIC | Age: 79
End: 2024-06-11
Payer: MEDICARE

## 2024-06-11 DIAGNOSIS — N40.0 BENIGN NON-NODULAR PROSTATIC HYPERPLASIA: ICD-10-CM

## 2024-06-11 RX ORDER — FINASTERIDE 5 MG/1
5 TABLET, FILM COATED ORAL DAILY
Qty: 90 TABLET | Refills: 3 | Status: SHIPPED | OUTPATIENT
Start: 2024-06-11

## 2024-06-17 ENCOUNTER — TELEPHONE (OUTPATIENT)
Dept: UROLOGY | Facility: CLINIC | Age: 79
End: 2024-06-17
Payer: MEDICARE

## 2024-06-17 NOTE — TELEPHONE ENCOUNTER
I called and left  for patient to inform patient of appt on 6/27 @ 10:50 am with Cindy Lindsay.  Okay to release to [facility]

## 2024-06-27 ENCOUNTER — TELEPHONE (OUTPATIENT)
Dept: UROLOGY | Facility: CLINIC | Age: 79
End: 2024-06-27

## 2024-06-27 ENCOUNTER — OFFICE VISIT (OUTPATIENT)
Dept: UROLOGY | Facility: CLINIC | Age: 79
End: 2024-06-27
Payer: MEDICARE

## 2024-06-27 VITALS
BODY MASS INDEX: 36.53 KG/M2 | HEIGHT: 68 IN | HEART RATE: 65 BPM | SYSTOLIC BLOOD PRESSURE: 132 MMHG | OXYGEN SATURATION: 95 % | WEIGHT: 241 LBS | DIASTOLIC BLOOD PRESSURE: 74 MMHG

## 2024-06-27 DIAGNOSIS — N32.81 OVERACTIVE BLADDER: ICD-10-CM

## 2024-06-27 DIAGNOSIS — N40.1 BENIGN PROSTATIC HYPERPLASIA WITH LOWER URINARY TRACT SYMPTOMS, SYMPTOM DETAILS UNSPECIFIED: ICD-10-CM

## 2024-06-27 DIAGNOSIS — R97.20 ELEVATED PROSTATE SPECIFIC ANTIGEN (PSA): Primary | ICD-10-CM

## 2024-06-27 PROBLEM — E78.5 DYSLIPIDEMIA: Status: ACTIVE | Noted: 2022-03-20

## 2024-06-27 PROBLEM — K21.9 GASTROESOPHAGEAL REFLUX DISEASE: Status: ACTIVE | Noted: 2022-03-20

## 2024-06-27 PROBLEM — H33.21 RIGHT RETINAL DETACHMENT: Status: ACTIVE | Noted: 2017-03-07

## 2024-06-27 RX ORDER — OXYBUTYNIN CHLORIDE 10 MG/1
10 TABLET, EXTENDED RELEASE ORAL DAILY
Qty: 30 TABLET | Refills: 2 | Status: SHIPPED | OUTPATIENT
Start: 2024-06-27

## 2024-06-27 RX ORDER — MAGNESIUM HYDROXIDE 1200 MG/15ML
1 LIQUID ORAL ONCE
Qty: 1 ENEMA | Refills: 0 | Status: SHIPPED | OUTPATIENT
Start: 2024-06-27 | End: 2024-06-27

## 2024-06-27 RX ORDER — CIPROFLOXACIN 500 MG/1
500 TABLET, FILM COATED ORAL 2 TIMES DAILY
Qty: 6 TABLET | Refills: 0 | Status: SHIPPED | OUTPATIENT
Start: 2024-06-27

## 2024-06-27 NOTE — PROGRESS NOTES
Office Visit Established Male Patient     Patient Name: John Leyva  : 1945   MRN: [id number]     Chief Complaint:   Chief Complaint   Patient presents with    Follow-up     Follow up appt for 4k score results.       History of Present Illness: Mr. John Leyva is a 78 y.o. male who presents today for follow up with history of BPH and elevated PSA. He is here today to discuss his 4K score.      He would also like to discuss his LUTS today.  He reports that he is tired of going to the bathroom so frequently.  Admits to daytime urinary frequency >8 times and 4-5 times per night.  He does not snore.      Denies UTI symptoms today.      Subjective      Review of System: ROS reviewed by me and is negative unless otherwise noted in HPI.      Past Medical History:   Past Medical History:   Diagnosis Date    Arthritis     Bell's palsy 10/19/2022    1974    BPH (benign prostatic hyperplasia)     Cataract, bilateral     s/p surgery    Diabetes mellitus     no medications     ED (erectile dysfunction)     Elevated cholesterol     Elevated PSA     Gout     Hypertension     Hypogonadism in male     Hypothyroidism     no medications    Impaired functional mobility, balance, gait, and endurance     Low kidney function     Retinal detachment     Wears glasses        Past Surgical History:   Past Surgical History:   Procedure Laterality Date    APPENDECTOMY      COLONOSCOPY      over 5 years ago  Dr Yen    COLONOSCOPY N/A 10/20/2022    Procedure: COLONOSCOPY with polypectomy;  Surgeon: Nii Longoria MD;  Location: [facility] ENDOSCOPY;  Service: Gastroenterology;  Laterality: N/A;    ELBOW OLECRANNON BURSA EXCISION Right     EYE SURGERY Bilateral     cataracts removed    LUMBAR LAMINECTOMY DISCECTOMY DECOMPRESSION Right 2020    Procedure: LUMBAR LAMINECTOMY L4-5, HEMILAMINECTOMY L3-4 RIGHT;  Surgeon: Paulie Daniels MD;  Location: [facility] OR;  Service: Neurosurgery;  Laterality: Right;    RETINAL  DETACHMENT SURGERY Right     x3 surgeries    SKIN BIOPSY      benign    TOTAL HIP ARTHROPLASTY Right 2021    Procedure: Elective total hip arthroplasty;  Surgeon: Jose Carlson MD;  Location: [facility];  Service: Orthopedics;  Laterality: Right;       Family History:   Family History   Problem Relation Age of Onset    Cancer Mother     Cancer Father         Bone cancer    Cancer Other     Colon cancer Neg Hx     Liver cancer Neg Hx     Rectal cancer Neg Hx     Stomach cancer Neg Hx        Social History:   Social History     Socioeconomic History    Marital status:      Spouse name: Yolanda   Tobacco Use    Smoking status: Former     Current packs/day: 0.00     Average packs/day: 3.0 packs/day for 10.0 years (30.0 ttl pk-yrs)     Types: Cigarettes     Start date:      Quit date:      Years since quittin.5    Smokeless tobacco: Never   Vaping Use    Vaping status: Never Used   Substance and Sexual Activity    Alcohol use: Not Currently    Drug use: No    Sexual activity: Not Currently     Partners: Male, Female       Medications:     Current Outpatient Medications:     allopurinol (ZYLOPRIM) 100 MG tablet, TAKE ONE TABLET BY MOUTH EVERY DAY, Disp: 90 tablet, Rfl: 1    amLODIPine (NORVASC) 5 MG tablet, Take 1 tablet by mouth Daily., Disp: 90 tablet, Rfl: 1    atorvastatin (LIPITOR) 20 MG tablet, Take 1 tablet by mouth Every Evening., Disp: 90 tablet, Rfl: 1    doxazosin (CARDURA) 2 MG tablet, Take 1 tablet by mouth every night at bedtime., Disp: 90 tablet, Rfl: 0    DULoxetine (CYMBALTA) 30 MG capsule, Take 1 capsule by mouth Daily., Disp: 90 capsule, Rfl: 1    finasteride (PROSCAR) 5 MG tablet, Take 1 tablet by mouth Daily., Disp: 90 tablet, Rfl: 3    lisinopril (PRINIVIL,ZESTRIL) 20 MG tablet, Take 1 tablet by mouth Daily., Disp: 90 tablet, Rfl: 1    metoprolol succinate XL (TOPROL-XL) 50 MG 24 hr tablet, TAKE ONE TABLET BY MOUTH EVERY DAY, Disp: 90 tablet, Rfl: 1    Multiple  "Vitamins-Minerals (MULTIVITAMIN ADULT PO), Take 1 tablet by mouth Daily., Disp: , Rfl:     ciprofloxacin (Cipro) 500 MG tablet, Take 1 tablet by mouth 2 (Two) Times a Day. Start taking the day prior to biopsy, Disp: 6 tablet, Rfl: 0    oxybutynin XL (Ditropan XL) 10 MG 24 hr tablet, Take 1 tablet by mouth Daily., Disp: 30 tablet, Rfl: 2    sodium phosphate (FLEET) 7-19 GM/118ML enema, Insert 1 enema into the rectum 1 (One) Time for 1 dose. Perform the morning before the biopsy, Disp: 1 enema, Rfl: 0    Allergies:   No Known Allergies    Objective     Physical Exam:   Vital Signs:   Vitals:    06/27/24 1121   BP: 132/74   Pulse: 65   SpO2: 95%   Weight: 109 kg (241 lb)   Height: 171.5 cm (67.52\")     Body mass index is 37.17 kg/m².   Physical Exam  Vitals and nursing note reviewed.   Constitutional:       General: He is not in acute distress.     Appearance: Normal appearance. He is obese. He is not ill-appearing.   HENT:      Head: Normocephalic and atraumatic.   Pulmonary:      Effort: Pulmonary effort is normal.   Skin:     General: Skin is warm and dry.   Neurological:      General: No focal deficit present.      Mental Status: He is alert and oriented to person, place, and time.   Psychiatric:         Mood and Affect: Mood normal.         Behavior: Behavior normal.        Labs  Brief Urine Lab Results  (Last result in the past 365 days)        Color   Clarity   Blood   Leuk Est   Nitrite   Protein   CREAT   Urine HCG        06/04/24 1019 Yellow   Clear   Negative   Negative   Negative   Negative                   Lab Results   Component Value Date    GLUCOSE 119 (H) 06/04/2024    CALCIUM 9.8 06/04/2024     (L) 06/04/2024    K 4.4 06/04/2024    CO2 24.4 06/04/2024    CL 98 06/04/2024    BUN 15 06/04/2024    CREATININE 0.92 06/04/2024    EGFRIFAFRI 79 10/04/2021    EGFRIFNONA 65 10/04/2021    BCR 16.3 06/04/2024    ANIONGAP 9.6 06/04/2024       Lab Results   Component Value Date    WBC 8.94 06/04/2024    HGB " 15.9 06/04/2024    HCT 47.2 06/04/2024    MCV 90.9 06/04/2024     06/04/2024       Urine Culture          5/30/2024    16:55   Urine Culture   Urine Culture Final report         Radiographic Studies  No Images in the past 120 days found.    I have reviewed the above labs and imaging.     PVR= 0 ml.      Assessment / Plan      Assessment/Plan: Mr. John Leyva is a 78 y.o. male who presents today for follow up with history of BPH and elevated PSA. He is here today to discuss his 4K score.  His 4K score is 41.4 which correlates to approximately 49.4% chance that an aggressive prostate cancer would be found if a biopsy were performed.      With shared discussion conversation I have recommended that Mr. Leyva have a prostate MRI and prostate biopsy performed.  I explained that sampling error can occur with any biopsy and there is a risk of potentially missing a cancer that may be present.    I discussed the risks, benefits, and alternatives to prostate biopsy, including hematuria, hematochezia, and hematospermia.  I also discussed the risk of diagnosing a clinically-insignificant prostate cancer.  I discussed the risks of sepsis, which can be minimized by prophylactic antibiotics. Patient is agreeable to plan.      Mr. Leyva also complains of BPH with LUTS and overactive bladder symptoms.  His IPSS is 25 with his most bothersome symptoms are urgency and frequency. PVR is 0 ml.  I have started him on Oxybutynin.  Side effects discussed at length.  Will have Mr. Leyva follow up with Dr. Yoon for TRUS biopsy after prostate MRI is complete.      Diagnoses and all orders for this visit:    1. Elevated prostate specific antigen (PSA) (Primary)  -     LABS SCANNED  -     MRI Prostate With & Without Contrast; Future  -     sodium phosphate (FLEET) 7-19 GM/118ML enema; Insert 1 enema into the rectum 1 (One) Time for 1 dose. Perform the morning before the biopsy  Dispense: 1 enema; Refill: 0  -     Culture, Routine - Swab,  Per Rectum  -     ciprofloxacin (Cipro) 500 MG tablet; Take 1 tablet by mouth 2 (Two) Times a Day. Start taking the day prior to biopsy  Dispense: 6 tablet; Refill: 0    2. Overactive bladder  -     oxybutynin XL (Ditropan XL) 10 MG 24 hr tablet; Take 1 tablet by mouth Daily.  Dispense: 30 tablet; Refill: 2    3. Benign prostatic hyperplasia with lower urinary tract symptoms, symptom details unspecified      Follow Up:   Return for with Dr. Yoon for TRUS prostate biopsy .      Cindy Lindsay, APRN, MSN, FNP-C  [facility]

## 2024-06-27 NOTE — TELEPHONE ENCOUNTER
Hub staff attempted to follow warm transfer process and was unsuccessful     Caller: ADRIENNE MARIE    Relationship to patient: SELF    Best call back number: [phone] (home)       Patient is needing: PT CALLING [facility] TO LET KNOW THAT US WAS SCHEDULED AND WANTED TO SCHEDULE FOLLOW UP AFTER US DONE. WOULD ALSO LIKE TO KNOW IF HE SHOULD KEEP CYSTO APPT COMING UP.

## 2024-06-28 NOTE — TELEPHONE ENCOUNTER
I spoke with him and found out his MRI is scheduled for 7/10/24, and I advised that he does keep the appointment he already has with Dr. Yoon on 7/22/24.  He verbalized understanding.

## 2024-07-02 LAB
BACTERIA SPEC CULT: ABNORMAL
MICROORGANISM/AGENT SPEC: ABNORMAL
OTHER ANTIBIOTIC SUSC ISLT: ABNORMAL

## 2024-07-10 ENCOUNTER — HOSPITAL ENCOUNTER (OUTPATIENT)
Dept: MRI IMAGING | Facility: HOSPITAL | Age: 79
Discharge: HOME | End: 2024-07-10
Admitting: NURSE PRACTITIONER
Payer: MEDICARE

## 2024-07-10 DIAGNOSIS — R97.20 ELEVATED PROSTATE SPECIFIC ANTIGEN (PSA): ICD-10-CM

## 2024-07-10 PROCEDURE — 72197 MRI PELVIS W/O & W/DYE: CPT

## 2024-07-10 PROCEDURE — A9577 INJ MULTIHANCE: HCPCS | Performed by: NURSE PRACTITIONER

## 2024-07-10 PROCEDURE — 0 GADOBENATE DIMEGLUMINE 529 MG/ML SOLUTION: Performed by: NURSE PRACTITIONER

## 2024-07-10 RX ADMIN — GADOBENATE DIMEGLUMINE 20 ML: 529 INJECTION, SOLUTION INTRAVENOUS at 19:13

## 2024-07-22 ENCOUNTER — PROCEDURE VISIT (OUTPATIENT)
Dept: UROLOGY | Facility: CLINIC | Age: 79
End: 2024-07-22
Payer: MEDICARE

## 2024-07-22 DIAGNOSIS — N40.1 BENIGN PROSTATIC HYPERPLASIA WITH LOWER URINARY TRACT SYMPTOMS, SYMPTOM DETAILS UNSPECIFIED: Primary | ICD-10-CM

## 2024-07-22 DIAGNOSIS — N32.81 OVERACTIVE BLADDER: ICD-10-CM

## 2024-07-22 DIAGNOSIS — R97.20 ELEVATED PROSTATE SPECIFIC ANTIGEN (PSA): ICD-10-CM

## 2024-07-22 PROCEDURE — 99214 OFFICE O/P EST MOD 30 MIN: CPT | Performed by: UROLOGY

## 2024-07-22 PROCEDURE — 52000 CYSTOURETHROSCOPY: CPT | Performed by: UROLOGY

## 2024-07-22 RX ORDER — OXYBUTYNIN CHLORIDE 10 MG/1
10 TABLET, EXTENDED RELEASE ORAL DAILY
Qty: 90 TABLET | Refills: 4 | Status: SHIPPED | OUTPATIENT
Start: 2024-07-22

## 2024-07-22 NOTE — PROGRESS NOTES
CC  LUTS / BPH Workup    HPI  Ms. Leyva is a 79 y.o. male with history below in assessment, who presents for follow up.     At this visit patient is here for BPH workup and discussion.     Past Medical History:   Diagnosis Date    Arthritis     Bell's palsy 10/19/2022    1974    BPH (benign prostatic hyperplasia)     Cataract, bilateral     s/p surgery    Diabetes mellitus     no medications     ED (erectile dysfunction)     Elevated cholesterol     Elevated PSA     Gout     Hypertension     Hypogonadism in male     Hypothyroidism     no medications    Impaired functional mobility, balance, gait, and endurance     Low kidney function     Retinal detachment     Wears glasses        Past Surgical History:   Procedure Laterality Date    APPENDECTOMY      COLONOSCOPY      over 5 years ago  Dr Yen    COLONOSCOPY N/A 10/20/2022    Procedure: COLONOSCOPY with polypectomy;  Surgeon: Nii Longoria MD;  Location: [facility] ENDOSCOPY;  Service: Gastroenterology;  Laterality: N/A;    ELBOW OLECRANNON BURSA EXCISION Right     EYE SURGERY Bilateral     cataracts removed    LUMBAR LAMINECTOMY DISCECTOMY DECOMPRESSION Right 06/04/2020    Procedure: LUMBAR LAMINECTOMY L4-5, HEMILAMINECTOMY L3-4 RIGHT;  Surgeon: Paulie Daniels MD;  Location: [facility] OR;  Service: Neurosurgery;  Laterality: Right;    RETINAL DETACHMENT SURGERY Right     x3 surgeries    SKIN BIOPSY      benign    TOTAL HIP ARTHROPLASTY Right 04/13/2021    Procedure: Elective total hip arthroplasty;  Surgeon: Jose Carlson MD;  Location: [facility] OR;  Service: Orthopedics;  Laterality: Right;         Current Outpatient Medications:     allopurinol (ZYLOPRIM) 100 MG tablet, TAKE ONE TABLET BY MOUTH EVERY DAY, Disp: 90 tablet, Rfl: 1    amLODIPine (NORVASC) 5 MG tablet, Take 1 tablet by mouth Daily., Disp: 90 tablet, Rfl: 1    atorvastatin (LIPITOR) 20 MG tablet, Take 1 tablet by mouth Every Evening., Disp: 90 tablet, Rfl: 1    ciprofloxacin (Cipro) 500 MG  tablet, Take 1 tablet by mouth 2 (Two) Times a Day. Start taking the day prior to biopsy, Disp: 6 tablet, Rfl: 0    doxazosin (CARDURA) 2 MG tablet, Take 1 tablet by mouth every night at bedtime., Disp: 90 tablet, Rfl: 0    DULoxetine (CYMBALTA) 30 MG capsule, Take 1 capsule by mouth Daily., Disp: 90 capsule, Rfl: 1    finasteride (PROSCAR) 5 MG tablet, Take 1 tablet by mouth Daily., Disp: 90 tablet, Rfl: 3    lisinopril (PRINIVIL,ZESTRIL) 20 MG tablet, Take 1 tablet by mouth Daily., Disp: 90 tablet, Rfl: 1    metoprolol succinate XL (TOPROL-XL) 50 MG 24 hr tablet, TAKE ONE TABLET BY MOUTH EVERY DAY, Disp: 90 tablet, Rfl: 1    Multiple Vitamins-Minerals (MULTIVITAMIN ADULT PO), Take 1 tablet by mouth Daily., Disp: , Rfl:     oxybutynin XL (Ditropan XL) 10 MG 24 hr tablet, Take 1 tablet by mouth Daily., Disp: 30 tablet, Rfl: 2     Physical Exam  There were no vitals taken for this visit.    Labs  Brief Urine Lab Results  (Last result in the past 365 days)        Color   Clarity   Blood   Leuk Est   Nitrite   Protein   CREAT   Urine HCG        06/04/24 1019 Yellow   Clear   Negative   Negative   Negative   Negative                   Lab Results   Component Value Date    GLUCOSE 119 (H) 06/04/2024    CALCIUM 9.8 06/04/2024     (L) 06/04/2024    K 4.4 06/04/2024    CO2 24.4 06/04/2024    CL 98 06/04/2024    BUN 15 06/04/2024    CREATININE 0.92 06/04/2024    EGFRIFAFRI 79 10/04/2021    EGFRIFNONA 65 10/04/2021    BCR 16.3 06/04/2024    ANIONGAP 9.6 06/04/2024       Lab Results   Component Value Date    WBC 8.94 06/04/2024    HGB 15.9 06/04/2024    HCT 47.2 06/04/2024    MCV 90.9 06/04/2024     06/04/2024       Urine Culture          5/30/2024    16:55   Urine Culture   Urine Culture Final report         Lab Results   Component Value Date    PSA 5.300 (H) 06/04/2024    PSA 5.300 (H) 06/04/2024    PSA 2.770 04/05/2022       Radiographic Studies  MRI Prostate With & Without Contrast  Result Date:  7/12/2024  Impression: 1. No PI-RADS 3 or greater lesion. 2. No pelvic lymphadenopathy. 3. Marked prostatomegaly with calculated volume of 158 mL. Overall PI-RADS 2 exam. Electronically Signed: Ross Caal MD  7/12/2024 5:52 PM EDT  Workstation ID: [id number]      I have reviewed above labs and imaging.     CYSTOSCOPY  Preprocedure diagnosis  LUTS  Postprocedure diagnosis  Same  Procedure  Flexible Cystourethroscopy  Attending surgeon  Gee Yoon MD  Anesthesia  2% lidocaine jelly intraurethrally  Complications  None  Indications  79 y.o. male undergoing a flexible cystoscopy for the above mentioned indications.  Informed consent was obtained.    Findings  Cystoscopic findings included one right and left ureteral orifice in the normal anatomic position with normal bladder mucosa and no tumors, masses or stones. The urethral urothelium was within normal limits with no strictures.  There was not a prominent median lobe.  The lateral lobes were very large and long and obstructive in appearance.    Procedure  The patient was placed in supine position and prepped and draped in sterile fashion with lidocaine jelly per urethra for anesthesia.  A timeout was performed.  The 14F flexible cystoscope was lubricated and gently placed through the penile urethra and into the bladder.  The bladder was completely visualized.  The cystoscope was retroflexed and the bladder neck and prostate visualized.  The cystoscope was slowly withdrawn while visualizing the urethra and the procedure terminated.  The patient tolerated the procedure well.          I personally reviewed  and interpreted this study.       Assessment  79 y.o. male with improvement of chronic lower urinary tract symptoms on doxazosin, finasteride, which he just started, and oxybutynin.     In a separate room and encounter after his workup, we mutually made the decision to continue his triple medication regimen, since he is doing well but is happy about this.   We also discussed that his prostate was incredibly large and there were no PI-RADS 3 lesions to suggest prostate cancer, therefore we made the decision to hold off on biopsy.    Plan  1.  Continue 3 medications.  I have refilled the oxybutynin.  Risks with this medication are cognitive dysfunction, constipation, dry eye.  2.  Follow-up in 1 year with PSA prior, IPSS, and PVR with Renetta

## 2024-08-24 RX ORDER — DOXAZOSIN 2 MG/1
2 TABLET ORAL
Qty: 90 TABLET | Refills: 0 | Status: SHIPPED | OUTPATIENT
Start: 2024-08-24

## 2024-10-17 ENCOUNTER — HOSPITAL ENCOUNTER (OUTPATIENT)
Dept: GENERAL RADIOLOGY | Facility: HOSPITAL | Age: 79
Discharge: HOME | End: 2024-10-17
Admitting: NURSE PRACTITIONER
Payer: MEDICARE

## 2024-10-17 ENCOUNTER — OFFICE VISIT (OUTPATIENT)
Dept: INTERNAL MEDICINE | Facility: CLINIC | Age: 79
End: 2024-10-17
Payer: MEDICARE

## 2024-10-17 VITALS
HEIGHT: 68 IN | DIASTOLIC BLOOD PRESSURE: 78 MMHG | BODY MASS INDEX: 35.01 KG/M2 | HEART RATE: 55 BPM | WEIGHT: 231 LBS | SYSTOLIC BLOOD PRESSURE: 136 MMHG | TEMPERATURE: 97.3 F | RESPIRATION RATE: 16 BRPM | OXYGEN SATURATION: 94 %

## 2024-10-17 DIAGNOSIS — N18.2 CKD (CHRONIC KIDNEY DISEASE) STAGE 2, GFR 60-89 ML/MIN: ICD-10-CM

## 2024-10-17 DIAGNOSIS — I10 ESSENTIAL HYPERTENSION: ICD-10-CM

## 2024-10-17 DIAGNOSIS — L97.921 ULCER OF LEFT LOWER EXTREMITY, LIMITED TO BREAKDOWN OF SKIN: Primary | ICD-10-CM

## 2024-10-17 DIAGNOSIS — E11.65 TYPE 2 DIABETES MELLITUS WITH HYPERGLYCEMIA, WITHOUT LONG-TERM CURRENT USE OF INSULIN: ICD-10-CM

## 2024-10-17 DIAGNOSIS — E78.2 MIXED HYPERLIPIDEMIA: ICD-10-CM

## 2024-10-17 DIAGNOSIS — N40.0 BENIGN NON-NODULAR PROSTATIC HYPERPLASIA: ICD-10-CM

## 2024-10-17 LAB
EXPIRATION DATE: ABNORMAL
HBA1C MFR BLD: 5.9 % (ref 4.5–5.7)
Lab: ABNORMAL

## 2024-10-17 PROCEDURE — 3078F DIAST BP <80 MM HG: CPT | Performed by: NURSE PRACTITIONER

## 2024-10-17 PROCEDURE — 83036 HEMOGLOBIN GLYCOSYLATED A1C: CPT | Performed by: NURSE PRACTITIONER

## 2024-10-17 PROCEDURE — 99214 OFFICE O/P EST MOD 30 MIN: CPT | Performed by: NURSE PRACTITIONER

## 2024-10-17 PROCEDURE — 1160F RVW MEDS BY RX/DR IN RCRD: CPT | Performed by: NURSE PRACTITIONER

## 2024-10-17 PROCEDURE — 73590 X-RAY EXAM OF LOWER LEG: CPT

## 2024-10-17 PROCEDURE — 3044F HG A1C LEVEL LT 7.0%: CPT | Performed by: NURSE PRACTITIONER

## 2024-10-17 PROCEDURE — 1126F AMNT PAIN NOTED NONE PRSNT: CPT | Performed by: NURSE PRACTITIONER

## 2024-10-17 PROCEDURE — 3075F SYST BP GE 130 - 139MM HG: CPT | Performed by: NURSE PRACTITIONER

## 2024-10-17 PROCEDURE — 1159F MED LIST DOCD IN RCRD: CPT | Performed by: NURSE PRACTITIONER

## 2024-10-17 NOTE — PROGRESS NOTES
Office Visit      Patient Name: John Leyva  : 1945   MRN: [id number]   Care Team: Patient Care Team:  Eli Greenwood MD as PCP - General (Family Medicine)    Chief Complaint  Hypertension, Hyperlipidemia, and Wound Infection (Pt says he had a skin infection from [facility] Dr. Joana Armstrong. He was taking minocycline. Pt says its doing better. )    Subjective     Subjective      John Leyva presents to [facility] for chronic disease management and skin problem.  Hypertension-she is taking lisinopril, metoprolol, and amlodipine as prescribed.  He denies chest pain, shortness of breath, orthopnea, lower extremity swelling, and dizziness.  BPH-now currently being managed by urology, had elevated PSA at 1 point an MRI with PI-RADS 2 findings.  He is being actively monitored and will follow up with urology in 1 year.  His symptoms are stable with doxazosin, finasteride, and oxybutynin.  He may be gets up 2 times at night to urinate.  He has no pain with urination or blood in the urine.  HLD-he is compliant with atorvastatin.  Denies dark urine or myalgia.  Type 2 diabetes-A1c has never been greater than 7.0, he is not currently on any medications.  He denies any polyuria, polydipsia, polyphasia, vision changes, or numbness/tingling of the feet.  He does have a poor healing ulceration on the left ankle.  Dermatology has been managing this.  He has completed a course of ciprofloxacin which was not helpful and now he is taking a course of minocycline x 2 weeks.  This lesion has been present for 3 months.  He tells me he is also using a topical cream that has not helped.  Prior to treatment the area was erythematous and painful now has a large scab with some surrounding inflammation.  He has not had any x-ray imaging studies.  He has never been diagnosed with any vascular disease.  He is an active man and spends a lot of time on his feet.  He does have some color  "discrepancies between his 2 lower extremities, there was no significant erythema or warmth to this area at this time.  He does see nephrology for chronic kidney disease, GFR is stable.  Objective     Objective   Vital Signs:   /78   Pulse 55   Temp 97.3 °F (36.3 °C)   Resp 16   Ht 172.7 cm (68\")   Wt 105 kg (231 lb)   SpO2 94%   BMI 35.12 kg/m²     Physical Exam  Vitals and nursing note reviewed.   Constitutional:       General: He is not in acute distress.     Appearance: Normal appearance. He is obese. He is not toxic-appearing.   Eyes:      Pupils: Pupils are equal, round, and reactive to light.   Neck:      Vascular: No carotid bruit.   Cardiovascular:      Rate and Rhythm: Normal rate and regular rhythm.      Pulses:           Dorsalis pedis pulses are 1+ on the right side and 1+ on the left side.      Heart sounds: Normal heart sounds. No murmur heard.     Comments: Christiano discoloration of lower extremities with pale bilaterally  Pulmonary:      Effort: Pulmonary effort is normal. No respiratory distress.      Breath sounds: Normal breath sounds. No wheezing.   Abdominal:      General: Bowel sounds are normal. There is no distension.      Palpations: Abdomen is soft.      Tenderness: There is no abdominal tenderness.   Musculoskeletal:         General: Deformity present.      Cervical back: Neck supple. No tenderness.      Right lower leg: No edema.      Left lower leg: No edema.   Skin:     General: Skin is warm and dry.      Findings: No rash.   Neurological:      General: No focal deficit present.      Mental Status: He is alert and oriented to person, place, and time.   Psychiatric:         Mood and Affect: Mood normal.         Behavior: Behavior normal.          Assessment / Plan      Assessment & Plan   Problem List Items Addressed This Visit       Type 2 diabetes mellitus with hyperglycemia    Relevant Orders    POC Glycosylated Hemoglobin (Hb A1C) (Completed)    Lab Results   Component " Value Date    HGBA1C 5.9 (A) 10/17/2024     Controlled with diet. Recommend high protein low carbohydrate diet.     Essential hypertension    Stable. Continue current medications as prescribed. Recommend DASH diet, moderate-intensity exercises 4-5 times/week, medication compliance, and home blood pressure monitoring.       CKD (chronic kidney disease) stage 2, GFR 60-89 ml/min    Managed by nephrology, keep scheduled follow-up. Avoid NSAIDs and push fluids.      Other Visit Diagnoses       Ulcer of left lower extremity, limited to breakdown of skin    -  Primary    Relevant Orders    XR tibia and fibula 1 vw left    Due to poor healing obtain x-ray. I suspect this is a venous ulceration. Referral to wound care also placed. Keep clean and dry . Discussed worsening to return with.     Benign non-nodular prostatic hyperplasia       Managed by urology and stable. Continue current medication regimen and keep scheduled follow-up.      Mixed hyperlipidemia        Lipid panel UTD. Continue statin and atorvastatin.           Follow Up   Return in about 6 months (around 4/17/2025) for Medicare Wellness.  Patient was given instructions and counseling regarding his condition or for health maintenance advice. Please see specific information pulled into the AVS if appropriate.     POP Lewis  [facility]

## 2024-10-21 RX ORDER — DULOXETIN HYDROCHLORIDE 30 MG/1
30 CAPSULE, DELAYED RELEASE ORAL DAILY
Qty: 90 CAPSULE | Refills: 1 | COMMUNITY
Start: 2024-10-21 | End: 2024-10-21

## 2024-10-26 DIAGNOSIS — E78.2 MIXED HYPERLIPIDEMIA: ICD-10-CM

## 2024-10-28 RX ORDER — ATORVASTATIN CALCIUM 20 MG/1
20 TABLET, FILM COATED ORAL EVERY EVENING
Qty: 90 TABLET | Refills: 1 | Status: SHIPPED | OUTPATIENT
Start: 2024-10-28

## 2024-11-01 RX ORDER — DULOXETIN HYDROCHLORIDE 30 MG/1
30 CAPSULE, DELAYED RELEASE ORAL DAILY
Qty: 90 CAPSULE | Refills: 1 | OUTPATIENT
Start: 2024-11-01

## 2024-11-06 ENCOUNTER — TELEPHONE (OUTPATIENT)
Dept: INTERNAL MEDICINE | Facility: CLINIC | Age: 79
End: 2024-11-06
Payer: MEDICARE

## 2024-11-06 RX ORDER — DULOXETIN HYDROCHLORIDE 30 MG/1
30 CAPSULE, DELAYED RELEASE ORAL DAILY
COMMUNITY
End: 2024-11-06 | Stop reason: ALTCHOICE

## 2024-11-06 RX ORDER — DULOXETIN HYDROCHLORIDE 30 MG/1
30 CAPSULE, DELAYED RELEASE ORAL DAILY
Qty: 90 CAPSULE | Refills: 1 | Status: SHIPPED | OUTPATIENT
Start: 2024-11-06

## 2024-11-12 RX ORDER — ALLOPURINOL 100 MG/1
TABLET ORAL
Qty: 90 TABLET | Refills: 1 | Status: SHIPPED | OUTPATIENT
Start: 2024-11-12

## 2024-11-21 DIAGNOSIS — I10 ESSENTIAL HYPERTENSION: ICD-10-CM

## 2024-11-21 RX ORDER — DOXAZOSIN 2 MG/1
2 TABLET ORAL
Qty: 90 TABLET | Refills: 1 | Status: SHIPPED | OUTPATIENT
Start: 2024-11-21

## 2024-11-21 RX ORDER — AMLODIPINE BESYLATE 5 MG/1
5 TABLET ORAL DAILY
Qty: 90 TABLET | Refills: 1 | Status: SHIPPED | OUTPATIENT
Start: 2024-11-21

## 2024-11-25 DIAGNOSIS — E87.1 HYPONATREMIA: ICD-10-CM

## 2024-11-25 DIAGNOSIS — I10 ESSENTIAL HYPERTENSION: ICD-10-CM

## 2024-11-25 RX ORDER — LISINOPRIL 20 MG/1
20 TABLET ORAL DAILY
Qty: 90 TABLET | Refills: 1 | Status: SHIPPED | OUTPATIENT
Start: 2024-11-25

## 2025-04-21 DIAGNOSIS — E78.2 MIXED HYPERLIPIDEMIA: ICD-10-CM

## 2025-04-21 RX ORDER — ATORVASTATIN CALCIUM 20 MG/1
20 TABLET, FILM COATED ORAL EVERY EVENING
Qty: 90 TABLET | Refills: 1 | Status: SHIPPED | OUTPATIENT
Start: 2025-04-21

## 2025-05-06 RX ORDER — ALLOPURINOL 100 MG/1
100 TABLET ORAL DAILY
Qty: 90 TABLET | Refills: 1 | Status: SHIPPED | OUTPATIENT
Start: 2025-05-06

## 2025-05-09 RX ORDER — DULOXETIN HYDROCHLORIDE 30 MG/1
30 CAPSULE, DELAYED RELEASE ORAL DAILY
Qty: 90 CAPSULE | Refills: 1 | Status: SHIPPED | OUTPATIENT
Start: 2025-05-09

## 2025-05-09 NOTE — TELEPHONE ENCOUNTER
Caller: Yolanda Leyva    Relationship: Emergency Contact    Best call back number: [phone]     Requested Prescriptions:   Requested Prescriptions     Pending Prescriptions Disp Refills    DULoxetine (CYMBALTA) 30 MG capsule 90 capsule 1     Sig: Take 1 capsule by mouth Daily.        Pharmacy where request should be sent: [facility] [address] [phone] [facility] [phone]      Last office visit with prescribing clinician: 5/30/2024   Last telemedicine visit with prescribing clinician: Visit date not found   Next office visit with prescribing clinician: Visit date not found     Additional details provided by patient: PATIENT IS COMPLETELY OUT OF MEDICATION     Does the patient have less than a 3 day supply:  [x] Yes  [] No    Would you like a call back once the refill request has been completed: [] Yes [x] No    If the office needs to give you a call back, can they leave a voicemail: [] Yes [x] No    Martin Schumacher Rep   05/09/25 09:59 EDT

## 2025-05-13 DIAGNOSIS — I10 ESSENTIAL HYPERTENSION: ICD-10-CM

## 2025-05-13 RX ORDER — AMLODIPINE BESYLATE 5 MG/1
5 TABLET ORAL DAILY
Qty: 30 TABLET | Refills: 0 | Status: SHIPPED | OUTPATIENT
Start: 2025-05-13

## 2025-05-13 NOTE — TELEPHONE ENCOUNTER
Rx Refill Note  Wellness scheduled for 5/20/2025     Requested Prescriptions     Pending Prescriptions Disp Refills    amLODIPine (NORVASC) 5 MG tablet [Pharmacy Med Name: amlodipine 5 mg tablet] 90 tablet 1     Sig: TAKE ONE TABLET BY MOUTH DAILY      Last office visit with prescribing clinician: 5/30/2024   Last telemedicine visit with prescribing clinician: Visit date not found   Next office visit with prescribing clinician: Visit date not found                         Would you like a call back once the refill request has been completed: [] Yes [] No    If the office needs to give you a call back, can they leave a voicemail: [] Yes [] No    Itzel Tyler, JOSE C  05/13/25, 10:52 EDT

## 2025-05-19 DIAGNOSIS — I10 ESSENTIAL HYPERTENSION: ICD-10-CM

## 2025-05-19 DIAGNOSIS — E87.1 HYPONATREMIA: ICD-10-CM

## 2025-05-19 RX ORDER — DOXAZOSIN 2 MG/1
2 TABLET ORAL
Qty: 90 TABLET | Refills: 1 | Status: SHIPPED | OUTPATIENT
Start: 2025-05-19

## 2025-05-19 RX ORDER — LISINOPRIL 20 MG/1
20 TABLET ORAL DAILY
Qty: 90 TABLET | Refills: 1 | Status: SHIPPED | OUTPATIENT
Start: 2025-05-19 | End: 2025-05-20 | Stop reason: SDUPTHER

## 2025-05-20 ENCOUNTER — OFFICE VISIT (OUTPATIENT)
Dept: INTERNAL MEDICINE | Facility: CLINIC | Age: 80
End: 2025-05-20
Payer: MEDICARE

## 2025-05-20 VITALS
OXYGEN SATURATION: 94 % | TEMPERATURE: 98.4 F | SYSTOLIC BLOOD PRESSURE: 154 MMHG | WEIGHT: 237 LBS | BODY MASS INDEX: 35.92 KG/M2 | RESPIRATION RATE: 20 BRPM | DIASTOLIC BLOOD PRESSURE: 75 MMHG | HEIGHT: 68 IN | HEART RATE: 76 BPM

## 2025-05-20 DIAGNOSIS — E78.5 DYSLIPIDEMIA: ICD-10-CM

## 2025-05-20 DIAGNOSIS — M48.062 SPINAL STENOSIS OF LUMBAR REGION WITH NEUROGENIC CLAUDICATION: ICD-10-CM

## 2025-05-20 DIAGNOSIS — E11.65 TYPE 2 DIABETES MELLITUS WITH HYPERGLYCEMIA, WITHOUT LONG-TERM CURRENT USE OF INSULIN: Primary | ICD-10-CM

## 2025-05-20 DIAGNOSIS — M10.00 IDIOPATHIC GOUT, UNSPECIFIED CHRONICITY, UNSPECIFIED SITE: ICD-10-CM

## 2025-05-20 DIAGNOSIS — R53.83 OTHER FATIGUE: ICD-10-CM

## 2025-05-20 DIAGNOSIS — I10 ESSENTIAL HYPERTENSION: ICD-10-CM

## 2025-05-20 DIAGNOSIS — Z00.00 ANNUAL VISIT FOR GENERAL ADULT MEDICAL EXAMINATION WITHOUT ABNORMAL FINDINGS: ICD-10-CM

## 2025-05-20 DIAGNOSIS — N40.0 BENIGN PROSTATIC HYPERPLASIA, UNSPECIFIED WHETHER LOWER URINARY TRACT SYMPTOMS PRESENT: ICD-10-CM

## 2025-05-20 PROBLEM — R53.82 CHRONIC FATIGUE: Status: ACTIVE | Noted: 2019-08-21

## 2025-05-20 PROBLEM — R30.0 DYSURIA: Status: RESOLVED | Noted: 2024-05-30 | Resolved: 2025-05-20

## 2025-05-20 LAB
EXPIRATION DATE: ABNORMAL
EXPIRATION DATE: NORMAL
HBA1C MFR BLD: 6.4 % (ref 4.5–5.7)
Lab: ABNORMAL
Lab: NORMAL
POC ALBUMIN, URINE: 10 MG/L
POC CREATININE, URINE: 10 MG/DL
POC URINE ALB/CREA RATIO: NORMAL

## 2025-05-20 RX ORDER — ALLOPURINOL 100 MG/1
100 TABLET ORAL DAILY
Qty: 90 TABLET | Refills: 1 | Status: SHIPPED | OUTPATIENT
Start: 2025-05-20

## 2025-05-20 RX ORDER — AMLODIPINE BESYLATE 5 MG/1
5 TABLET ORAL DAILY
Qty: 30 TABLET | Refills: 0 | Status: SHIPPED | OUTPATIENT
Start: 2025-05-20

## 2025-05-20 RX ORDER — DULOXETIN HYDROCHLORIDE 30 MG/1
30 CAPSULE, DELAYED RELEASE ORAL DAILY
Qty: 90 CAPSULE | Refills: 1 | Status: SHIPPED | OUTPATIENT
Start: 2025-05-20

## 2025-05-20 RX ORDER — METOPROLOL SUCCINATE 50 MG/1
TABLET, EXTENDED RELEASE ORAL
Qty: 90 TABLET | Refills: 1 | Status: SHIPPED | OUTPATIENT
Start: 2025-05-20

## 2025-05-20 RX ORDER — CHOLECALCIFEROL (VITAMIN D3) 10 MCG
CAPSULE ORAL
COMMUNITY

## 2025-05-20 RX ORDER — ATORVASTATIN CALCIUM 20 MG/1
20 TABLET, FILM COATED ORAL EVERY EVENING
Qty: 90 TABLET | Refills: 1 | Status: SHIPPED | OUTPATIENT
Start: 2025-05-20

## 2025-05-20 RX ORDER — LISINOPRIL 20 MG/1
20 TABLET ORAL DAILY
Qty: 90 TABLET | Refills: 1 | Status: SHIPPED | OUTPATIENT
Start: 2025-05-20

## 2025-05-20 NOTE — ASSESSMENT & PLAN NOTE
Diet controlled  A1c normal for age  Continue to monitor    Orders:    POC Albumin/Creatinine Ratio Urine    POC Glycosylated Hemoglobin (Hb A1C)

## 2025-05-20 NOTE — ASSESSMENT & PLAN NOTE
Stable on current medication and dosage. Will continue current management. Refill medication as necessary.  Amlodipine 5, metoprolol 50 and lisinopril 20 daily    Orders:    amLODIPine (NORVASC) 5 MG tablet; Take 1 tablet by mouth Daily.    lisinopril (PRINIVIL,ZESTRIL) 20 MG tablet; Take 1 tablet by mouth Daily.    metoprolol succinate XL (TOPROL-XL) 50 MG 24 hr tablet; TAKE ONE TABLET BY MOUTH EVERY DAY

## 2025-05-20 NOTE — ASSESSMENT & PLAN NOTE
Have abs today  Stable on current medication and dosage. Will continue current management. Refill medication as necessary.    Orders:    Uric Acid    allopurinol (ZYLOPRIM) 100 MG tablet; Take 1 tablet by mouth Daily.

## 2025-05-20 NOTE — ASSESSMENT & PLAN NOTE
Have labs done for now  Orders:    CBC (No Diff)    Basic Metabolic Panel    TSH Rfx On Abnormal To Free T4

## 2025-05-20 NOTE — ASSESSMENT & PLAN NOTE
Have labs done nonfasting  Stable on current medication and dosage. Will continue current management. Refill medication as necessary.  atorvastatin  Orders:    Lipid Panel    atorvastatin (LIPITOR) 20 MG tablet; Take 1 tablet by mouth Every Evening.

## 2025-05-20 NOTE — ASSESSMENT & PLAN NOTE
Stable on current medication and dosage. Will continue current management. Refill medication as necessary.    Orders:    DULoxetine (CYMBALTA) 30 MG capsule; Take 1 capsule by mouth Daily.

## 2025-05-20 NOTE — PROGRESS NOTES
Subjective   The ABCs of the Annual Wellness Visit  Medicare Wellness Visit      John Leyva is a 79 y.o. patient who presents for a Medicare Wellness Visit.  HTN on lisinopril-hctz and metoprolol  HLD on statin, last labs reviewed, normal LDL 94  Dm type 2; last A1c was 6.3. A1c today 6.4 diet controlled not on any medications  BPH asymptomatic, on finasteride and doxazosin  Gout: on allopurinol, stable at this time, no flare ups for 8-10 years now  CKD 2: Follows with nephrology     Colonoscopy: 2022 polyps, diverticulosis, repeat in 7 years  AAA screen normal on 2017  PSA normal (2022)    The following portions of the patient's history were reviewed and   updated as appropriate: allergies, current medications, past family history, past medical history, past social history, past surgical history, and problem list.    Compared to one year ago, the patient's physical   health is better.  Compared to one year ago, the patient's mental   health is better.    Recent Hospitalizations:  He was not admitted to the hospital during the last year.     Current Medical Providers:  Patient Care Team:  Eli Greenwood MD as PCP - General (Family Medicine)    Outpatient Medications Prior to Visit   Medication Sig Dispense Refill    Cholecalciferol (EQL Vitamin D3) 50 MCG (2000 UT) capsule Take  by mouth.      doxazosin (CARDURA) 2 MG tablet TAKE ONE TABLET BY MOUTH AT BEDTIME 90 tablet 1    finasteride (PROSCAR) 5 MG tablet Take 1 tablet by mouth Daily. 90 tablet 3    Multiple Vitamins-Minerals (MULTIVITAMIN ADULT PO) Take 1 tablet by mouth Daily.      oxybutynin XL (Ditropan XL) 10 MG 24 hr tablet Take 1 tablet by mouth Daily. 90 tablet 4    allopurinol (ZYLOPRIM) 100 MG tablet take 1 tablet by mouth once daily 90 tablet 1    amLODIPine (NORVASC) 5 MG tablet TAKE ONE TABLET BY MOUTH DAILY 30 tablet 0    atorvastatin (LIPITOR) 20 MG tablet TAKE ONE TABLET BY MOUTH EVERY EVENING 90 tablet 1    DULoxetine (CYMBALTA) 30 MG  "capsule Take 1 capsule by mouth Daily. 90 capsule 1    lisinopril (PRINIVIL,ZESTRIL) 20 MG tablet TAKE ONE TABLET BY MOUTH EVERY DAY 90 tablet 1    metoprolol succinate XL (TOPROL-XL) 50 MG 24 hr tablet TAKE ONE TABLET BY MOUTH EVERY DAY 90 tablet 1     No facility-administered medications prior to visit.     No opioid medication identified on active medication list. I have reviewed chart for other potential  high risk medication/s and harmful drug interactions in the elderly.      Aspirin is not on active medication list.  Aspirin use is not indicated based on review of current medical condition/s. Risk of harm outweighs potential benefits.  .    Patient Active Problem List   Diagnosis    Vitamin D deficiency    Type 2 diabetes mellitus with hyperglycemia    Primary gout    Essential hypertension    Other fatigue    Spinal stenosis of lumbar region with neurogenic claudication    Primary osteoarthritis of right hip    Ventral hernia without obstruction or gangrene    Status post total hip replacement, right    CKD (chronic kidney disease) stage 2, GFR 60-89 ml/min    BPH (benign prostatic hyperplasia)    Spondylolisthesis    Sciatica    Hyponatremia    Dyslipidemia    Gastroesophageal reflux disease    Heart failure    High prostate specific antigen (PSA)    Lumbar spondylosis    Right retinal detachment     Advance Care Planning Advance Directive is not on file.  ACP discussion was held with the patient during this visit. Patient does not have an advance directive, information provided.            Objective   Vitals:    05/20/25 1608   BP: 154/75   Pulse: 76   Resp: 20   Temp: 98.4 °F (36.9 °C)   TempSrc: Infrared   SpO2: 94%   Weight: 108 kg (237 lb)   Height: 172.7 cm (67.99\")       Estimated body mass index is 36.04 kg/m² as calculated from the following:    Height as of this encounter: 172.7 cm (67.99\").    Weight as of this encounter: 108 kg (237 lb).    Class 2 Severe Obesity (BMI >=35 and <=39.9). " Obesity-related health conditions include the following: hypertension, diabetes mellitus, and dyslipidemias. Obesity is unchanged. BMI is is above average; BMI management plan is completed. We discussed portion control and increasing exercise.           Does the patient have evidence of cognitive impairment? No  Lab Results   Component Value Date    HGBA1C 6.4 (A) 2025                                                                                                Health  Risk Assessment    Smoking Status:  Social History     Tobacco Use   Smoking Status Former    Current packs/day: 0.00    Average packs/day: 3.0 packs/day for 10.0 years (30.0 ttl pk-yrs)    Types: Cigarettes    Start date:     Quit date:     Years since quittin.4   Smokeless Tobacco Never     Alcohol Consumption:  Social History     Substance and Sexual Activity   Alcohol Use Not Currently       Fall Risk Screen  STEADI Fall Risk Assessment was completed, and patient is at HIGH risk for falls. Assessment completed on:2025    Depression Screening   Little interest or pleasure in doing things? Not at all   Feeling down, depressed, or hopeless? Not at all   PHQ-2 Total Score 0      Health Habits and Functional and Cognitive Screenin/20/2025     4:06 PM   Functional & Cognitive Status   Do you have difficulty preparing food and eating? No   Do you have difficulty bathing yourself, getting dressed or grooming yourself? No   Do you have difficulty using the toilet? No   Do you have difficulty moving around from place to place? No   Do you have trouble with steps or getting out of a bed or a chair? No   Current Diet Well Balanced Diet   Dental Exam Not up to date   Eye Exam Not up to date   Exercise (times per week) 7 times per week   Current Exercises Include Walking   Do you need help using the phone?  No   Are you deaf or do you have serious difficulty hearing?  No   Do you need help to go to places out of walking  distance? No   Do you need help shopping? No   Do you need help preparing meals?  No   Do you need help with housework?  No   Do you need help with laundry? No   Do you need help taking your medications? No   Do you need help managing money? No   Do you ever drive or ride in a car without wearing a seat belt? No   Have you felt unusual stress, anger or loneliness in the last month? No   Who do you live with? Spouse   If you need help, do you have trouble finding someone available to you? No   Have you been bothered in the last four weeks by sexual problems? No   Do you have difficulty concentrating, remembering or making decisions? No           Age-appropriate Screening Schedule:  Refer to the list below for future screening recommendations based on patient's age, sex and/or medical conditions. Orders for these recommended tests are listed in the plan section. The patient has been provided with a written plan.    Health Maintenance List  Health Maintenance   Topic Date Due    URINE MICROALBUMIN-CREATININE RATIO (uACR)  Never done    DIABETIC FOOT EXAM  04/06/2024    DIABETIC EYE EXAM  03/21/2025    LIPID PANEL  06/04/2025    ZOSTER VACCINE (1 of 2) 10/01/2025 (Originally 7/7/1997)    COVID-19 Vaccine (7 - 2024-25 season) 10/16/2025 (Originally 4/3/2025)    RSV Vaccine - Adults (1 - 1-dose 75+ series) 05/19/2026 (Originally 7/7/2022)    INFLUENZA VACCINE  07/01/2025    HEMOGLOBIN A1C  11/20/2025    ANNUAL WELLNESS VISIT  05/20/2026    COLORECTAL CANCER SCREENING  10/20/2029    TDAP/TD VACCINES (3 - Td or Tdap) 01/24/2035    HEPATITIS C SCREENING  Completed    Pneumococcal Vaccine 50+  Completed                                                                                                                                                CMS Preventative Services Quick Reference  Risk Factors Identified During Encounter  Dental Screening Recommended  Vision Screening Recommended    The above risks/problems have been  "discussed with the patient.  Pertinent information has been shared with the patient in the After Visit Summary.  An After Visit Summary and PPPS were made available to the patient.    Follow Up:   Next Medicare Wellness visit to be scheduled in 1 year.         Additional E&M Note during same encounter follows:  Patient has additional, significant, and separately identifiable condition(s)/problem(s) that require work above and beyond the Medicare Wellness Visit     Chief Complaint  Medicare Wellness-subsequent (Annual Medicare Wellness Visit/)    Subjective   HPI  oJhn is also being seen today for an annual adult preventative physical exam.  and John is also being seen today for additional medical problem/s.  Tiredness and fatigue for a while now  No dyspnea, chest pain or fever              Objective   Vital Signs:  /75   Pulse 76   Temp 98.4 °F (36.9 °C) (Infrared)   Resp 20   Ht 172.7 cm (67.99\")   Wt 108 kg (237 lb)   SpO2 94%   BMI 36.04 kg/m²   Physical Exam  Vitals and nursing note reviewed.   Constitutional:       Appearance: Normal appearance.   HENT:      Head: Normocephalic and atraumatic.   Cardiovascular:      Rate and Rhythm: Normal rate and regular rhythm.      Pulses: Normal pulses.      Heart sounds: Normal heart sounds.   Pulmonary:      Effort: Pulmonary effort is normal. No respiratory distress.      Breath sounds: Normal breath sounds. No wheezing, rhonchi or rales.   Abdominal:      General: Abdomen is flat. Bowel sounds are normal.      Palpations: Abdomen is soft.      Tenderness: There is no abdominal tenderness. There is no guarding.   Musculoskeletal:      Cervical back: Neck supple.   Skin:     General: Skin is warm.      Capillary Refill: Capillary refill takes less than 2 seconds.   Neurological:      General: No focal deficit present.      Mental Status: He is alert. Mental status is at baseline.   Psychiatric:         Mood and Affect: Mood normal.         Behavior: " Behavior normal.                    Assessment and Plan      Type 2 diabetes mellitus with hyperglycemia, without long-term current use of insulin  Diet controlled  A1c normal for age  Continue to monitor    Orders:    POC Albumin/Creatinine Ratio Urine    POC Glycosylated Hemoglobin (Hb A1C)    Essential hypertension  Stable on current medication and dosage. Will continue current management. Refill medication as necessary.  Amlodipine 5, metoprolol 50 and lisinopril 20 daily    Orders:    amLODIPine (NORVASC) 5 MG tablet; Take 1 tablet by mouth Daily.    lisinopril (PRINIVIL,ZESTRIL) 20 MG tablet; Take 1 tablet by mouth Daily.    metoprolol succinate XL (TOPROL-XL) 50 MG 24 hr tablet; TAKE ONE TABLET BY MOUTH EVERY DAY    Dyslipidemia  Have labs done nonfasting  Stable on current medication and dosage. Will continue current management. Refill medication as necessary.  atorvastatin  Orders:    Lipid Panel    atorvastatin (LIPITOR) 20 MG tablet; Take 1 tablet by mouth Every Evening.    Benign prostatic hyperplasia, unspecified whether lower urinary tract symptoms present  Follows urology, does have hx of high PSA       Idiopathic gout, unspecified chronicity, unspecified site  Have abs today  Stable on current medication and dosage. Will continue current management. Refill medication as necessary.    Orders:    Uric Acid    allopurinol (ZYLOPRIM) 100 MG tablet; Take 1 tablet by mouth Daily.    Spinal stenosis of lumbar region with neurogenic claudication  Stable on current medication and dosage. Will continue current management. Refill medication as necessary.    Orders:    DULoxetine (CYMBALTA) 30 MG capsule; Take 1 capsule by mouth Daily.    Annual visit for general adult medical examination without abnormal findings    Orders:    CBC (No Diff)    Basic Metabolic Panel    Lipid Panel    TSH Rfx On Abnormal To Free T4    Uric Acid    Other fatigue  Have labs done for now  Orders:    CBC (No Diff)    Basic Metabolic  Panel    TSH Rfx On Abnormal To Free T4            Follow Up   No follow-ups on file.  Patient was given instructions and counseling regarding his condition or for health maintenance advice. Please see specific information pulled into the AVS if appropriate.

## 2025-05-21 ENCOUNTER — RESULTS FOLLOW-UP (OUTPATIENT)
Dept: INTERNAL MEDICINE | Facility: CLINIC | Age: 80
End: 2025-05-21
Payer: MEDICARE

## 2025-05-21 LAB
BUN SERPL-MCNC: 12 MG/DL (ref 8–27)
BUN/CREAT SERPL: 13 (ref 10–24)
CALCIUM SERPL-MCNC: 9.3 MG/DL (ref 8.6–10.2)
CHLORIDE SERPL-SCNC: 99 MMOL/L (ref 96–106)
CHOLEST SERPL-MCNC: 138 MG/DL (ref 100–199)
CO2 SERPL-SCNC: 19 MMOL/L (ref 20–29)
CREAT SERPL-MCNC: 0.95 MG/DL (ref 0.76–1.27)
EGFRCR SERPLBLD CKD-EPI 2021: 82 ML/MIN/1.73
ERYTHROCYTE [DISTWIDTH] IN BLOOD BY AUTOMATED COUNT: 12.9 % (ref 11.6–15.4)
GLUCOSE SERPL-MCNC: 107 MG/DL (ref 70–99)
HCT VFR BLD AUTO: 46.9 % (ref 37.5–51)
HDLC SERPL-MCNC: 38 MG/DL
HGB BLD-MCNC: 15.6 G/DL (ref 13–17.7)
LDLC SERPL CALC-MCNC: 71 MG/DL (ref 0–99)
MCH RBC QN AUTO: 30.6 PG (ref 26.6–33)
MCHC RBC AUTO-ENTMCNC: 33.3 G/DL (ref 31.5–35.7)
MCV RBC AUTO: 92 FL (ref 79–97)
PLATELET # BLD AUTO: 243 X10E3/UL (ref 150–450)
POTASSIUM SERPL-SCNC: 4.6 MMOL/L (ref 3.5–5.2)
RBC # BLD AUTO: 5.1 X10E6/UL (ref 4.14–5.8)
SODIUM SERPL-SCNC: 134 MMOL/L (ref 134–144)
TRIGL SERPL-MCNC: 168 MG/DL (ref 0–149)
TSH SERPL DL<=0.005 MIU/L-ACNC: 2.16 UIU/ML (ref 0.45–4.5)
URATE SERPL-MCNC: 5.9 MG/DL (ref 3.8–8.4)
VLDLC SERPL CALC-MCNC: 29 MG/DL (ref 5–40)
WBC # BLD AUTO: 9.3 X10E3/UL (ref 3.4–10.8)

## 2025-06-01 DIAGNOSIS — N40.0 BENIGN NON-NODULAR PROSTATIC HYPERPLASIA: ICD-10-CM

## 2025-06-02 RX ORDER — FINASTERIDE 5 MG/1
5 TABLET, FILM COATED ORAL DAILY
Qty: 90 TABLET | Refills: 3 | Status: SHIPPED | OUTPATIENT
Start: 2025-06-02

## 2025-06-20 DIAGNOSIS — I10 ESSENTIAL HYPERTENSION: ICD-10-CM

## 2025-06-20 RX ORDER — AMLODIPINE BESYLATE 5 MG/1
5 TABLET ORAL DAILY
Qty: 30 TABLET | Refills: 0 | Status: SHIPPED | OUTPATIENT
Start: 2025-06-20

## 2025-07-16 ENCOUNTER — TELEPHONE (OUTPATIENT)
Dept: UROLOGY | Facility: CLINIC | Age: 80
End: 2025-07-16
Payer: MEDICARE

## 2025-07-16 NOTE — TELEPHONE ENCOUNTER
I called and left  for patient to remind him to get PSA lab drawn.  If patient calls back OK TO RELEASE HUB: please go the [facility] outpatient lab at least one day before appt on 7/22 and get PSA blood draw.

## 2025-07-18 ENCOUNTER — LAB (OUTPATIENT)
Dept: LAB | Facility: HOSPITAL | Age: 80
End: 2025-07-18
Payer: MEDICARE

## 2025-07-18 DIAGNOSIS — R97.20 ELEVATED PROSTATE SPECIFIC ANTIGEN (PSA): ICD-10-CM

## 2025-07-18 LAB — PSA SERPL-MCNC: 1.68 NG/ML (ref 0–4)

## 2025-07-18 PROCEDURE — 36415 COLL VENOUS BLD VENIPUNCTURE: CPT

## 2025-07-18 PROCEDURE — 84153 ASSAY OF PSA TOTAL: CPT

## 2025-07-19 DIAGNOSIS — I10 ESSENTIAL HYPERTENSION: ICD-10-CM

## 2025-07-19 RX ORDER — AMLODIPINE BESYLATE 5 MG/1
5 TABLET ORAL DAILY
Qty: 90 TABLET | Refills: 2 | Status: SHIPPED | OUTPATIENT
Start: 2025-07-19

## 2025-07-19 NOTE — TELEPHONE ENCOUNTER
Rx Refill Note  Requested Prescriptions     Pending Prescriptions Disp Refills    amLODIPine (NORVASC) 5 MG tablet [Pharmacy Med Name: amlodipine 5 mg tablet] 30 tablet 0     Sig: TAKE ONE TABLET BY MOUTH DAILY      Last office visit with prescribing clinician: 5/20/2025   Last telemedicine visit with prescribing clinician: Visit date not found   Next office visit with prescribing clinician: Visit date not found       Adrianne Baez MA  07/19/25, 11:20 EDT

## 2025-07-22 ENCOUNTER — OFFICE VISIT (OUTPATIENT)
Dept: UROLOGY | Facility: CLINIC | Age: 80
End: 2025-07-22
Payer: MEDICARE

## 2025-07-22 VITALS
BODY MASS INDEX: 35.34 KG/M2 | SYSTOLIC BLOOD PRESSURE: 160 MMHG | DIASTOLIC BLOOD PRESSURE: 100 MMHG | HEART RATE: 64 BPM | TEMPERATURE: 97.3 F | HEIGHT: 68 IN | OXYGEN SATURATION: 94 % | RESPIRATION RATE: 15 BRPM | WEIGHT: 233.2 LBS

## 2025-07-22 DIAGNOSIS — N40.0 BENIGN NON-NODULAR PROSTATIC HYPERPLASIA: ICD-10-CM

## 2025-07-22 DIAGNOSIS — I10 ESSENTIAL HYPERTENSION: ICD-10-CM

## 2025-07-22 DIAGNOSIS — Z87.898 HISTORY OF ELEVATED PROSTATE SPECIFIC ANTIGEN (PSA): ICD-10-CM

## 2025-07-22 DIAGNOSIS — N32.81 OVERACTIVE BLADDER: Primary | ICD-10-CM

## 2025-07-22 PROCEDURE — 3077F SYST BP >= 140 MM HG: CPT | Performed by: NURSE PRACTITIONER

## 2025-07-22 PROCEDURE — 81003 URINALYSIS AUTO W/O SCOPE: CPT | Performed by: NURSE PRACTITIONER

## 2025-07-22 PROCEDURE — 3080F DIAST BP >= 90 MM HG: CPT | Performed by: NURSE PRACTITIONER

## 2025-07-22 PROCEDURE — 99214 OFFICE O/P EST MOD 30 MIN: CPT | Performed by: NURSE PRACTITIONER

## 2025-07-22 PROCEDURE — 51798 US URINE CAPACITY MEASURE: CPT | Performed by: NURSE PRACTITIONER

## 2025-07-22 PROCEDURE — 1159F MED LIST DOCD IN RCRD: CPT | Performed by: NURSE PRACTITIONER

## 2025-07-22 PROCEDURE — 1160F RVW MEDS BY RX/DR IN RCRD: CPT | Performed by: NURSE PRACTITIONER

## 2025-07-22 PROCEDURE — G2211 COMPLEX E/M VISIT ADD ON: HCPCS | Performed by: NURSE PRACTITIONER

## 2025-07-22 RX ORDER — FINASTERIDE 5 MG/1
5 TABLET, FILM COATED ORAL DAILY
Qty: 90 TABLET | Refills: 3 | Status: SHIPPED | OUTPATIENT
Start: 2025-07-22

## 2025-07-22 RX ORDER — OXYBUTYNIN CHLORIDE 10 MG/1
10 TABLET, EXTENDED RELEASE ORAL DAILY
Qty: 90 TABLET | Refills: 3 | Status: SHIPPED | OUTPATIENT
Start: 2025-07-22

## 2025-07-22 NOTE — PROGRESS NOTES
Office Visit     Patient Name: John Leyva  : 1945   MRN: [id number]   Patient or patient representative verbalized consent for the use of Ambient Listening during the visit with  POP Mcguire for chart documentation. 2025  08:06 EDT    Chief Complaint   Patient presents with    Elevated PSA    Follow-up     yearly    Results     2025 psa      Referring Provider: No ref. provider found    Primary Care Provider: Eli Greenwood MD     History of Present Illness  Mr. Leyva is a very pleasant 80 year old male with history of elevated PSA, BPH and OAB.  Here for annual follow up appointment.     Prostate Issues  - He reports stable condition since his last visit  - Continuing with prostate and overactive bladder medications  - He is currently not sexually active but has a female partner when he is  - He is on finasteride, Cardura and Oxybutynin     IPSS Questionnaire (AUA-7):  Incomplete emptying  Over the past month, how often have you had a sensation of not emptying your bladder completely after you finished urinating?: Less than half the time (25)  Frequency  Over the past month, how often have you had to urinate again less than two hours after you finishied urinating ?: Less than half the time (25)  Intermittency  Over the past month, how often have you found you stopped and started again several time when you urinated ?: About half the time (25)  Urgency  Over the last month, how often have you found it difficult  have you found it difficult to postpone urination ?: About half the time (25)  Weak Stream  Over the past month, how often have you had a weak urinary stream ?: About half the time (25)  Straining  Over the past month, how often have you had to push or strain to begin urination ?: Not at all (25)  Nocturia  Over the past month, how many times did you most typically get up to urinate from the time you  went to bed until the time you got up in the morning ?: 2 times (07/22/25 0841)  Quality of life due to urinary symptoms  If you were to spend the rest of your life with your urinary condition the way it is now, how would feel about that?: Mostly satisfied (07/22/25 0841)    Scores  Total IPSS Score: (!) 15 (07/22/25 0841)  Total Score = Symptomatic Level: Moderately symptomatic: 8-19 (07/22/25 0841)       Subjective   Review of System: As noted in HPI.    Past Medical History:   Diagnosis Date    Arthritis     Bell's palsy 10/19/2022    1974    BPH (benign prostatic hyperplasia)     Cataract, bilateral     s/p surgery    Diabetes mellitus     no medications     ED (erectile dysfunction)     Elevated cholesterol     Elevated PSA     Gout     Hypertension     Hypogonadism in male     Hypothyroidism     no medications    Impaired functional mobility, balance, gait, and endurance     Low kidney function     Retinal detachment     Wears glasses      Past Surgical History:   Procedure Laterality Date    APPENDECTOMY      COLONOSCOPY      over 5 years ago  Dr Yen    COLONOSCOPY N/A 10/20/2022    Procedure: COLONOSCOPY with polypectomy;  Surgeon: Nii Longoria MD;  Location: [facility] ENDOSCOPY;  Service: Gastroenterology;  Laterality: N/A;    ELBOW OLECRANNON BURSA EXCISION Right     EYE SURGERY Bilateral     cataracts removed    LUMBAR LAMINECTOMY DISCECTOMY DECOMPRESSION Right 06/04/2020    Procedure: LUMBAR LAMINECTOMY L4-5, HEMILAMINECTOMY L3-4 RIGHT;  Surgeon: Paulie Daniels MD;  Location: [facility] OR;  Service: Neurosurgery;  Laterality: Right;    RETINAL DETACHMENT SURGERY Right     x3 surgeries    SKIN BIOPSY      benign    TOTAL HIP ARTHROPLASTY Right 04/13/2021    Procedure: Elective total hip arthroplasty;  Surgeon: Jose Carlson MD;  Location: [facility] OR;  Service: Orthopedics;  Laterality: Right;     Family History   Problem Relation Age of Onset    Cancer Mother     Cancer Father         Bone  cancer    Cancer Other     Colon cancer Neg Hx     Liver cancer Neg Hx     Rectal cancer Neg Hx     Stomach cancer Neg Hx      Social History     Socioeconomic History    Marital status:      Spouse name: Yolanda   Tobacco Use    Smoking status: Former     Current packs/day: 0.00     Average packs/day: 3.0 packs/day for 10.0 years (30.0 ttl pk-yrs)     Types: Cigarettes     Start date:      Quit date:      Years since quittin.5     Passive exposure: Never    Smokeless tobacco: Never   Vaping Use    Vaping status: Never Used   Substance and Sexual Activity    Alcohol use: Not Currently    Drug use: No    Sexual activity: Not Currently     Partners: Female       Current Outpatient Medications:     allopurinol (ZYLOPRIM) 100 MG tablet, Take 1 tablet by mouth Daily., Disp: 90 tablet, Rfl: 1    amLODIPine (NORVASC) 5 MG tablet, TAKE ONE TABLET BY MOUTH DAILY, Disp: 90 tablet, Rfl: 2    atorvastatin (LIPITOR) 20 MG tablet, Take 1 tablet by mouth Every Evening., Disp: 90 tablet, Rfl: 1    Cholecalciferol (EQL Vitamin D3) 50 MCG (2000) capsule, Take  by mouth., Disp: , Rfl:     doxazosin (CARDURA) 2 MG tablet, TAKE ONE TABLET BY MOUTH AT BEDTIME, Disp: 90 tablet, Rfl: 1    DULoxetine (CYMBALTA) 30 MG capsule, Take 1 capsule by mouth Daily., Disp: 90 capsule, Rfl: 1    finasteride (PROSCAR) 5 MG tablet, Take 1 tablet by mouth Daily., Disp: 90 tablet, Rfl: 3    lisinopril (PRINIVIL,ZESTRIL) 20 MG tablet, Take 1 tablet by mouth Daily., Disp: 90 tablet, Rfl: 1    metoprolol succinate XL (TOPROL-XL) 50 MG 24 hr tablet, TAKE ONE TABLET BY MOUTH EVERY DAY, Disp: 90 tablet, Rfl: 1    Multiple Vitamins-Minerals (MULTIVITAMIN ADULT PO), Take 1 tablet by mouth Daily., Disp: , Rfl:     oxybutynin XL (Ditropan XL) 10 MG 24 hr tablet, Take 1 tablet by mouth Daily., Disp: 90 tablet, Rfl: 3    No Known Allergies  Objective   Visit Vitals  /100 (BP Location: Left arm, Patient Position: Sitting, Cuff Size: Adult)  "  Pulse 64   Temp 97.3 °F (36.3 °C) (Temporal)   Resp 15   Ht 172.7 cm (67.99\")   Wt 106 kg (233 lb 3.2 oz)   SpO2 94%   BMI 35.47 kg/m²      Body mass index is 35.47 kg/m².   Physical Exam  Vitals and nursing note reviewed.   Constitutional:       General: He is not in acute distress.     Appearance: Normal appearance. He is obese. He is not ill-appearing.   HENT:      Head: Normocephalic and atraumatic.   Pulmonary:      Effort: Pulmonary effort is normal.   Skin:     General: Skin is warm and dry.   Neurological:      General: No focal deficit present.      Mental Status: He is alert and oriented to person, place, and time.   Psychiatric:         Mood and Affect: Mood normal.         Behavior: Behavior normal.     Labs  Lab Results   Component Value Date    COLORU Yellow 07/22/2025    CLARITYU Clear 07/22/2025    SPECGRAV 1.015 07/22/2025    PHUR 7.0 07/22/2025    LEUKOCYTESUR Negative 07/22/2025    NITRITE Negative 07/22/2025    PROTEINPOCUA Negative 07/22/2025    GLUCOSEUR Negative 07/22/2025    KETONESU Negative 07/22/2025    UROBILINOGEN 0.2 E.U./dL 07/22/2025    BILIRUBINUR Negative 07/22/2025    RBCUR Negative 07/22/2025      Lab Results   Component Value Date    WBC 9.3 05/20/2025    HGB 15.6 05/20/2025    HCT 46.9 05/20/2025    MCV 92 05/20/2025     05/20/2025     Lab Results   Component Value Date    GLUCOSE 107 (H) 05/20/2025    CALCIUM 9.3 05/20/2025     05/20/2025    K 4.6 05/20/2025    CO2 19 (L) 05/20/2025    CL 99 05/20/2025    BUN 12 05/20/2025    BUN 15 06/04/2024    CREATININE 0.95 05/20/2025    CREATININE 0.92 06/04/2024    EGFR 82 05/20/2025    EGFR 86.2 06/04/2024    BCR 13 05/20/2025    ANIONGAP 9.6 06/04/2024    ALT 21 06/04/2024    AST 23 06/04/2024     Lab Results   Component Value Date    HGBA1C 6.4 (A) 05/20/2025     Lab Results   Component Value Date    PSA 1.680 07/18/2025    PSA 5.300 (H) 06/04/2024    PSA 5.300 (H) 06/04/2024     No results found for: \"URICACIDSTN\", " "\"ZDGB4NREZEZ\", \"TJKN0ZXAHS\", \"LABMAGN\"  Lab Results   Component Value Date    KCCK96VV 48.5 08/23/2024    URICACID 5.9 05/20/2025     Lab Results   Component Value Date    LABPH 7.5 09/23/2019     Lab Results   Component Value Date    TESTFRE 6.6 06/13/2016    PSA 1.680 07/18/2025       Radiographic Studies  No Images in the past 120 days found..    I have reviewed the above labs and imaging.     PVR  Post-void residual performed with ultrasound by staff and interpreted by me - 32 mL    Assessment / Plan    Diagnoses and all orders for this visit:    1. Overactive bladder (Primary)  -     oxybutynin XL (Ditropan XL) 10 MG 24 hr tablet; Take 1 tablet by mouth Daily.  Dispense: 90 tablet; Refill: 3  -     POC Urinalysis Dipstick, Automated    2. Benign non-nodular prostatic hyperplasia  -     finasteride (PROSCAR) 5 MG tablet; Take 1 tablet by mouth Daily.  Dispense: 90 tablet; Refill: 3  -     PSA Diagnostic; Future    3. History of elevated prostate specific antigen (PSA)  -     PSA Diagnostic; Future    4. Essential hypertension       Assessment & Plan  1.OAB  - Continue Oxybutynin as prescribed.  Tolerating it well without side effects  - Refilled for 1 year    2. BPH   - PSA levels decreased to 3.36 when adjusted for finasteride   - Repeat PSA test next year  - Refilled oxybutynin and finasteride for 90 days    3. History of elevated PSA   - PSA has decreased to 1.690 when adjusted for finasteride is 3.36 ng/mL.    - He is overall pleased with this result.    - Prior prostate MRI was a PIRADS 2 exam and no biopsy was performed.    - Plan to repeat PSA in 1 year.      4. Hypertension  - BP is elevated.  Recommended close follow up with PCP.      Return in about 1 year (around 7/22/2026) for f/u with Cindy for annual visit; IPSS, PVR, UA, and PSA. .    Cindy Lindsay, MSN, APRN, FNP-C  [facility]    "

## (undated) DEVICE — GLV SURG PREMIERPRO MIC LTX PF SZ7.5 BRN

## (undated) DEVICE — TOOL 14MH30 LEGEND 14CM 3MM: Brand: MIDAS REX ™

## (undated) DEVICE — 1000 S-DRAPE TOWEL DRAPE 10/BX: Brand: STERI-DRAPE™

## (undated) DEVICE — VIOLET BRAIDED (POLYGLACTIN 910), SYNTHETIC ABSORBABLE SUTURE: Brand: COATED VICRYL

## (undated) DEVICE — PATIENT RETURN ELECTRODE, SINGLE-USE, CONTACT QUALITY MONITORING, ADULT, WITH 9FT CORD, FOR PATIENTS WEIGING OVER 33LBS. (15KG): Brand: MEGADYNE

## (undated) DEVICE — GLV SURG PREMIERPRO MIC LTX PF SZ6.5 BRN

## (undated) DEVICE — SINGLE-USE POLYPECTOMY SNARE: Brand: CAPTIVATOR II

## (undated) DEVICE — FLEXIBLE YANKAUER,MEDIUM TIP, NO VACUUM CONTROL: Brand: ARGYLE

## (undated) DEVICE — HYBRID TUBING/CAP SET FOR OLYMPUS® SCOPES: Brand: ERBE

## (undated) DEVICE — PILLW ABD SM

## (undated) DEVICE — Device

## (undated) DEVICE — JP PERF DRN SIL FLT 7MM FULL: Brand: CARDINAL HEALTH

## (undated) DEVICE — SUT VIC PLS CTD ANTIB BR 3/0 8/18IN 45CM

## (undated) DEVICE — 3M™ WARMING BLANKET, UPPER BODY, 10 PER CASE, 42268: Brand: BAIR HUGGER™

## (undated) DEVICE — ANTIBACTERIAL UNDYED BRAIDED (POLYGLACTIN 910), SYNTHETIC ABSORBABLE SUTURE: Brand: COATED VICRYL

## (undated) DEVICE — SUT VIC 2/0 CT1 27IN J259H

## (undated) DEVICE — CVR HNDL LT SURG ACCSSRY BLU STRL

## (undated) DEVICE — PK HIP GEN 20

## (undated) DEVICE — TUBING, SUCTION, 1/4" X 12', STRAIGHT: Brand: MEDLINE

## (undated) DEVICE — 3M™ STERI-DRAPE™ U-DRAPE 1015: Brand: STERI-DRAPE™

## (undated) DEVICE — ADHS LIQ MASTISOL 2/3ML

## (undated) DEVICE — ACCY PA700 LUBRICANT DIFFUSER MR7 4 PACK: Brand: MIDAS REX

## (undated) DEVICE — PK NEURO DISC 10

## (undated) DEVICE — SLV SCD CALF HEMOFORCE DVT THERP REPROC MD

## (undated) DEVICE — DRSNG WND BORDR/ADHS NONADHR/GZ LF 4X4IN STRL

## (undated) DEVICE — ENDOSCOPY PORT CONNECTOR FOR OLYMPUS® SCOPES: Brand: ERBE

## (undated) DEVICE — SUT ETHIB 5 V37 30IN MB66G

## (undated) DEVICE — DRSNG SURG AQUACEL AG 9X25CM

## (undated) DEVICE — PENCL ES MEGADINE EZ/CLEAN BUTN W/HOLSTR 10FT

## (undated) DEVICE — SHEET,DRAPE,70X100,STERILE: Brand: MEDLINE

## (undated) DEVICE — SPNG LAP 18X18IN LF STRL PK/5

## (undated) DEVICE — VLV SXN AIR/H2O ORCAPOD3 1P/U STRL

## (undated) DEVICE — HANDPIECE SET WITH HIGH FLOW TIP AND SUCTION TUBE: Brand: INTERPULSE

## (undated) DEVICE — 2108 SERIES SAGITTAL BLADE, NO OFFSET  (24.8 X 1.24 X 80.1MM)

## (undated) DEVICE — ELECTRD BLD EXT EDGE/INSUL 1P 4IN

## (undated) DEVICE — STRAP POSTN KN/BDY FM 5X72IN DISP

## (undated) DEVICE — C-ARM DRAPE: Brand: DEROYAL

## (undated) DEVICE — LUBE JELLY PK/2.75GM STRL BX/144

## (undated) DEVICE — 3M™ STERI-STRIP™ REINFORCED ADHESIVE SKIN CLOSURES, R1547, 1/2 IN X 4 IN (12 MM X 100 MM), 6 STRIPS/ENVELOPE: Brand: 3M™ STERI-STRIP™

## (undated) DEVICE — GLV SURG SENSICARE SLT PF LF 8 STRL

## (undated) DEVICE — HDRST INTUB GENTLETOUCH SLOT 7IN RT

## (undated) DEVICE — JACKSON-PRATT 100CC BULB RESERVOIR: Brand: CARDINAL HEALTH